# Patient Record
Sex: FEMALE | Race: BLACK OR AFRICAN AMERICAN | NOT HISPANIC OR LATINO | Employment: UNEMPLOYED | URBAN - METROPOLITAN AREA
[De-identification: names, ages, dates, MRNs, and addresses within clinical notes are randomized per-mention and may not be internally consistent; named-entity substitution may affect disease eponyms.]

---

## 2021-07-28 DIAGNOSIS — N95.1 MENOPAUSAL SYNDROME (HOT FLASHES): Primary | ICD-10-CM

## 2021-07-29 ENCOUNTER — TELEPHONE (OUTPATIENT)
Dept: OBSTETRICS AND GYNECOLOGY | Facility: CLINIC | Age: 46
End: 2021-07-29

## 2022-02-11 ENCOUNTER — OFFICE VISIT (OUTPATIENT)
Dept: PRIMARY CARE CLINIC | Facility: CLINIC | Age: 47
End: 2022-02-11
Payer: MEDICARE

## 2022-02-11 VITALS
DIASTOLIC BLOOD PRESSURE: 114 MMHG | SYSTOLIC BLOOD PRESSURE: 179 MMHG | HEIGHT: 67 IN | HEART RATE: 76 BPM | WEIGHT: 293 LBS | BODY MASS INDEX: 45.99 KG/M2 | RESPIRATION RATE: 90 BRPM

## 2022-02-11 DIAGNOSIS — Z12.4 SCREENING FOR CERVICAL CANCER: Primary | ICD-10-CM

## 2022-02-11 DIAGNOSIS — R79.89 LOW VITAMIN B12 LEVEL: ICD-10-CM

## 2022-02-11 DIAGNOSIS — Z12.31 ENCOUNTER FOR SCREENING MAMMOGRAM FOR MALIGNANT NEOPLASM OF BREAST: ICD-10-CM

## 2022-02-11 DIAGNOSIS — Z98.84 H/O BARIATRIC SURGERY: ICD-10-CM

## 2022-02-11 DIAGNOSIS — M13.0 POLYARTHRITIS: ICD-10-CM

## 2022-02-11 DIAGNOSIS — Z12.39 ENCOUNTER FOR SCREENING FOR MALIGNANT NEOPLASM OF BREAST, UNSPECIFIED SCREENING MODALITY: ICD-10-CM

## 2022-02-11 DIAGNOSIS — I10 HYPERTENSION, UNSPECIFIED TYPE: ICD-10-CM

## 2022-02-11 DIAGNOSIS — E66.01 OBESITY, MORBID: ICD-10-CM

## 2022-02-11 PROCEDURE — 3080F PR MOST RECENT DIASTOLIC BLOOD PRESSURE >= 90 MM HG: ICD-10-PCS | Mod: CPTII,S$GLB,, | Performed by: INTERNAL MEDICINE

## 2022-02-11 PROCEDURE — 99204 OFFICE O/P NEW MOD 45 MIN: CPT | Mod: S$GLB,,, | Performed by: INTERNAL MEDICINE

## 2022-02-11 PROCEDURE — 4010F ACE/ARB THERAPY RXD/TAKEN: CPT | Mod: CPTII,S$GLB,, | Performed by: INTERNAL MEDICINE

## 2022-02-11 PROCEDURE — 1159F MED LIST DOCD IN RCRD: CPT | Mod: CPTII,S$GLB,, | Performed by: INTERNAL MEDICINE

## 2022-02-11 PROCEDURE — 1159F PR MEDICATION LIST DOCUMENTED IN MEDICAL RECORD: ICD-10-PCS | Mod: CPTII,S$GLB,, | Performed by: INTERNAL MEDICINE

## 2022-02-11 PROCEDURE — 3077F SYST BP >= 140 MM HG: CPT | Mod: CPTII,S$GLB,, | Performed by: INTERNAL MEDICINE

## 2022-02-11 PROCEDURE — 3008F BODY MASS INDEX DOCD: CPT | Mod: CPTII,S$GLB,, | Performed by: INTERNAL MEDICINE

## 2022-02-11 PROCEDURE — 3080F DIAST BP >= 90 MM HG: CPT | Mod: CPTII,S$GLB,, | Performed by: INTERNAL MEDICINE

## 2022-02-11 PROCEDURE — 3077F PR MOST RECENT SYSTOLIC BLOOD PRESSURE >= 140 MM HG: ICD-10-PCS | Mod: CPTII,S$GLB,, | Performed by: INTERNAL MEDICINE

## 2022-02-11 PROCEDURE — 3008F PR BODY MASS INDEX (BMI) DOCUMENTED: ICD-10-PCS | Mod: CPTII,S$GLB,, | Performed by: INTERNAL MEDICINE

## 2022-02-11 PROCEDURE — 99204 PR OFFICE/OUTPT VISIT, NEW, LEVL IV, 45-59 MIN: ICD-10-PCS | Mod: S$GLB,,, | Performed by: INTERNAL MEDICINE

## 2022-02-11 PROCEDURE — 4010F PR ACE/ARB THEARPY RXD/TAKEN: ICD-10-PCS | Mod: CPTII,S$GLB,, | Performed by: INTERNAL MEDICINE

## 2022-02-11 RX ORDER — BUPROPION HYDROCHLORIDE 150 MG/1
TABLET, FILM COATED, EXTENDED RELEASE ORAL
COMMUNITY
Start: 2021-12-06 | End: 2022-07-21 | Stop reason: SDUPTHER

## 2022-02-11 RX ORDER — BUDESONIDE AND FORMOTEROL FUMARATE DIHYDRATE 160; 4.5 UG/1; UG/1
AEROSOL RESPIRATORY (INHALATION)
COMMUNITY
Start: 2021-03-31 | End: 2022-03-08

## 2022-02-11 RX ORDER — ONDANSETRON 4 MG/1
TABLET, ORALLY DISINTEGRATING ORAL
COMMUNITY
Start: 2022-01-06 | End: 2022-03-08

## 2022-02-11 RX ORDER — METOPROLOL SUCCINATE 50 MG/1
50 TABLET, EXTENDED RELEASE ORAL
COMMUNITY
Start: 2021-02-25 | End: 2023-07-13 | Stop reason: SDUPTHER

## 2022-02-11 RX ORDER — GABAPENTIN 300 MG/1
300 CAPSULE ORAL 2 TIMES DAILY
COMMUNITY
Start: 2022-01-10 | End: 2022-07-21

## 2022-02-11 RX ORDER — SACUBITRIL AND VALSARTAN 24; 26 MG/1; MG/1
1 TABLET, FILM COATED ORAL 2 TIMES DAILY
COMMUNITY
Start: 2022-01-10 | End: 2022-06-07

## 2022-02-11 RX ORDER — CYANOCOBALAMIN 1000 UG/ML
1000 INJECTION, SOLUTION INTRAMUSCULAR; SUBCUTANEOUS
COMMUNITY
Start: 2021-06-30 | End: 2023-07-13

## 2022-02-11 RX ORDER — BUDESONIDE AND FORMOTEROL FUMARATE DIHYDRATE 80; 4.5 UG/1; UG/1
2 AEROSOL RESPIRATORY (INHALATION)
COMMUNITY

## 2022-02-11 RX ORDER — MUPIROCIN 20 MG/G
OINTMENT TOPICAL 3 TIMES DAILY
COMMUNITY
Start: 2022-01-10

## 2022-02-11 RX ORDER — CALCIUM CARBONATE 500(1250)
1 TABLET,CHEWABLE ORAL DAILY
COMMUNITY
End: 2023-11-15

## 2022-02-11 RX ORDER — MECLIZINE HYDROCHLORIDE 25 MG/1
TABLET ORAL
COMMUNITY
Start: 2022-01-10 | End: 2022-03-08

## 2022-02-11 RX ORDER — SYRINGE AND NEEDLE,INSULIN,1ML 29 G X1/2"
SYRINGE, EMPTY DISPOSABLE MISCELLANEOUS
COMMUNITY
Start: 2021-10-16

## 2022-02-11 RX ORDER — PANTOPRAZOLE SODIUM 40 MG/1
1 TABLET, DELAYED RELEASE ORAL DAILY
COMMUNITY
Start: 2021-12-22 | End: 2022-03-08 | Stop reason: SDUPTHER

## 2022-02-11 RX ORDER — PREDNISONE 5 MG/1
TABLET ORAL
COMMUNITY
Start: 2021-12-15 | End: 2022-06-07

## 2022-02-11 RX ORDER — TOFACITINIB 11 MG/1
TABLET, FILM COATED, EXTENDED RELEASE ORAL
COMMUNITY
Start: 2021-11-29 | End: 2022-03-03 | Stop reason: SDUPTHER

## 2022-02-11 NOTE — PROGRESS NOTES
Subjective:      Patient ID: Lilly Loera is a 46 y.o. female.    Chief Complaint: Establish Care (Pt had 2 falls that broke her arm), Referral (Pt need referral for a dietician and also a referral to a mental health facility. ), and Dizziness (Pt stated the dizziness until pt had covid)    HPI       Was seeing Dr Washington and states she has had a bunch of lab work but no appointment. She has been on methotrexate in the past but it made her sick    Patient states she had a pneumonia and influenza vaccine in 2018 and she had a severe reaction. She states she was waiting on Dr Washington to let her know if she is safe to get the covid vaccine. She states she has had it twice now      Her podatrist is Dr Cryer. 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 A brace has been ordered. She also has polyneuropathy which was discovered by a neurologist      Dr Bae is her Cardiologist    Neurologist is Dr Bai          Past Medical History:   Diagnosis Date    Anemia     Anxiety     Arthritis     CHF (congestive heart failure)     Depression     Hidradenitis suppurativa     Hypertension     Polyneuropathy        Social History     Socioeconomic History    Marital status: Single   Tobacco Use    Smoking status: Current Every Day Smoker    Smokeless tobacco: Never Used   Substance and Sexual Activity    Alcohol use: Yes    Drug use: Not Currently    Sexual activity: Not Currently     Family History   Problem Relation Age of Onset    Hypertension Mother     Arthritis Mother     Hypertension Father     Arthritis Father     Alcohol abuse Father     Heart disease Father        Past Surgical History:   Procedure Laterality Date    club foot correction      gall blader removed      GASTRIC BYPASS      gastric sleeve      HERNIA REPAIR      HYSTERECTOMY      partial    meniscus repair surgery      repaired ac1      under arm graphs         Review of Systems   Constitutional: Positive for  "weight loss. Negative for chills and fever.        Post bariatric surgery   Respiratory: Negative for cough, shortness of breath and wheezing.    Cardiovascular: Negative for chest pain, palpitations and leg swelling.   Gastrointestinal: Negative for abdominal pain, constipation, diarrhea, nausea and vomiting.   Genitourinary: Negative for dysuria, frequency and urgency.   Musculoskeletal: Positive for back pain, falls and myalgias.   Neurological: Positive for dizziness.     Objective:     Physical Exam  Vitals reviewed.   Constitutional:       Appearance: Normal appearance. She is obese.   HENT:      Head: Normocephalic.   Eyes:      Extraocular Movements: Extraocular movements intact.      Conjunctiva/sclera: Conjunctivae normal.      Pupils: Pupils are equal, round, and reactive to light.   Cardiovascular:      Rate and Rhythm: Normal rate and regular rhythm.   Pulmonary:      Effort: Pulmonary effort is normal.      Breath sounds: Normal breath sounds.   Abdominal:      General: Bowel sounds are normal.   Musculoskeletal:      Right lower leg: No edema.      Left lower leg: No edema.   Skin:     General: Skin is warm.      Capillary Refill: Capillary refill takes less than 2 seconds.   Neurological:      General: No focal deficit present.      Mental Status: She is alert and oriented to person, place, and time.   Psychiatric:         Mood and Affect: Mood normal.        BP (!) 179/114 (BP Location: Right arm, Patient Position: Sitting, BP Method: Large (Automatic))   Pulse 76   Resp (!) 90   Ht 5' 7" (1.702 m)   Wt (!) 151.1 kg (333 lb 3.2 oz)   BMI 52.19 kg/m²     Assessment:       ICD-10-CM ICD-9-CM   1. Polyarthritis  M13.0 716.50   2. Obesity, morbid  E66.01 278.01   3. Encounter for screening for malignant neoplasm of breast, unspecified screening modality  Z12.39 V76.10   4. Encounter for screening mammogram for malignant neoplasm of breast   Z12.31 V76.12   5. Hypertension, unspecified type  I10 " 401.9   6. H/O bariatric surgery  Z98.84 V45.86   7. Low vitamin B12 level  E53.8 266.2       Plan:          Polyarthritis  -     Ambulatory referral/consult to Rheumatology; Future; Expected date: 02/18/2022  -     CBC Auto Differential; Future; Expected date: 02/11/2022  -     Comprehensive Metabolic Panel; Future; Expected date: 02/11/2022  -     TSH; Future; Expected date: 02/11/2022  -     Lipid Panel; Future; Expected date: 02/11/2022  -     Vitamin B12; Future; Expected date: 02/11/2022    Obesity, morbid    Encounter for screening for malignant neoplasm of breast, unspecified screening modality  -     Mammo Digital Screening Bilat; Future; Expected date: 02/11/2022    Encounter for screening mammogram for malignant neoplasm of breast   -     Mammo Digital Screening Bilat; Future; Expected date: 02/11/2022    Hypertension, unspecified type  -     CBC Auto Differential; Future; Expected date: 02/11/2022  -     Comprehensive Metabolic Panel; Future; Expected date: 02/11/2022  -     TSH; Future; Expected date: 02/11/2022  -     Lipid Panel; Future; Expected date: 02/11/2022  -     Vitamin B12; Future; Expected date: 02/11/2022    H/O bariatric surgery  -     CBC Auto Differential; Future; Expected date: 02/11/2022  -     Comprehensive Metabolic Panel; Future; Expected date: 02/11/2022  -     TSH; Future; Expected date: 02/11/2022  -     Lipid Panel; Future; Expected date: 02/11/2022  -     Vitamin B12; Future; Expected date: 02/11/2022    Low vitamin B12 level  -     Vitamin B12; Future; Expected date: 02/11/2022         40-minute visit. 5 minutes spent counseling patient on diet, exercise, and weight loss.

## 2022-02-12 LAB
ALBUMIN SERPL-MCNC: 4 G/DL (ref 3.6–5.1)
ALBUMIN/GLOB SERPL: 1.2 (CALC) (ref 1–2.5)
ALP SERPL-CCNC: 73 U/L (ref 31–125)
ALT SERPL-CCNC: 12 U/L (ref 6–29)
AST SERPL-CCNC: 19 U/L (ref 10–30)
BASOPHILS # BLD AUTO: 21 CELLS/UL (ref 0–200)
BASOPHILS NFR BLD AUTO: 0.3 %
BILIRUB SERPL-MCNC: 1 MG/DL (ref 0.2–1.2)
BUN SERPL-MCNC: 19 MG/DL (ref 7–25)
BUN/CREAT SERPL: NORMAL (CALC) (ref 6–22)
CALCIUM SERPL-MCNC: 9.5 MG/DL (ref 8.6–10.2)
CHLORIDE SERPL-SCNC: 105 MMOL/L (ref 98–110)
CHOLEST SERPL-MCNC: 155 MG/DL
CHOLEST/HDLC SERPL: 3 (CALC)
CO2 SERPL-SCNC: 29 MMOL/L (ref 20–32)
CREAT SERPL-MCNC: 0.91 MG/DL (ref 0.5–1.1)
EOSINOPHIL # BLD AUTO: 117 CELLS/UL (ref 15–500)
EOSINOPHIL NFR BLD AUTO: 1.7 %
ERYTHROCYTE [DISTWIDTH] IN BLOOD BY AUTOMATED COUNT: 12.1 % (ref 11–15)
GLOBULIN SER CALC-MCNC: 3.3 G/DL (CALC) (ref 1.9–3.7)
GLUCOSE SERPL-MCNC: 95 MG/DL (ref 65–99)
HCT VFR BLD AUTO: 39.9 % (ref 35–45)
HDLC SERPL-MCNC: 52 MG/DL
HGB BLD-MCNC: 12.9 G/DL (ref 11.7–15.5)
LDLC SERPL CALC-MCNC: 88 MG/DL (CALC)
LYMPHOCYTES # BLD AUTO: 1925 CELLS/UL (ref 850–3900)
LYMPHOCYTES NFR BLD AUTO: 27.9 %
MCH RBC QN AUTO: 31.1 PG (ref 27–33)
MCHC RBC AUTO-ENTMCNC: 32.3 G/DL (ref 32–36)
MCV RBC AUTO: 96.1 FL (ref 80–100)
MONOCYTES # BLD AUTO: 449 CELLS/UL (ref 200–950)
MONOCYTES NFR BLD AUTO: 6.5 %
NEUTROPHILS # BLD AUTO: 4388 CELLS/UL (ref 1500–7800)
NEUTROPHILS NFR BLD AUTO: 63.6 %
NONHDLC SERPL-MCNC: 103 MG/DL (CALC)
PLATELET # BLD AUTO: 298 THOUSAND/UL (ref 140–400)
PMV BLD REES-ECKER: 10.5 FL (ref 7.5–12.5)
POTASSIUM SERPL-SCNC: 4.1 MMOL/L (ref 3.5–5.3)
PROT SERPL-MCNC: 7.3 G/DL (ref 6.1–8.1)
RBC # BLD AUTO: 4.15 MILLION/UL (ref 3.8–5.1)
SODIUM SERPL-SCNC: 140 MMOL/L (ref 135–146)
TRIGL SERPL-MCNC: 68 MG/DL
TSH SERPL-ACNC: 1.27 MIU/L
VIT B12 SERPL-MCNC: >2000 PG/ML (ref 200–1100)
WBC # BLD AUTO: 6.9 THOUSAND/UL (ref 3.8–10.8)

## 2022-02-14 ENCOUNTER — TELEPHONE (OUTPATIENT)
Dept: PRIMARY CARE CLINIC | Facility: CLINIC | Age: 47
End: 2022-02-14
Payer: MEDICARE

## 2022-02-14 ENCOUNTER — PATIENT MESSAGE (OUTPATIENT)
Dept: PRIMARY CARE CLINIC | Facility: CLINIC | Age: 47
End: 2022-02-14
Payer: MEDICARE

## 2022-02-14 DIAGNOSIS — Z98.84 H/O BARIATRIC SURGERY: ICD-10-CM

## 2022-02-14 DIAGNOSIS — E66.01 OBESITY, MORBID: Primary | ICD-10-CM

## 2022-02-14 DIAGNOSIS — F32.A DEPRESSION, UNSPECIFIED DEPRESSION TYPE: ICD-10-CM

## 2022-02-14 NOTE — TELEPHONE ENCOUNTER
Called the patient to let her know I received the message and would forward to Dr Puckett.     ----- Message from Sharon Marie sent at 2/14/2022  8:22 AM CST -----  Contact: Patient  Patient called to consult with nurse or staff regarding her referrals for dermatology, psychiatry and nutritionist. She would like to see if those referrals can be put in for her and would like a call back. Patient can be reached at [phone]. Thanks/MR

## 2022-02-17 ENCOUNTER — TELEPHONE (OUTPATIENT)
Dept: PRIMARY CARE CLINIC | Facility: CLINIC | Age: 47
End: 2022-02-17
Payer: MEDICARE

## 2022-02-24 DIAGNOSIS — L73.2 HIDRADENITIS SUPPURATIVA: Primary | ICD-10-CM

## 2022-02-25 ENCOUNTER — PATIENT MESSAGE (OUTPATIENT)
Dept: PRIMARY CARE CLINIC | Facility: CLINIC | Age: 47
End: 2022-02-25
Payer: MEDICARE

## 2022-02-25 RX ORDER — TOFACITINIB 11 MG/1
TABLET, FILM COATED, EXTENDED RELEASE ORAL
Status: CANCELLED | OUTPATIENT
Start: 2022-02-25

## 2022-02-25 NOTE — TELEPHONE ENCOUNTER
Message was sent thru portal              ----- Message from Dillon De La Paz sent at 2/25/2022 11:24 AM CST -----  Contact: pt  Type:  RX Refill Request    Who Called:  pt   Refill or New Rx:refill   RX Name and Strength:xelgenz xr 11mg   How is the patient currently taking it? (ex. 1XDay): once daily   Is this a 30 day or 90 day RX: 30 days   Preferred Pharmacy with phone number: [facility]  [phone]  Local or Mail Order: local   Ordering Provider: maximo   Would the patient rather a call back or a response via [facility]? Pt portal or phone   Best Call Back Number: [phone]  Additional Information:

## 2022-02-27 ENCOUNTER — PATIENT MESSAGE (OUTPATIENT)
Dept: PRIMARY CARE CLINIC | Facility: CLINIC | Age: 47
End: 2022-02-27
Payer: MEDICARE

## 2022-02-28 RX ORDER — TOFACITINIB 11 MG/1
TABLET, FILM COATED, EXTENDED RELEASE ORAL
Status: CANCELLED | OUTPATIENT
Start: 2022-02-28

## 2022-03-02 DIAGNOSIS — M13.0 POLYARTHRITIS: Primary | ICD-10-CM

## 2022-03-02 RX ORDER — TOFACITINIB 11 MG/1
TABLET, FILM COATED, EXTENDED RELEASE ORAL
Status: CANCELLED | OUTPATIENT
Start: 2022-03-02

## 2022-03-03 ENCOUNTER — PATIENT MESSAGE (OUTPATIENT)
Dept: PRIMARY CARE CLINIC | Facility: CLINIC | Age: 47
End: 2022-03-03
Payer: MEDICARE

## 2022-03-03 RX ORDER — TOFACITINIB 11 MG/1
1 TABLET, FILM COATED, EXTENDED RELEASE ORAL DAILY
Qty: 30 TABLET | Refills: 3 | Status: SHIPPED | OUTPATIENT
Start: 2022-03-03 | End: 2022-06-28 | Stop reason: SDUPTHER

## 2022-03-03 RX ORDER — TOFACITINIB 11 MG/1
TABLET, FILM COATED, EXTENDED RELEASE ORAL
OUTPATIENT
Start: 2022-03-03

## 2022-03-03 NOTE — TELEPHONE ENCOUNTER
mai urban [birth year] is requesting xeljanz, this is something her rheumatologist prescribes, she sees Dr Washington

## 2022-03-08 ENCOUNTER — OFFICE VISIT (OUTPATIENT)
Dept: OBSTETRICS AND GYNECOLOGY | Facility: CLINIC | Age: 47
End: 2022-03-08
Payer: MEDICARE

## 2022-03-08 VITALS
BODY MASS INDEX: 45.99 KG/M2 | SYSTOLIC BLOOD PRESSURE: 167 MMHG | WEIGHT: 293 LBS | OXYGEN SATURATION: 98 % | HEART RATE: 81 BPM | DIASTOLIC BLOOD PRESSURE: 103 MMHG | HEIGHT: 67 IN

## 2022-03-08 DIAGNOSIS — G51.0 BELL'S PALSY: ICD-10-CM

## 2022-03-08 DIAGNOSIS — F41.9 ANXIETY: ICD-10-CM

## 2022-03-08 DIAGNOSIS — N95.1 MENOPAUSAL SYNDROME (HOT FLASHES): ICD-10-CM

## 2022-03-08 DIAGNOSIS — F17.200 SMOKER: ICD-10-CM

## 2022-03-08 DIAGNOSIS — Z12.4 SCREENING FOR CERVICAL CANCER: ICD-10-CM

## 2022-03-08 DIAGNOSIS — Z01.419 ENCOUNTER FOR ANNUAL ROUTINE GYNECOLOGICAL EXAMINATION: Primary | ICD-10-CM

## 2022-03-08 PROCEDURE — 3077F PR MOST RECENT SYSTOLIC BLOOD PRESSURE >= 140 MM HG: ICD-10-PCS | Mod: CPTII,S$GLB,, | Performed by: NURSE PRACTITIONER

## 2022-03-08 PROCEDURE — 1160F RVW MEDS BY RX/DR IN RCRD: CPT | Mod: CPTII,S$GLB,, | Performed by: NURSE PRACTITIONER

## 2022-03-08 PROCEDURE — 1160F PR REVIEW ALL MEDS BY PRESCRIBER/CLIN PHARMACIST DOCUMENTED: ICD-10-PCS | Mod: CPTII,S$GLB,, | Performed by: NURSE PRACTITIONER

## 2022-03-08 PROCEDURE — 3077F SYST BP >= 140 MM HG: CPT | Mod: CPTII,S$GLB,, | Performed by: NURSE PRACTITIONER

## 2022-03-08 PROCEDURE — G0101 CA SCREEN;PELVIC/BREAST EXAM: HCPCS | Mod: S$GLB,,, | Performed by: NURSE PRACTITIONER

## 2022-03-08 PROCEDURE — 3008F PR BODY MASS INDEX (BMI) DOCUMENTED: ICD-10-PCS | Mod: CPTII,S$GLB,, | Performed by: NURSE PRACTITIONER

## 2022-03-08 PROCEDURE — G0101 PR CA SCREEN;PELVIC/BREAST EXAM: ICD-10-PCS | Mod: S$GLB,,, | Performed by: NURSE PRACTITIONER

## 2022-03-08 PROCEDURE — 1159F MED LIST DOCD IN RCRD: CPT | Mod: CPTII,S$GLB,, | Performed by: NURSE PRACTITIONER

## 2022-03-08 PROCEDURE — 3080F PR MOST RECENT DIASTOLIC BLOOD PRESSURE >= 90 MM HG: ICD-10-PCS | Mod: CPTII,S$GLB,, | Performed by: NURSE PRACTITIONER

## 2022-03-08 PROCEDURE — 3008F BODY MASS INDEX DOCD: CPT | Mod: CPTII,S$GLB,, | Performed by: NURSE PRACTITIONER

## 2022-03-08 PROCEDURE — 4010F PR ACE/ARB THEARPY RXD/TAKEN: ICD-10-PCS | Mod: CPTII,S$GLB,, | Performed by: NURSE PRACTITIONER

## 2022-03-08 PROCEDURE — 1159F PR MEDICATION LIST DOCUMENTED IN MEDICAL RECORD: ICD-10-PCS | Mod: CPTII,S$GLB,, | Performed by: NURSE PRACTITIONER

## 2022-03-08 PROCEDURE — 3080F DIAST BP >= 90 MM HG: CPT | Mod: CPTII,S$GLB,, | Performed by: NURSE PRACTITIONER

## 2022-03-08 PROCEDURE — 4010F ACE/ARB THERAPY RXD/TAKEN: CPT | Mod: CPTII,S$GLB,, | Performed by: NURSE PRACTITIONER

## 2022-03-08 RX ORDER — ESTRADIOL 0.05 MG/D
1 FILM, EXTENDED RELEASE TRANSDERMAL
Qty: 12 PATCH | Refills: 3 | Status: SHIPPED | OUTPATIENT
Start: 2022-03-10 | End: 2022-03-08

## 2022-03-08 RX ORDER — PANTOPRAZOLE SODIUM 40 MG/1
40 TABLET, DELAYED RELEASE ORAL DAILY
Qty: 90 TABLET | Refills: 3 | Status: SHIPPED | OUTPATIENT
Start: 2022-03-08 | End: 2023-07-13

## 2022-03-08 RX ORDER — BUSPIRONE HYDROCHLORIDE 15 MG/1
TABLET ORAL
Qty: 30 TABLET | Refills: 11 | Status: SHIPPED | OUTPATIENT
Start: 2022-03-08 | End: 2022-07-21 | Stop reason: SDUPTHER

## 2022-03-08 NOTE — PROGRESS NOTES
"  Subjective:       Patient ID: Lilly Loera is a 46 y.o. female.    Chief Complaint:  Well Woman (Patient is here for her Annual with Pap)      History of Present Illness  Pt here for gyn annual.  History and past labs reviewed with patient.    Complaints: Hot flashes, WWE, anxiety   With the start of the Zyban anxiety has increased and pt is feeling "aura of doom"    Review of Systems  Review of Systems   Constitutional: Negative for appetite change and fatigue.   HENT: Negative for tinnitus.    Cardiovascular: Negative for chest pain and palpitations.   Gastrointestinal: Negative for abdominal pain, bloating, constipation, vomiting and reflux.   Endocrine: Negative for diabetes, hyperthyroidism and hypothyroidism.   Genitourinary: Positive for vaginal dryness. Negative for flank pain, pelvic pain and postcoital bleeding.   Musculoskeletal: Negative for back pain and myalgias.   Integumentary:  Negative for rash, mole/lesion, breast mass and breast tenderness.   Neurological: Negative for vertigo, syncope and headaches.   Psychiatric/Behavioral: Negative for depression and sleep disturbance. The patient is not nervous/anxious.    Breast: Negative for lump, mass, mastodynia and tenderness     c/o menopause      Objective:     Vitals:    03/08/22 1400   BP: (!) 167/103   BP Location: Left arm   Patient Position: Sitting   Pulse: 81   SpO2: 98%   Weight: (!) 150.5 kg (331 lb 11.2 oz)   Height: 5' 7" (1.702 m)        Physical Exam:   Constitutional: She is oriented to person, place, and time. She appears well-developed and well-nourished. No distress.    HENT:   Head: Normocephalic and atraumatic.    Eyes: Pupils are equal, round, and reactive to light. Conjunctivae and EOM are normal.    Neck: No tracheal deviation present. No thyromegaly present.    Cardiovascular: Normal rate, regular rhythm and normal heart sounds.  Exam reveals no clubbing, no cyanosis and no edema.     Pulmonary/Chest: Effort normal and " breath sounds normal. No respiratory distress.        Abdominal: Soft. She exhibits no distension and no mass. There is no abdominal tenderness. There is no rebound and no guarding. No hernia. Hernia confirmed negative in the right inguinal area and confirmed negative in the left inguinal area.     Genitourinary:    Vagina and rectum normal.      Pelvic exam was performed with patient supine.   There is no rash, tenderness, lesion or injury on the right labia. There is no rash, tenderness, lesion or injury on the left labia. Cervix is normal. Right adnexum displays no mass, no tenderness and no fullness. Left adnexum displays no mass, no tenderness and no fullness. Vaginal cuff normal.  No  no vaginal discharge in the vagina. Cervix exhibits no discharge. Uterus is absent.           Musculoskeletal: Normal range of motion and moves all extremeties.       Neurological: She is alert and oriented to person, place, and time.    Skin: Skin is warm and dry. No cyanosis. Nails show no clubbing.    Psychiatric: She has a normal mood and affect. Her behavior is normal. Judgment and thought content normal.      breast exam wnl- no nipple dc, skin changes, masses or lymph nodes palpated bilaterally    Assessment:     1. Encounter for annual routine gynecological examination    2. Screening for cervical cancer    3. Menopausal syndrome (hot flashes)    4. Bell's palsy    5. Smoker    6. Anxiety              Plan:       Encounter for annual routine gynecological examination    Screening for cervical cancer  -     Ambulatory referral/consult to Gynecology    Menopausal syndrome (hot flashes)  -     estradiol 0.05 mg/24 hr td ptsw (VIVELLE-DOT) 0.05 mg/24 hr; Place 1 patch onto the skin twice a week.  Dispense: 12 patch; Refill: 3    Bell's palsy    Smoker  -     Ambulatory referral/consult to Smoking Cessation Program; Future; Expected date: 03/15/2022    Anxiety  -     busPIRone (BUSPAR) 15 MG tablet; Pt is to take 1/3 (5 mg) or  1/2 (7.5mgs) or one po HS PRN  Dispense: 30 tablet; Refill: 11    Other orders  -     pantoprazole (PROTONIX) 40 MG tablet; Take 1 tablet (40 mg total) by mouth once daily.  Dispense: 90 tablet; Refill: 3     instructed to continue with the smoking cessation   MMG ordered  Follow up in about 1 year (around 3/8/2023).

## 2022-03-18 ENCOUNTER — PATIENT MESSAGE (OUTPATIENT)
Dept: PRIMARY CARE CLINIC | Facility: CLINIC | Age: 47
End: 2022-03-18
Payer: MEDICARE

## 2022-03-24 ENCOUNTER — OFFICE VISIT (OUTPATIENT)
Dept: FAMILY MEDICINE | Facility: CLINIC | Age: 47
End: 2022-03-24
Payer: MEDICARE

## 2022-03-24 VITALS
SYSTOLIC BLOOD PRESSURE: 155 MMHG | TEMPERATURE: 97 F | HEIGHT: 67 IN | RESPIRATION RATE: 16 BRPM | DIASTOLIC BLOOD PRESSURE: 105 MMHG | BODY MASS INDEX: 45.99 KG/M2 | HEART RATE: 77 BPM | OXYGEN SATURATION: 99 % | WEIGHT: 293 LBS

## 2022-03-24 DIAGNOSIS — R03.0 ELEVATED BLOOD PRESSURE READING: ICD-10-CM

## 2022-03-24 DIAGNOSIS — F41.9 ANXIETY AND DEPRESSION: ICD-10-CM

## 2022-03-24 DIAGNOSIS — F32.A ANXIETY AND DEPRESSION: ICD-10-CM

## 2022-03-24 DIAGNOSIS — I50.9 CONGESTIVE HEART FAILURE, UNSPECIFIED HF CHRONICITY, UNSPECIFIED HEART FAILURE TYPE: ICD-10-CM

## 2022-03-24 DIAGNOSIS — L73.2 HIDRADENITIS SUPPURATIVA OF LEFT AXILLA: ICD-10-CM

## 2022-03-24 DIAGNOSIS — Z76.89 ENCOUNTER TO ESTABLISH CARE: Primary | ICD-10-CM

## 2022-03-24 DIAGNOSIS — Z00.00 ROUTINE ADULT HEALTH MAINTENANCE: ICD-10-CM

## 2022-03-24 PROBLEM — Z98.84 S/P GASTRIC BYPASS: Status: ACTIVE | Noted: 2021-02-26

## 2022-03-24 PROBLEM — K44.9 HIATAL HERNIA WITH GERD AND ESOPHAGITIS: Status: ACTIVE | Noted: 2022-03-24

## 2022-03-24 PROBLEM — K21.00 HIATAL HERNIA WITH GERD AND ESOPHAGITIS: Status: ACTIVE | Noted: 2022-03-24

## 2022-03-24 PROBLEM — I10 ESSENTIAL HYPERTENSION: Status: ACTIVE | Noted: 2022-03-24

## 2022-03-24 PROBLEM — M06.9 RA (RHEUMATOID ARTHRITIS): Status: ACTIVE | Noted: 2022-03-24

## 2022-03-24 PROBLEM — M19.90 OSTEOARTHRITIS: Status: ACTIVE | Noted: 2022-03-24

## 2022-03-24 PROCEDURE — 3077F PR MOST RECENT SYSTOLIC BLOOD PRESSURE >= 140 MM HG: ICD-10-PCS | Mod: CPTII,S$GLB,, | Performed by: OBSTETRICS & GYNECOLOGY

## 2022-03-24 PROCEDURE — 4010F ACE/ARB THERAPY RXD/TAKEN: CPT | Mod: CPTII,S$GLB,, | Performed by: OBSTETRICS & GYNECOLOGY

## 2022-03-24 PROCEDURE — 99214 PR OFFICE/OUTPT VISIT, EST, LEVL IV, 30-39 MIN: ICD-10-PCS | Mod: S$GLB,,, | Performed by: OBSTETRICS & GYNECOLOGY

## 2022-03-24 PROCEDURE — 99214 OFFICE O/P EST MOD 30 MIN: CPT | Mod: S$GLB,,, | Performed by: OBSTETRICS & GYNECOLOGY

## 2022-03-24 PROCEDURE — 1160F PR REVIEW ALL MEDS BY PRESCRIBER/CLIN PHARMACIST DOCUMENTED: ICD-10-PCS | Mod: CPTII,S$GLB,, | Performed by: OBSTETRICS & GYNECOLOGY

## 2022-03-24 PROCEDURE — 4010F PR ACE/ARB THEARPY RXD/TAKEN: ICD-10-PCS | Mod: CPTII,S$GLB,, | Performed by: OBSTETRICS & GYNECOLOGY

## 2022-03-24 PROCEDURE — 3080F DIAST BP >= 90 MM HG: CPT | Mod: CPTII,S$GLB,, | Performed by: OBSTETRICS & GYNECOLOGY

## 2022-03-24 PROCEDURE — 1159F MED LIST DOCD IN RCRD: CPT | Mod: CPTII,S$GLB,, | Performed by: OBSTETRICS & GYNECOLOGY

## 2022-03-24 PROCEDURE — 1160F RVW MEDS BY RX/DR IN RCRD: CPT | Mod: CPTII,S$GLB,, | Performed by: OBSTETRICS & GYNECOLOGY

## 2022-03-24 PROCEDURE — 3008F PR BODY MASS INDEX (BMI) DOCUMENTED: ICD-10-PCS | Mod: CPTII,S$GLB,, | Performed by: OBSTETRICS & GYNECOLOGY

## 2022-03-24 PROCEDURE — 1159F PR MEDICATION LIST DOCUMENTED IN MEDICAL RECORD: ICD-10-PCS | Mod: CPTII,S$GLB,, | Performed by: OBSTETRICS & GYNECOLOGY

## 2022-03-24 PROCEDURE — 3008F BODY MASS INDEX DOCD: CPT | Mod: CPTII,S$GLB,, | Performed by: OBSTETRICS & GYNECOLOGY

## 2022-03-24 PROCEDURE — 3080F PR MOST RECENT DIASTOLIC BLOOD PRESSURE >= 90 MM HG: ICD-10-PCS | Mod: CPTII,S$GLB,, | Performed by: OBSTETRICS & GYNECOLOGY

## 2022-03-24 PROCEDURE — 3077F SYST BP >= 140 MM HG: CPT | Mod: CPTII,S$GLB,, | Performed by: OBSTETRICS & GYNECOLOGY

## 2022-03-24 NOTE — PROGRESS NOTES
Subjective:   Patient ID: Lilly Loera is a 46 y.o. female who presents for evaluation today.    Chief Complaint:  Establish Care      History of Present Illness  KIKI Carr is a 46 y.o. y.o.female who presents to clinic today to establish care with me.  She was previously under the care of Dr. Puckett.     GYN - Sees Chelsea Rasmussen NP. Up to date on pap smear. In process of scheduling mammogram.     Seeing smoking cessation, in the process of quitting       Anxiety/Depression  Patient reports symptoms of anxiety and depression.   Onset of symptoms was approximately a few months ago.    Symptoms have been gradually worsening since that time. Improving some since starting Buspar & decreasing Zyban. Denies suicidal attempt, suicidal thoughts, suicidal thoughts with specific plan and suicidal thoughts without plan.   Previous treatment includes medication BuSpar.   She reports the following medication side effects: none.    CHF, hypertension  CHF diagnosed in 2020 after having Covid 19  Sees Dr. Bae for management of conditions  Last office visit <6 months ago  Requesting referral to RD     Polyarthralgia - was previously seeing Dr. Washington, awaiting appointment with Dr. Asencio scheduled for 6/28/2022    Hidradenitis suppurativa - has upcoming appointment with dermatology, currently battling flare in left axilla    OA  Sees Jason Carson MD for management     FAMILY HISTORY  Family History   Problem Relation Age of Onset    Hypertension Mother     Arthritis Mother     Hypertension Father     Arthritis Father     Alcohol abuse Father     Heart disease Father      SOCIAL HISTORY  Social History     Tobacco Use   Smoking Status Current Every Day Smoker   Smokeless Tobacco Never Used   ,   Social History     Substance and Sexual Activity   Alcohol Use Yes     MEDICATIONS  Outpatient Medications Marked as Taking for the 3/24/22 encounter (Office Visit) with Barb Jaramillo NP   Medication Sig Dispense  "Refill    budesonide-formoterol 80-4.5 mcg (SYMBICORT) 80-4.5 mcg/actuation HFAA Inhale 2 puffs into the lungs. Controller      buPROPion HCL, smoking deter, (ZYBAN) 150 mg TBSR 12 hr tablet TAKE 1 TABLET BY MOUTH IN THE MORNING FOR THE FIRST 3 DAYS THEN 1 TABLET BY MOUTH EVERY 12 HOURS      busPIRone (BUSPAR) 15 MG tablet Pt is to take 1/3 (5 mg) or 1/2 (7.5mgs) or one po HS PRN 30 tablet 11    calcium carbonate (OS-MICHELLE) 500 mg calcium (1,250 mg) chewable tablet Take 1 tablet by mouth once daily.      cyanocobalamin 1,000 mcg/mL injection Inject 1,000 mcg as directed.      PAWAN 0.05 mg/24 hr APPLY 1 PATCH TOPICALLY TWICE A WEEK 8 patch 3    ENTRESTO 24-26 mg per tablet Take 1 tablet by mouth 2 (two) times daily.      gabapentin (NEURONTIN) 300 MG capsule Take 300 mg by mouth 2 (two) times daily.      metoprolol succinate (TOPROL-XL) 50 MG 24 hr tablet Take 50 mg by mouth.      mupirocin (BACTROBAN) 2 % ointment 3 (three) times daily. Apply to affected area      pantoprazole (PROTONIX) 40 MG tablet Take 1 tablet (40 mg total) by mouth once daily. 90 tablet 3    SURE COMFORT INSULIN SYRINGE 1 mL 29 gauge x 1/2" Syrg       tofacitinib (XELJANZ XR) 11 mg Tb24 Take 1 tablet by mouth once daily. 30 tablet 3     Review of Systems   Review of Systems   Constitutional: Negative for activity change, appetite change, fever and unexpected weight change.   HENT: Negative for dental problem, hearing loss, sneezing, sore throat, trouble swallowing and voice change.    Eyes: Negative for visual disturbance.   Respiratory: Negative for choking, chest tightness, shortness of breath and stridor.    Cardiovascular: Negative for chest pain and palpitations.   Gastrointestinal: Negative for abdominal pain, anal bleeding, blood in stool, change in bowel habit, nausea, vomiting, fecal incontinence and change in bowel habit.   Endocrine: Negative for polydipsia, polyphagia and polyuria.   Genitourinary: Negative for decreased " "urine volume, difficulty urinating, dysuria, frequency, hematuria and urgency.   Musculoskeletal: Positive for arthralgias. Negative for gait problem, joint swelling, neck pain, neck stiffness and joint deformity.   Integumentary:  Negative for pallor, rash, wound and mole/lesion.   Allergic/Immunologic: Negative for immunocompromised state.   Neurological: Negative for vertigo, seizures, syncope, weakness, light-headedness, disturbances in coordination and coordination difficulties.   Hematological: Negative for adenopathy. Does not bruise/bleed easily.   Psychiatric/Behavioral: Negative for agitation, behavioral problems, confusion, hallucinations, sleep disturbance and suicidal ideas.         Objective:    VITALS  BP Readings from Last 1 Encounters:   03/24/22 (!) 155/105   Weight: (!) 150.1 kg (331 lb)   Height: 5' 7" (170.2 cm)   BMI Readings from Last 1 Encounters:   03/24/22 51.84 kg/m²       Physical Exam  Vitals reviewed.   Constitutional:       General: She is not in acute distress.     Appearance: Normal appearance. She is not ill-appearing, toxic-appearing or diaphoretic.   HENT:      Head: Normocephalic and atraumatic.      Right Ear: External ear normal.      Left Ear: External ear normal.      Nose: Nose normal. No congestion or rhinorrhea.   Eyes:      General:         Right eye: No discharge.         Left eye: No discharge.   Cardiovascular:      Rate and Rhythm: Normal rate and regular rhythm.      Heart sounds: Normal heart sounds. No murmur heard.    No friction rub. No gallop.   Pulmonary:      Effort: Pulmonary effort is normal. No respiratory distress.      Breath sounds: Normal breath sounds. No stridor. No wheezing, rhonchi or rales.   Musculoskeletal:         General: Normal range of motion.      Cervical back: Normal range of motion and neck supple.      Right lower leg: No edema.      Left lower leg: No edema.   Skin:     General: Skin is warm and dry.      Coloration: Skin is not " jaundiced or pale.      Comments: Furuncle noted to left axilla with yellow colored central plug. Scarring noted to underarm.    Neurological:      General: No focal deficit present.      Mental Status: She is alert and oriented to person, place, and time.      Gait: Gait normal.   Psychiatric:         Mood and Affect: Mood normal.         Behavior: Behavior normal.         Thought Content: Thought content normal.         Judgment: Judgment normal.         Assessment:      1. Encounter to establish care    2. Routine adult health maintenance    3. Anxiety and depression    4. Elevated blood pressure reading    5. Hidradenitis suppurativa of left axilla    6. Congestive heart failure, unspecified HF chronicity, unspecified heart failure type       Plan:   Encounter to establish care    Routine adult health maintenance  Age appropriate health promotion and maintenance discussed, including preventive health screenings indicated for patient.     Anxiety and depression  -     Ambulatory referral/consult to Psychiatry; Future; Expected date: 03/31/2022  Referral faxed today. Patient instructed to contact our office if a call has not received from the facility to schedule an appointment within the next 2 weeks.     Elevated blood pressure reading  Monitor BP daily at home. Keep a log of values & report to your cardiovascular provider. Report to nearest ER with chest pain, shortness of breath, markedly increased BP, dyspnea, headache, visual disturbance, etc.      Hidradenitis suppurativa of left axilla  Apply warm compress to area 2-3 daily. Continue use of mupirocin ointment. Report s/s indicating infection.     Congestive heart failure, unspecified HF chronicity, unspecified heart failure type  -     Ambulatory referral/consult to Nutrition Services; Future; Expected date: 03/31/2022  Referral sent to dietician today. Patient instructed to contact our office if a call has not received from the facility to schedule an  appointment within the next 2 weeks.    Patient instructed to contact the clinic should any questions or concerns arise prior to next office visit. Risks, benefits, and alternatives discussed with patient. Patient verbalized understanding of discussed plan of care. Asked patient if any further questions, answered no. Follow up as discussed or sooner PRN.

## 2022-03-25 DIAGNOSIS — F41.9 ANXIETY AND DEPRESSION: Primary | ICD-10-CM

## 2022-03-25 DIAGNOSIS — I50.9 CONGESTIVE HEART FAILURE, UNSPECIFIED HF CHRONICITY, UNSPECIFIED HEART FAILURE TYPE: ICD-10-CM

## 2022-03-25 DIAGNOSIS — F32.A ANXIETY AND DEPRESSION: Primary | ICD-10-CM

## 2022-03-25 DIAGNOSIS — E66.3 OVERWEIGHT: Primary | ICD-10-CM

## 2022-03-28 ENCOUNTER — PATIENT MESSAGE (OUTPATIENT)
Dept: FAMILY MEDICINE | Facility: CLINIC | Age: 47
End: 2022-03-28
Payer: MEDICARE

## 2022-04-14 ENCOUNTER — TELEPHONE (OUTPATIENT)
Dept: PRIMARY CARE CLINIC | Facility: CLINIC | Age: 47
End: 2022-04-14
Payer: MEDICARE

## 2022-05-23 ENCOUNTER — TELEPHONE (OUTPATIENT)
Dept: FAMILY MEDICINE | Facility: CLINIC | Age: 47
End: 2022-05-23
Payer: MEDICARE

## 2022-05-23 ENCOUNTER — PATIENT MESSAGE (OUTPATIENT)
Dept: FAMILY MEDICINE | Facility: CLINIC | Age: 47
End: 2022-05-23
Payer: MEDICARE

## 2022-05-23 NOTE — TELEPHONE ENCOUNTER
----- Message from Barb Jaramillo NP sent at 5/23/2022  9:11 AM CDT -----  Regarding: please call   Please ask patient if she is checking her blood pressure at home.  If she is what have her readings been.

## 2022-05-24 ENCOUNTER — TELEPHONE (OUTPATIENT)
Dept: FAMILY MEDICINE | Facility: CLINIC | Age: 47
End: 2022-05-24
Payer: MEDICARE

## 2022-05-24 NOTE — TELEPHONE ENCOUNTER
Informed patient that she should get plenty of rest and drink plenty of fluids. She states that she has no ride to come to the office and I informed her that I can schedule her an appointment when she feels better and has a ride. Patient verbalized understanding.

## 2022-05-24 NOTE — TELEPHONE ENCOUNTER
----- Message from Renate Donahue sent at 5/24/2022  2:07 PM CDT -----  Pt would like to speak with nurse, states she has been sick and thought it was covid but tests came back negative. Please call at [phone]

## 2022-05-25 ENCOUNTER — TELEPHONE (OUTPATIENT)
Dept: OBSTETRICS AND GYNECOLOGY | Facility: CLINIC | Age: 47
End: 2022-05-25
Payer: MEDICARE

## 2022-05-25 DIAGNOSIS — N95.1 MENOPAUSAL SYNDROME (HOT FLASHES): ICD-10-CM

## 2022-05-25 RX ORDER — ESTRADIOL 0.05 MG/D
FILM, EXTENDED RELEASE TRANSDERMAL
Qty: 8 PATCH | Refills: 11 | Status: SHIPPED | OUTPATIENT
Start: 2022-05-25 | End: 2022-07-18 | Stop reason: SDUPTHER

## 2022-05-25 NOTE — TELEPHONE ENCOUNTER
----- Message from Yaz Gilman MA sent at 5/24/2022  5:08 PM CDT -----  Contact: Patient    ----- Message -----  From: Jose Corado  Sent: 5/24/2022   2:02 PM CDT  To: Valery Tran Staff    Patient need the nurse to call her regarding the below RX          PAWAN 0.05 mg/24 hr      Please call the patient back at  [phone]

## 2022-05-31 ENCOUNTER — PATIENT MESSAGE (OUTPATIENT)
Dept: FAMILY MEDICINE | Facility: CLINIC | Age: 47
End: 2022-05-31
Payer: MEDICARE

## 2022-06-07 ENCOUNTER — OFFICE VISIT (OUTPATIENT)
Dept: FAMILY MEDICINE | Facility: CLINIC | Age: 47
End: 2022-06-07
Payer: MEDICARE

## 2022-06-07 VITALS
WEIGHT: 293 LBS | HEIGHT: 67 IN | HEART RATE: 69 BPM | OXYGEN SATURATION: 98 % | DIASTOLIC BLOOD PRESSURE: 97 MMHG | SYSTOLIC BLOOD PRESSURE: 142 MMHG | RESPIRATION RATE: 16 BRPM | BODY MASS INDEX: 45.99 KG/M2 | TEMPERATURE: 97 F

## 2022-06-07 DIAGNOSIS — K92.1 BLOODY STOOL: ICD-10-CM

## 2022-06-07 DIAGNOSIS — Q66.90 CONGENITAL FOOT DEFORMITY: Primary | ICD-10-CM

## 2022-06-07 DIAGNOSIS — R03.0 ELEVATED BLOOD PRESSURE READING: ICD-10-CM

## 2022-06-07 PROCEDURE — 1159F MED LIST DOCD IN RCRD: CPT | Mod: CPTII,S$GLB,, | Performed by: OBSTETRICS & GYNECOLOGY

## 2022-06-07 PROCEDURE — 99213 PR OFFICE/OUTPT VISIT, EST, LEVL III, 20-29 MIN: ICD-10-PCS | Mod: S$GLB,,, | Performed by: OBSTETRICS & GYNECOLOGY

## 2022-06-07 PROCEDURE — 3008F BODY MASS INDEX DOCD: CPT | Mod: CPTII,S$GLB,, | Performed by: OBSTETRICS & GYNECOLOGY

## 2022-06-07 PROCEDURE — 3008F PR BODY MASS INDEX (BMI) DOCUMENTED: ICD-10-PCS | Mod: CPTII,S$GLB,, | Performed by: OBSTETRICS & GYNECOLOGY

## 2022-06-07 PROCEDURE — 3080F DIAST BP >= 90 MM HG: CPT | Mod: CPTII,S$GLB,, | Performed by: OBSTETRICS & GYNECOLOGY

## 2022-06-07 PROCEDURE — 3080F PR MOST RECENT DIASTOLIC BLOOD PRESSURE >= 90 MM HG: ICD-10-PCS | Mod: CPTII,S$GLB,, | Performed by: OBSTETRICS & GYNECOLOGY

## 2022-06-07 PROCEDURE — 3077F SYST BP >= 140 MM HG: CPT | Mod: CPTII,S$GLB,, | Performed by: OBSTETRICS & GYNECOLOGY

## 2022-06-07 PROCEDURE — 3077F PR MOST RECENT SYSTOLIC BLOOD PRESSURE >= 140 MM HG: ICD-10-PCS | Mod: CPTII,S$GLB,, | Performed by: OBSTETRICS & GYNECOLOGY

## 2022-06-07 PROCEDURE — 4010F PR ACE/ARB THEARPY RXD/TAKEN: ICD-10-PCS | Mod: CPTII,S$GLB,, | Performed by: OBSTETRICS & GYNECOLOGY

## 2022-06-07 PROCEDURE — 4010F ACE/ARB THERAPY RXD/TAKEN: CPT | Mod: CPTII,S$GLB,, | Performed by: OBSTETRICS & GYNECOLOGY

## 2022-06-07 PROCEDURE — 1160F PR REVIEW ALL MEDS BY PRESCRIBER/CLIN PHARMACIST DOCUMENTED: ICD-10-PCS | Mod: CPTII,S$GLB,, | Performed by: OBSTETRICS & GYNECOLOGY

## 2022-06-07 PROCEDURE — 1160F RVW MEDS BY RX/DR IN RCRD: CPT | Mod: CPTII,S$GLB,, | Performed by: OBSTETRICS & GYNECOLOGY

## 2022-06-07 PROCEDURE — 1159F PR MEDICATION LIST DOCUMENTED IN MEDICAL RECORD: ICD-10-PCS | Mod: CPTII,S$GLB,, | Performed by: OBSTETRICS & GYNECOLOGY

## 2022-06-07 PROCEDURE — 99213 OFFICE O/P EST LOW 20 MIN: CPT | Mod: S$GLB,,, | Performed by: OBSTETRICS & GYNECOLOGY

## 2022-06-07 RX ORDER — SACUBITRIL AND VALSARTAN 49; 51 MG/1; MG/1
1 TABLET, FILM COATED ORAL 2 TIMES DAILY
COMMUNITY
Start: 2022-05-09 | End: 2022-11-29 | Stop reason: DRUGHIGH

## 2022-06-07 NOTE — PROGRESS NOTES
Subjective:   Patient ID: Lilly Loera is a 46 y.o. female who presents for evaluation today.    Chief Complaint:  Follow-up (Blood pressure /Patient needs a referral to a podiatrist /She is also having bloody stools since Friday. )      History of Present Illness  HPI   Pt with congential foot deformity. Was told she needs to see a podiatrist for further evaluation. She would like to see Dr. Zhao.    Bloody stool  Patient reports passing of bloody stools since Friday. States she usually has a BM daily but has only had one BM since Friday. She has taken Milk of Magnesia. Associated symptoms include nausea, decreased appetite, stomach cramps. she denies vomiting. She has a hx of gastric sleeve & stomach ulcers.     HTN  Seeing Dr. Bae for HTN. Recently had antihypertensive regimen adjusted.   Cardiac symptoms: none. Patient specifically denies: blurred vision, chest pain, dyspnea, headache, neck aches, orthopnea, palpitations, paroxysmal nocturnal dyspnea, peripheral edema, pulsating in the ears and tiredness/fatigue.   Medication compliance: taking as prescribed. Use of agents associated with hypertension: estrogens.     FAMILY HISTORY  Family History   Problem Relation Age of Onset    Hypertension Mother     Arthritis Mother     Hypertension Father     Arthritis Father     Alcohol abuse Father     Heart disease Father      SOCIAL HISTORY  Social History     Tobacco Use   Smoking Status Current Every Day Smoker   Smokeless Tobacco Never Used   ,   Social History     Substance and Sexual Activity   Alcohol Use Yes     MEDICATIONS  Outpatient Medications Marked as Taking for the 6/7/22 encounter (Office Visit) with Barb Jaramillo NP   Medication Sig Dispense Refill    budesonide-formoterol 80-4.5 mcg (SYMBICORT) 80-4.5 mcg/actuation HFAA Inhale 2 puffs into the lungs. Controller      buPROPion HCL, smoking deter, (ZYBAN) 150 mg TBSR 12 hr tablet TAKE 1 TABLET BY MOUTH IN THE MORNING FOR THE  "FIRST 3 DAYS THEN 1 TABLET BY MOUTH EVERY 12 HOURS      busPIRone (BUSPAR) 15 MG tablet Pt is to take 1/3 (5 mg) or 1/2 (7.5mgs) or one po HS PRN 30 tablet 11    calcium carbonate (OS-MICHELLE) 500 mg calcium (1,250 mg) chewable tablet Take 1 tablet by mouth once daily.      cyanocobalamin 1,000 mcg/mL injection Inject 1,000 mcg as directed.      ENTRESTO 49-51 mg per tablet Take 1 tablet by mouth 2 (two) times daily.      estradiol 0.05 mg/24 hr td ptsw (PAWAN) 0.05 mg/24 hr APPLY 1 PATCH TOPICALLY TWICE A WEEK 8 patch 11    gabapentin (NEURONTIN) 300 MG capsule Take 300 mg by mouth 2 (two) times daily.      metoprolol succinate (TOPROL-XL) 50 MG 24 hr tablet Take 50 mg by mouth.      multivitamin capsule Take 1 capsule by mouth once daily.      mupirocin (BACTROBAN) 2 % ointment 3 (three) times daily. Apply to affected area      pantoprazole (PROTONIX) 40 MG tablet Take 1 tablet (40 mg total) by mouth once daily. 90 tablet 3    SURE COMFORT INSULIN SYRINGE 1 mL 29 gauge x 1/2" Syrg       tofacitinib (XELJANZ XR) 11 mg Tb24 Take 1 tablet by mouth once daily. 30 tablet 3     Review of Systems   Review of Systems   Constitutional: Negative for activity change, appetite change, fever and unexpected weight change.   HENT: Negative for dental problem, hearing loss, sneezing, sore throat, trouble swallowing and voice change.    Eyes: Negative for visual disturbance.   Respiratory: Negative for choking, chest tightness, shortness of breath and stridor.    Cardiovascular: Negative for chest pain and palpitations.   Gastrointestinal: Positive for abdominal pain, blood in stool, change in bowel habit, constipation, nausea and change in bowel habit. Negative for anal bleeding, vomiting and fecal incontinence.   Endocrine: Negative for polydipsia, polyphagia and polyuria.   Genitourinary: Negative for decreased urine volume, difficulty urinating, dysuria, frequency, hematuria and urgency.   Musculoskeletal: Negative for " "gait problem, joint swelling, neck pain, neck stiffness and joint deformity.   Integumentary:  Negative for color change, pallor, rash, wound and mole/lesion.   Allergic/Immunologic: Negative for immunocompromised state.   Neurological: Negative for vertigo, seizures, syncope, weakness, light-headedness, disturbances in coordination and coordination difficulties.   Hematological: Negative for adenopathy. Does not bruise/bleed easily.   Psychiatric/Behavioral: Negative for agitation, behavioral problems, confusion, hallucinations, sleep disturbance and suicidal ideas.         Objective:    VITALS  BP Readings from Last 1 Encounters:   06/07/22 (!) 142/97   Weight: (!) 150.6 kg (332 lb)   Height: 5' 7" (170.2 cm)   BMI Readings from Last 1 Encounters:   06/07/22 52.00 kg/m²       Physical Exam  Vitals reviewed.   Constitutional:       General: She is not in acute distress.     Appearance: Normal appearance. She is not ill-appearing, toxic-appearing or diaphoretic.   HENT:      Head: Normocephalic and atraumatic.      Right Ear: External ear normal.      Left Ear: External ear normal.      Nose: Nose normal. No congestion or rhinorrhea.   Eyes:      General:         Right eye: No discharge.         Left eye: No discharge.   Cardiovascular:      Rate and Rhythm: Normal rate and regular rhythm.      Heart sounds: Normal heart sounds. No murmur heard.    No friction rub. No gallop.   Pulmonary:      Effort: Pulmonary effort is normal. No respiratory distress.      Breath sounds: Normal breath sounds. No stridor. No wheezing, rhonchi or rales.   Abdominal:      General: Abdomen is flat. Bowel sounds are normal.      Palpations: Abdomen is soft.   Musculoskeletal:         General: Normal range of motion.      Cervical back: Normal range of motion and neck supple.      Right lower leg: No edema.      Left lower leg: No edema.   Skin:     General: Skin is warm and dry.      Coloration: Skin is not jaundiced or pale.      " Findings: No rash.   Neurological:      General: No focal deficit present.      Mental Status: She is alert and oriented to person, place, and time.      Gait: Gait normal.   Psychiatric:         Mood and Affect: Mood normal.         Behavior: Behavior normal.         Thought Content: Thought content normal.         Judgment: Judgment normal.         Assessment:      1. Congenital foot deformity    2. Elevated blood pressure reading    3. Bloody stool       Plan:   Congenital foot deformity  -     Ambulatory referral/consult to Podiatry; Future; Expected date: 06/14/2022  Referral faxed today. Patient instructed to contact our office if a call has not received from the facility to schedule an appointment within the next 2 weeks.     Elevated blood pressure reading  Keep follow up appointment with Dr. Bae as scheduled. Update us with at home BP readings. Advised exercise and low salt diet high in vegetables, whole grains and low in meat/saturated fats. Reviewed importance of taking medication(s) daily. Side effects of medications reviewed. Monitor BP daily at home. Keep a log of values & bring to next appointment. Report any abnormalities to provider. Report to nearest ER with chest pain, shortness of breath, markedly increased BP, dyspnea, headache, visual disturbance, etc.      Bloody stool  Pt instructed to report to ER for further evaluation and to r/o obstruction. Patient verbalizes understanding and denies additional questions at this time.     Patient instructed to contact the clinic should any questions or concerns arise prior to next office visit. Risks, benefits, and alternatives discussed with patient. Patient verbalized understanding of discussed plan of care. Asked patient if any further questions, answered no. Follow up as discussed or sooner PRN.

## 2022-06-14 NOTE — PROGRESS NOTES
Subjective:      Patient ID: Lilly Loera is a 46 y.o. female.    Chief Complaint: Disease Management    HPI:   Includes joint pain with subjective swelling.   Antecedent event includes: None;  Pain location includes: hands, right lower back and hip, knees, ankles;  Gradual onset; beginning >years ago;  Constant ache, sharp, throbbing and numbness, Moderate in severity,   Lasting >hours, Worse at all times;   Improved with rest, change in position and none;   Worsened with activity and overuse;     Review of Systems   Constitutional: Positive for fatigue. Negative for fever and unexpected weight change.   HENT: Negative for mouth dryness, mouth sores and trouble swallowing.    Eyes: Negative for redness.   Respiratory: Negative for cough and shortness of breath.    Cardiovascular: Positive for leg swelling. Negative for chest pain.   Gastrointestinal: Positive for constipation. Negative for diarrhea.   Genitourinary: Negative for dysuria and genital sores.   Musculoskeletal: Positive for arthralgias, joint swelling and myalgias.   Integumentary:  Positive for rash.   Neurological: Positive for memory loss. Negative for headaches.   Hematological: Does not bruise/bleed easily.   Psychiatric/Behavioral: Positive for dysphoric mood and sleep disturbance. The patient is nervous/anxious.       Past Medical History:   Diagnosis Date    Anemia     Anxiety     Arthritis     CHF (congestive heart failure)     Depression     Hidradenitis suppurativa     Hypertension     Polyneuropathy      Past Surgical History:   Procedure Laterality Date    club foot correction      gall blader removed      GASTRIC BYPASS      gastric sleeve      HERNIA REPAIR      HYSTERECTOMY      partial    meniscus repair surgery      repaired ac1      under arm graphs        See the Assessment/Plan for further characterization of the HPI, ROS, Medical, Surgical, Family, and Social Histories including Tobacco use, Meds; all of  "which has been reviewed in Epic.    Medication List with Changes/Refills   Current Medications    BUDESONIDE-FORMOTEROL 80-4.5 MCG (SYMBICORT) 80-4.5 MCG/ACTUATION HFAA    Inhale 2 puffs into the lungs. Controller    BUPROPION HCL, SMOKING DETER, (ZYBAN) 150 MG TBSR 12 HR TABLET    TAKE 1 TABLET BY MOUTH IN THE MORNING FOR THE FIRST 3 DAYS THEN 1 TABLET BY MOUTH EVERY 12 HOURS    BUSPIRONE (BUSPAR) 15 MG TABLET    Pt is to take 1/3 (5 mg) or 1/2 (7.5mgs) or one po HS PRN    CALCIUM CARBONATE (OS-MICHELLE) 500 MG CALCIUM (1,250 MG) CHEWABLE TABLET    Take 1 tablet by mouth once daily.    CYANOCOBALAMIN 1,000 MCG/ML INJECTION    Inject 1,000 mcg as directed.    ENTRESTO 49-51 MG PER TABLET    Take 1 tablet by mouth 2 (two) times daily.    ESTRADIOL 0.05 MG/24 HR TD PTSW (PAWAN) 0.05 MG/24 HR    APPLY 1 PATCH TOPICALLY TWICE A WEEK    GABAPENTIN (NEURONTIN) 300 MG CAPSULE    Take 300 mg by mouth 2 (two) times daily.    IBUPROFEN (ADVIL,MOTRIN) 800 MG TABLET    Take 1 tablet (800 mg total) by mouth every 6 (six) hours as needed for Pain.    METOPROLOL SUCCINATE (TOPROL-XL) 50 MG 24 HR TABLET    Take 50 mg by mouth.    MULTIVITAMIN CAPSULE    Take 1 capsule by mouth once daily.    MUPIROCIN (BACTROBAN) 2 % OINTMENT    3 (three) times daily. Apply to affected area    PANTOPRAZOLE (PROTONIX) 40 MG TABLET    Take 1 tablet (40 mg total) by mouth once daily.    SURE COMFORT INSULIN SYRINGE 1 ML 29 GAUGE X 1/2" SYRG       Changed and/or Refilled Medications    Modified Medication Previous Medication    TOFACITINIB (XELJANZ XR) 11 MG TB24 tofacitinib (XELJANZ XR) 11 mg Tb24       Take 1 tablet by mouth once daily.    Take 1 tablet by mouth once daily.         Objective:   BP (!) 142/92   Pulse 71   Resp 16   Ht 5' 7" (1.702 m)   Wt (!) 148.8 kg (328 lb)   SpO2 98%   BMI 51.37 kg/m²   Physical Exam  Non-toxic appearance. No distress.   Normocephalic and atraumatic.   External ears normal.    Conjunctivae and EOM are normal. No " scleral icterus.   RRR, No friction rub; palpable distal pulses.    No tachypnea or signs of respiratory distress.   Abdominal: No guarding or rebound tenderness.   Neurological: Oriented. Normal thought content. No cranial nerve deficit. Strength is grossly normal without focal deficit.  Skin is warm. No pallor.   Musculoskeletal: see below for further input. No overt synovitis.     No image results found.    Office Visit on 02/11/2022   Component Date Value Ref Range Status    WBC 02/11/2022 6.9  3.8 - 10.8 Thousand/uL Final    RBC 02/11/2022 4.15  3.80 - 5.10 Million/uL Final    Hemoglobin 02/11/2022 12.9  11.7 - 15.5 g/dL Final    Hematocrit 02/11/2022 39.9  35.0 - 45.0 % Final    MCV 02/11/2022 96.1  80.0 - 100.0 fL Final    MCH 02/11/2022 31.1  27.0 - 33.0 pg Final    MCHC 02/11/2022 32.3  32.0 - 36.0 g/dL Final    RDW 02/11/2022 12.1  11.0 - 15.0 % Final    Platelets 02/11/2022 298  140 - 400 Thousand/uL Final    MPV 02/11/2022 10.5  7.5 - 12.5 fL Final    Neutrophils, Abs 02/11/2022 4,388  1,500 - 7,800 cells/uL Final    Lymph # 02/11/2022 1,925  850 - 3,900 cells/uL Final    Mono # 02/11/2022 449  200 - 950 cells/uL Final    Eos # 02/11/2022 117  15 - 500 cells/uL Final    Baso # 02/11/2022 21  0 - 200 cells/uL Final    Neutrophils Relative 02/11/2022 63.6  % Final    Lymph % 02/11/2022 27.9  % Final    Mono % 02/11/2022 6.5  % Final    Eosinophil % 02/11/2022 1.7  % Final    Basophil % 02/11/2022 0.3  % Final    Glucose 02/11/2022 95  65 - 99 mg/dL Final    Comment:               Fasting reference interval         BUN 02/11/2022 19  7 - 25 mg/dL Final    Creatinine 02/11/2022 0.91  0.50 - 1.10 mg/dL Final    eGFR if non African American 02/11/2022 77  > OR = 60 mL/min/1.73m2 Final    eGFR if  02/11/2022 89  > OR = 60 mL/min/1.73m2 Final    BUN/Creatinine Ratio 02/11/2022 NOT APPLICABLE  6 - 22 (calc) Final    Sodium 02/11/2022 140  135 - 146 mmol/L Final     Potassium 02/11/2022 4.1  3.5 - 5.3 mmol/L Final    Chloride 02/11/2022 105  98 - 110 mmol/L Final    CO2 02/11/2022 29  20 - 32 mmol/L Final    Calcium 02/11/2022 9.5  8.6 - 10.2 mg/dL Final    Total Protein 02/11/2022 7.3  6.1 - 8.1 g/dL Final    Albumin 02/11/2022 4.0  3.6 - 5.1 g/dL Final    Globulin, Total 02/11/2022 3.3  1.9 - 3.7 g/dL (calc) Final    Albumin/Globulin Ratio 02/11/2022 1.2  1.0 - 2.5 (calc) Final    Total Bilirubin 02/11/2022 1.0  0.2 - 1.2 mg/dL Final    Alkaline Phosphatase 02/11/2022 73  31 - 125 U/L Final    AST 02/11/2022 19  10 - 30 U/L Final    ALT 02/11/2022 12  6 - 29 U/L Final    TSH 02/11/2022 1.27  mIU/L Final    Comment:           Reference Range                         > or = 20 Years  0.40-4.50                              Pregnancy Ranges            First trimester    0.26-2.66            Second trimester   0.55-2.73            Third trimester    0.43-2.91      Cholesterol 02/11/2022 155  <200 mg/dL Final    HDL 02/11/2022 52  > OR = 50 mg/dL Final    Triglycerides 02/11/2022 68  <150 mg/dL Final    LDL Cholesterol 02/11/2022 88  mg/dL (calc) Final    Comment: Reference range: <100     Desirable range <100 mg/dL for primary prevention;    <70 mg/dL for patients with CHD or diabetic patients   with > or = 2 CHD risk factors.     LDL-C is now calculated using the Clint-Haro   calculation, which is a validated novel method providing   better accuracy than the Friedewald equation in the   estimation of LDL-C.   Clint ZACARIAS et al. JEFFERY. 2013;310(19): 3118-9694   (http://education.D[employer]/faq/AHJ974)      HDL/Cholesterol Ratio 02/11/2022 3.0  <5.0 (calc) Final    Non HDL Chol. (LDL+VLDL) 02/11/2022 103  <130 mg/dL (calc) Final    Comment: For patients with diabetes plus 1 major ASCVD risk   factor, treating to a non-HDL-C goal of <100 mg/dL   (LDL-C of <70 mg/dL) is considered a therapeutic   option.      Vitamin B-12 02/11/2022 >2000 (A) 200 - 1100  pg/mL Final        All of the data above and below has been reviewed by myself and any further interpretations will be reflected in the assessment and plan.   The data includes review of external notes, and independent interpretation of lab results, x-rays, and imaging reports.  Active inflammatory arthritis is an illness that poses a threat to bodily function and even a threat to life in some cases.  Drug therapy to treat inflammatory arthritis usually requires intensive monitoring for toxicity.    Review of patient's allergies indicates:   Allergen Reactions    Adhesive Blisters, Hives, Itching and Swelling    Adhesive tape-silicones      whelp and redness     Assessment/Plan:       B12>2000  Rheumatoid arthritis per limited records    Previously working with Dr. Washington    We will seek records from Dr. Washington    There is also some mechanical and neurogenic pain.    Neuropathy followed by Neurology per records    Prior knee meniscus surgery    Had been working with Podiatry    Congenital foot deformity    Prior club foot reconstruction surgery from birth per records    Gastric bypass surgery  History B12 deficiency    Has been managing with:    xeljanz 11mg daily    Prior humira when she was seeing Dr. Samuel for Hidradenitis then had skin reaction hives thought from [facility] noted    Prior problem with Methotrexate nausea    Prior Prednisone 5mg    ibuprofen 800mg prn    Neurontin 300mg bid    CaD    She tells me last week she could not walk and often needs a walker noted    She does not recall ever getting xrays for her joints    She has not seen Dr. Washington in years and PCP has been giving xeljanz and intermittent steroids prednisone    No overt synovitis  Deformities including ankles feet  DJD  Edema in feet lower legs  Hyperpigmentation skin changes noted      Verbal education      Blood work and xrays to further evaluate    We will seek records from Dr. Washington    We will seek records from Dermatology Dr. Samuel  and she tells me she is working on getting back with Dermatology     She will consider also working with Orthopedics and Physical Medicine and Rehab MD if needed    Cont xeljanz 11mg daily    ibuprofen 800mg prn    Revisit weeks     Contact us prn      Has been working with:    DM insulin    CHF followed by Cardiology    Hidradenitis per records     Also with history of rashes and hives    We will seek records from Dermatology and she tells me she is working on getting back with Dermatology     Smoking cessation    We will seek records.    Records PCP Dr. Puckett some of recent note 2/11/22:    (( Establish Care (Pt had 2 falls that broke her arm), Referral (Pt need referral for a dietician and also a referral to a mental health facility. ), and Dizziness (Pt stated the dizziness until pt had covid)  HPI   Was seeing Dr Washington and states she has had a bunch of lab work but no appointment. She has been on methotrexate in the past but it made her sick    Patient states she had a pneumonia and influenza vaccine in 2018 and she had a severe reaction. She states she was waiting on Dr Washington to let her know if she is safe to get the covid vaccine. She states she has had it twice now    Her podatrist is Dr Cryer. 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 A brace has been ordered. She also has polyneuropathy which was discovered by a neurologist      Dr Bae is her Cardiologist    Neurologist is Dr Bai          Past Medical History:   Diagnosis Date    Anemia      Anxiety      Arthritis      CHF (congestive heart failure)      Depression      Hidradenitis suppurativa      Hypertension      Polyneuropathy              Past Surgical History:   Procedure Laterality Date    club foot correction        gall blader removed        GASTRIC BYPASS        gastric sleeve        HERNIA REPAIR        HYSTERECTOMY         partial    meniscus repair surgery        repaired ac1        under arm graphs           Assessment:          ICD-10-CM ICD-9-CM   1. Polyarthritis  M13.0 716.50   2. Obesity, morbid  E66.01 278.01   3. Encounter for screening for malignant neoplasm of breast, unspecified screening modality  Z12.39 V76.10   4. Encounter for screening mammogram for malignant neoplasm of breast   Z12.31 V76.12   5. Hypertension, unspecified type  I10 401.9   6. H/O bariatric surgery  Z98.84 V45.86   7. Low vitamin B12 level  E53.8 266.2       Plan:     Polyarthritis  -     Ambulatory referral/consult to Rheumatology; Future; Expected date: 02/18/2022  -     CBC Auto Differential; Future; Expected date: 02/11/2022  -     Comprehensive Metabolic Panel; Future; Expected date: 02/11/2022  -     TSH; Future; Expected date: 02/11/2022  -     Lipid Panel; Future; Expected date: 02/11/2022  -     Vitamin B12; Future; Expected date: 02/11/2022     Obesity, morbid     Encounter for screening for malignant neoplasm of breast, unspecified screening modality  -     Mammo Digital Screening Bilat; Future; Expected date: 02/11/2022     Encounter for screening mammogram for malignant neoplasm of breast   -     Mammo Digital Screening Bilat; Future; Expected date: 02/11/2022     Hypertension, unspecified type  -     CBC Auto Differential; Future; Expected date: 02/11/2022  -     Comprehensive Metabolic Panel; Future; Expected date: 02/11/2022  -     TSH; Future; Expected date: 02/11/2022  -     Lipid Panel; Future; Expected date: 02/11/2022  -     Vitamin B12; Future; Expected date: 02/11/2022     H/O bariatric surgery  -     CBC Auto Differential; Future; Expected date: 02/11/2022  -     Comprehensive Metabolic Panel; Future; Expected date: 02/11/2022  -     TSH; Future; Expected date: 02/11/2022  -     Lipid Panel; Future; Expected date: 02/11/2022  -     Vitamin B12; Future; Expected date: 02/11/2022     Low vitamin B12 level  -     Vitamin B12; Future; Expected date: 02/11/2022))    Review of medical records as stated above.  Lab  +/- imaging and other ordered diagnostic studies to further evaluate.  See the orders associated with this note visit.  Medications as prescribed as tolerated.    Education including verbal and those noted in the patient instructions.  Revisit as scheduled and call prn.    The following diagnoses influence medical decision making and/or need further workup including the orders listed below:    Rheumatoid arthritis involving multiple sites, unspecified whether rheumatoid factor present  -     AMBROCIO by IFA, w/Rflx; Future; Expected date: 06/28/2022  -     Protein Electrophoresis, Serum; Future; Expected date: 06/28/2022  -     Quantiferon Gold TB; Future; Expected date: 06/28/2022  -     Rheumatoid Factor; Future; Expected date: 06/28/2022  -     Sedimentation rate; Future; Expected date: 06/28/2022  -     Uric Acid; Future; Expected date: 06/28/2022  -     Urinalysis; Future; Expected date: 06/28/2022  -     Cyclic Citrullinated Peptide Antibody, IgG; Future; Expected date: 06/28/2022  -     C-Reactive Protein; Future; Expected date: 06/28/2022  -     Comprehensive Metabolic Panel; Future; Expected date: 06/28/2022  -     CK; Future; Expected date: 06/28/2022  -     CBC Auto Differential; Future; Expected date: 06/28/2022  -     C4 Complement; Future; Expected date: 06/28/2022  -     C3 Complement; Future; Expected date: 06/28/2022  -     Hepatitis C Antibody; Future; Expected date: 06/28/2022  -     Hepatitis B Surface Antigen; Future; Expected date: 06/28/2022  -     Hepatitis B Core Antibody, Total; Future; Expected date: 06/28/2022  -     Hepatitis B Surface Ab, Qualitative; Future; Expected date: 06/28/2022  -     X-Ray Hand 3 View Bilateral; Future; Expected date: 06/28/2022  -     X-Ray Hips Bilateral 2 View Inc AP Pelvis; Future; Expected date: 06/28/2022  -     X-Ray Chest PA And Lateral; Future; Expected date: 06/28/2022  -     X-Ray Cervical Spine AP And Lateral; Future; Expected date: 06/28/2022  -      X-ray AP Standing Knees with Both Latera; Future; Expected date: 06/28/2022  -     X-Ray Foot 2 View Bilateral; Future; Expected date: 06/28/2022  -     X-Ray Lumbar Spine AP And Lateral; Future; Expected date: 06/28/2022  -     tofacitinib (XELJANZ XR) 11 mg Tb24; Take 1 tablet by mouth once daily.  Dispense: 30 tablet; Refill: 3    Pain in other joint  -     AMBROCIO by IFA, w/Rflx; Future; Expected date: 06/28/2022  -     Protein Electrophoresis, Serum; Future; Expected date: 06/28/2022  -     Quantiferon Gold TB; Future; Expected date: 06/28/2022  -     Rheumatoid Factor; Future; Expected date: 06/28/2022  -     Sedimentation rate; Future; Expected date: 06/28/2022  -     Uric Acid; Future; Expected date: 06/28/2022  -     Urinalysis; Future; Expected date: 06/28/2022  -     Cyclic Citrullinated Peptide Antibody, IgG; Future; Expected date: 06/28/2022  -     C-Reactive Protein; Future; Expected date: 06/28/2022  -     Comprehensive Metabolic Panel; Future; Expected date: 06/28/2022  -     CK; Future; Expected date: 06/28/2022  -     CBC Auto Differential; Future; Expected date: 06/28/2022  -     C4 Complement; Future; Expected date: 06/28/2022  -     C3 Complement; Future; Expected date: 06/28/2022  -     Hepatitis C Antibody; Future; Expected date: 06/28/2022  -     Hepatitis B Surface Antigen; Future; Expected date: 06/28/2022  -     Hepatitis B Core Antibody, Total; Future; Expected date: 06/28/2022  -     Hepatitis B Surface Ab, Qualitative; Future; Expected date: 06/28/2022    Polyarthralgia  -     AMBROCIO by IFA, w/Rflx; Future; Expected date: 06/28/2022  -     Protein Electrophoresis, Serum; Future; Expected date: 06/28/2022  -     Quantiferon Gold TB; Future; Expected date: 06/28/2022  -     Rheumatoid Factor; Future; Expected date: 06/28/2022  -     Sedimentation rate; Future; Expected date: 06/28/2022  -     Uric Acid; Future; Expected date: 06/28/2022  -     Urinalysis; Future; Expected date: 06/28/2022  -      Cyclic Citrullinated Peptide Antibody, IgG; Future; Expected date: 06/28/2022  -     C-Reactive Protein; Future; Expected date: 06/28/2022  -     Comprehensive Metabolic Panel; Future; Expected date: 06/28/2022  -     CK; Future; Expected date: 06/28/2022  -     CBC Auto Differential; Future; Expected date: 06/28/2022  -     C4 Complement; Future; Expected date: 06/28/2022  -     C3 Complement; Future; Expected date: 06/28/2022  -     Hepatitis C Antibody; Future; Expected date: 06/28/2022  -     Hepatitis B Surface Antigen; Future; Expected date: 06/28/2022  -     Hepatitis B Core Antibody, Total; Future; Expected date: 06/28/2022  -     Hepatitis B Surface Ab, Qualitative; Future; Expected date: 06/28/2022  -     X-Ray Hand 3 View Bilateral; Future; Expected date: 06/28/2022  -     X-Ray Hips Bilateral 2 View Inc AP Pelvis; Future; Expected date: 06/28/2022  -     X-Ray Chest PA And Lateral; Future; Expected date: 06/28/2022  -     X-Ray Cervical Spine AP And Lateral; Future; Expected date: 06/28/2022  -     X-ray AP Standing Knees with Both Latera; Future; Expected date: 06/28/2022  -     X-Ray Foot 2 View Bilateral; Future; Expected date: 06/28/2022  -     X-Ray Lumbar Spine AP And Lateral; Future; Expected date: 06/28/2022    Other osteoarthritis involving multiple joints  -     AMBROCIO by IFA, w/Rflx; Future; Expected date: 06/28/2022  -     Protein Electrophoresis, Serum; Future; Expected date: 06/28/2022  -     Quantiferon Gold TB; Future; Expected date: 06/28/2022  -     Rheumatoid Factor; Future; Expected date: 06/28/2022  -     Sedimentation rate; Future; Expected date: 06/28/2022  -     Uric Acid; Future; Expected date: 06/28/2022  -     Urinalysis; Future; Expected date: 06/28/2022  -     Cyclic Citrullinated Peptide Antibody, IgG; Future; Expected date: 06/28/2022  -     C-Reactive Protein; Future; Expected date: 06/28/2022  -     Comprehensive Metabolic Panel; Future; Expected date: 06/28/2022  -     CK;  Future; Expected date: 06/28/2022  -     CBC Auto Differential; Future; Expected date: 06/28/2022  -     C4 Complement; Future; Expected date: 06/28/2022  -     C3 Complement; Future; Expected date: 06/28/2022  -     Hepatitis C Antibody; Future; Expected date: 06/28/2022  -     Hepatitis B Surface Antigen; Future; Expected date: 06/28/2022  -     Hepatitis B Core Antibody, Total; Future; Expected date: 06/28/2022  -     Hepatitis B Surface Ab, Qualitative; Future; Expected date: 06/28/2022  -     X-Ray Hand 3 View Bilateral; Future; Expected date: 06/28/2022  -     X-Ray Hips Bilateral 2 View Inc AP Pelvis; Future; Expected date: 06/28/2022  -     X-Ray Chest PA And Lateral; Future; Expected date: 06/28/2022  -     X-Ray Cervical Spine AP And Lateral; Future; Expected date: 06/28/2022  -     X-ray AP Standing Knees with Both Latera; Future; Expected date: 06/28/2022  -     X-Ray Foot 2 View Bilateral; Future; Expected date: 06/28/2022  -     X-Ray Lumbar Spine AP And Lateral; Future; Expected date: 06/28/2022    Neuropathy  -     AMBROCIO by IFA, w/Rflx; Future; Expected date: 06/28/2022  -     Protein Electrophoresis, Serum; Future; Expected date: 06/28/2022  -     Quantiferon Gold TB; Future; Expected date: 06/28/2022  -     Rheumatoid Factor; Future; Expected date: 06/28/2022  -     Sedimentation rate; Future; Expected date: 06/28/2022  -     Uric Acid; Future; Expected date: 06/28/2022  -     Urinalysis; Future; Expected date: 06/28/2022  -     Cyclic Citrullinated Peptide Antibody, IgG; Future; Expected date: 06/28/2022  -     C-Reactive Protein; Future; Expected date: 06/28/2022  -     Comprehensive Metabolic Panel; Future; Expected date: 06/28/2022  -     CK; Future; Expected date: 06/28/2022  -     CBC Auto Differential; Future; Expected date: 06/28/2022  -     C4 Complement; Future; Expected date: 06/28/2022  -     C3 Complement; Future; Expected date: 06/28/2022  -     Hepatitis C Antibody; Future; Expected  date: 06/28/2022  -     Hepatitis B Surface Antigen; Future; Expected date: 06/28/2022  -     Hepatitis B Core Antibody, Total; Future; Expected date: 06/28/2022  -     Hepatitis B Surface Ab, Qualitative; Future; Expected date: 06/28/2022    Myalgia  -     AMBROCIO by IFA, w/Rflx; Future; Expected date: 06/28/2022  -     Protein Electrophoresis, Serum; Future; Expected date: 06/28/2022  -     Quantiferon Gold TB; Future; Expected date: 06/28/2022  -     Rheumatoid Factor; Future; Expected date: 06/28/2022  -     Sedimentation rate; Future; Expected date: 06/28/2022  -     Uric Acid; Future; Expected date: 06/28/2022  -     Urinalysis; Future; Expected date: 06/28/2022  -     Cyclic Citrullinated Peptide Antibody, IgG; Future; Expected date: 06/28/2022  -     C-Reactive Protein; Future; Expected date: 06/28/2022  -     Comprehensive Metabolic Panel; Future; Expected date: 06/28/2022  -     CK; Future; Expected date: 06/28/2022  -     CBC Auto Differential; Future; Expected date: 06/28/2022  -     C4 Complement; Future; Expected date: 06/28/2022  -     C3 Complement; Future; Expected date: 06/28/2022  -     Hepatitis C Antibody; Future; Expected date: 06/28/2022  -     Hepatitis B Surface Antigen; Future; Expected date: 06/28/2022  -     Hepatitis B Core Antibody, Total; Future; Expected date: 06/28/2022  -     Hepatitis B Surface Ab, Qualitative; Future; Expected date: 06/28/2022    History of non anemic vitamin B12 deficiency  -     AMBROCIO by IFA, w/Rflx; Future; Expected date: 06/28/2022  -     Protein Electrophoresis, Serum; Future; Expected date: 06/28/2022  -     Quantiferon Gold TB; Future; Expected date: 06/28/2022  -     Rheumatoid Factor; Future; Expected date: 06/28/2022  -     Sedimentation rate; Future; Expected date: 06/28/2022  -     Uric Acid; Future; Expected date: 06/28/2022  -     Urinalysis; Future; Expected date: 06/28/2022  -     Cyclic Citrullinated Peptide Antibody, IgG; Future; Expected date:  06/28/2022  -     C-Reactive Protein; Future; Expected date: 06/28/2022  -     Comprehensive Metabolic Panel; Future; Expected date: 06/28/2022  -     CK; Future; Expected date: 06/28/2022  -     CBC Auto Differential; Future; Expected date: 06/28/2022  -     C4 Complement; Future; Expected date: 06/28/2022  -     C3 Complement; Future; Expected date: 06/28/2022  -     Hepatitis C Antibody; Future; Expected date: 06/28/2022  -     Hepatitis B Surface Antigen; Future; Expected date: 06/28/2022  -     Hepatitis B Core Antibody, Total; Future; Expected date: 06/28/2022  -     Hepatitis B Surface Ab, Qualitative; Future; Expected date: 06/28/2022    Screening for rheumatic disorder  -     AMBROCIO by IFA, w/Rflx; Future; Expected date: 06/28/2022  -     Protein Electrophoresis, Serum; Future; Expected date: 06/28/2022  -     Quantiferon Gold TB; Future; Expected date: 06/28/2022  -     Rheumatoid Factor; Future; Expected date: 06/28/2022  -     Sedimentation rate; Future; Expected date: 06/28/2022  -     Uric Acid; Future; Expected date: 06/28/2022  -     Urinalysis; Future; Expected date: 06/28/2022  -     Cyclic Citrullinated Peptide Antibody, IgG; Future; Expected date: 06/28/2022  -     C-Reactive Protein; Future; Expected date: 06/28/2022  -     Comprehensive Metabolic Panel; Future; Expected date: 06/28/2022  -     CK; Future; Expected date: 06/28/2022  -     CBC Auto Differential; Future; Expected date: 06/28/2022  -     C4 Complement; Future; Expected date: 06/28/2022  -     C3 Complement; Future; Expected date: 06/28/2022  -     Hepatitis C Antibody; Future; Expected date: 06/28/2022  -     Hepatitis B Surface Antigen; Future; Expected date: 06/28/2022  -     Hepatitis B Core Antibody, Total; Future; Expected date: 06/28/2022  -     Hepatitis B Surface Ab, Qualitative; Future; Expected date: 06/28/2022  -     X-Ray Hand 3 View Bilateral; Future; Expected date: 06/28/2022  -     X-Ray Hips Bilateral 2 View Inc AP  Pelvis; Future; Expected date: 06/28/2022  -     X-Ray Chest PA And Lateral; Future; Expected date: 06/28/2022  -     X-Ray Cervical Spine AP And Lateral; Future; Expected date: 06/28/2022  -     X-ray AP Standing Knees with Both Latera; Future; Expected date: 06/28/2022  -     X-Ray Foot 2 View Bilateral; Future; Expected date: 06/28/2022  -     X-Ray Lumbar Spine AP And Lateral; Future; Expected date: 06/28/2022    Screening-pulmonary TB  -     AMBROCIO by IFA, w/Rflx; Future; Expected date: 06/28/2022  -     Protein Electrophoresis, Serum; Future; Expected date: 06/28/2022  -     Quantiferon Gold TB; Future; Expected date: 06/28/2022  -     Rheumatoid Factor; Future; Expected date: 06/28/2022  -     Sedimentation rate; Future; Expected date: 06/28/2022  -     Uric Acid; Future; Expected date: 06/28/2022  -     Urinalysis; Future; Expected date: 06/28/2022  -     Cyclic Citrullinated Peptide Antibody, IgG; Future; Expected date: 06/28/2022  -     C-Reactive Protein; Future; Expected date: 06/28/2022  -     Comprehensive Metabolic Panel; Future; Expected date: 06/28/2022  -     CK; Future; Expected date: 06/28/2022  -     CBC Auto Differential; Future; Expected date: 06/28/2022  -     C4 Complement; Future; Expected date: 06/28/2022  -     C3 Complement; Future; Expected date: 06/28/2022  -     Hepatitis C Antibody; Future; Expected date: 06/28/2022  -     Hepatitis B Surface Antigen; Future; Expected date: 06/28/2022  -     Hepatitis B Core Antibody, Total; Future; Expected date: 06/28/2022  -     Hepatitis B Surface Ab, Qualitative; Future; Expected date: 06/28/2022  -     X-Ray Chest PA And Lateral; Future; Expected date: 06/28/2022    Encounter for hepatitis C screening test for low risk patient  -     AMBROCIO by IFA, w/Rflx; Future; Expected date: 06/28/2022  -     Protein Electrophoresis, Serum; Future; Expected date: 06/28/2022  -     Quantiferon Gold TB; Future; Expected date: 06/28/2022  -     Rheumatoid Factor;  Future; Expected date: 06/28/2022  -     Sedimentation rate; Future; Expected date: 06/28/2022  -     Uric Acid; Future; Expected date: 06/28/2022  -     Urinalysis; Future; Expected date: 06/28/2022  -     Cyclic Citrullinated Peptide Antibody, IgG; Future; Expected date: 06/28/2022  -     C-Reactive Protein; Future; Expected date: 06/28/2022  -     Comprehensive Metabolic Panel; Future; Expected date: 06/28/2022  -     CK; Future; Expected date: 06/28/2022  -     CBC Auto Differential; Future; Expected date: 06/28/2022  -     C4 Complement; Future; Expected date: 06/28/2022  -     C3 Complement; Future; Expected date: 06/28/2022  -     Hepatitis C Antibody; Future; Expected date: 06/28/2022  -     Hepatitis B Surface Antigen; Future; Expected date: 06/28/2022  -     Hepatitis B Core Antibody, Total; Future; Expected date: 06/28/2022  -     Hepatitis B Surface Ab, Qualitative; Future; Expected date: 06/28/2022    Medication monitoring encounter  -     AMBROCIO by IFA, w/Rflx; Future; Expected date: 06/28/2022  -     Protein Electrophoresis, Serum; Future; Expected date: 06/28/2022  -     Quantiferon Gold TB; Future; Expected date: 06/28/2022  -     Rheumatoid Factor; Future; Expected date: 06/28/2022  -     Sedimentation rate; Future; Expected date: 06/28/2022  -     Uric Acid; Future; Expected date: 06/28/2022  -     Urinalysis; Future; Expected date: 06/28/2022  -     Cyclic Citrullinated Peptide Antibody, IgG; Future; Expected date: 06/28/2022  -     C-Reactive Protein; Future; Expected date: 06/28/2022  -     Comprehensive Metabolic Panel; Future; Expected date: 06/28/2022  -     CK; Future; Expected date: 06/28/2022  -     CBC Auto Differential; Future; Expected date: 06/28/2022  -     C4 Complement; Future; Expected date: 06/28/2022  -     C3 Complement; Future; Expected date: 06/28/2022  -     Hepatitis C Antibody; Future; Expected date: 06/28/2022  -     Hepatitis B Surface Antigen; Future; Expected date:  06/28/2022  -     Hepatitis B Core Antibody, Total; Future; Expected date: 06/28/2022  -     Hepatitis B Surface Ab, Qualitative; Future; Expected date: 06/28/2022      Follow up in about 4 weeks (around 7/26/2022).     Fabrice Asencio MD

## 2022-06-20 ENCOUNTER — PATIENT MESSAGE (OUTPATIENT)
Dept: FAMILY MEDICINE | Facility: CLINIC | Age: 47
End: 2022-06-20
Payer: MEDICARE

## 2022-06-20 DIAGNOSIS — M19.90 OSTEOARTHRITIS, UNSPECIFIED OSTEOARTHRITIS TYPE, UNSPECIFIED SITE: Primary | ICD-10-CM

## 2022-06-20 RX ORDER — IBUPROFEN 800 MG/1
800 TABLET ORAL EVERY 6 HOURS PRN
Qty: 60 TABLET | Refills: 1 | Status: SHIPPED | OUTPATIENT
Start: 2022-06-20 | End: 2023-02-03

## 2022-06-28 ENCOUNTER — OFFICE VISIT (OUTPATIENT)
Dept: RHEUMATOLOGY | Facility: CLINIC | Age: 47
End: 2022-06-28
Payer: MEDICARE

## 2022-06-28 VITALS
RESPIRATION RATE: 16 BRPM | DIASTOLIC BLOOD PRESSURE: 92 MMHG | HEIGHT: 67 IN | BODY MASS INDEX: 45.99 KG/M2 | SYSTOLIC BLOOD PRESSURE: 142 MMHG | HEART RATE: 71 BPM | OXYGEN SATURATION: 98 % | WEIGHT: 293 LBS

## 2022-06-28 DIAGNOSIS — M79.10 MYALGIA: ICD-10-CM

## 2022-06-28 DIAGNOSIS — M25.50 POLYARTHRALGIA: ICD-10-CM

## 2022-06-28 DIAGNOSIS — Z11.1 SCREENING-PULMONARY TB: ICD-10-CM

## 2022-06-28 DIAGNOSIS — Z86.39 HISTORY OF NON ANEMIC VITAMIN B12 DEFICIENCY: ICD-10-CM

## 2022-06-28 DIAGNOSIS — Z13.89 SCREENING FOR RHEUMATIC DISORDER: ICD-10-CM

## 2022-06-28 DIAGNOSIS — Z11.59 ENCOUNTER FOR HEPATITIS C SCREENING TEST FOR LOW RISK PATIENT: ICD-10-CM

## 2022-06-28 DIAGNOSIS — G62.9 NEUROPATHY: ICD-10-CM

## 2022-06-28 DIAGNOSIS — M25.59 PAIN IN OTHER JOINT: ICD-10-CM

## 2022-06-28 DIAGNOSIS — M06.9 RHEUMATOID ARTHRITIS INVOLVING MULTIPLE SITES, UNSPECIFIED WHETHER RHEUMATOID FACTOR PRESENT: Primary | ICD-10-CM

## 2022-06-28 DIAGNOSIS — Z51.81 MEDICATION MONITORING ENCOUNTER: ICD-10-CM

## 2022-06-28 DIAGNOSIS — M15.8 OTHER OSTEOARTHRITIS INVOLVING MULTIPLE JOINTS: ICD-10-CM

## 2022-06-28 PROCEDURE — 4010F ACE/ARB THERAPY RXD/TAKEN: CPT | Mod: CPTII,S$GLB,, | Performed by: INTERNAL MEDICINE

## 2022-06-28 PROCEDURE — 1160F PR REVIEW ALL MEDS BY PRESCRIBER/CLIN PHARMACIST DOCUMENTED: ICD-10-PCS | Mod: CPTII,S$GLB,, | Performed by: INTERNAL MEDICINE

## 2022-06-28 PROCEDURE — 1160F RVW MEDS BY RX/DR IN RCRD: CPT | Mod: CPTII,S$GLB,, | Performed by: INTERNAL MEDICINE

## 2022-06-28 PROCEDURE — 3077F SYST BP >= 140 MM HG: CPT | Mod: CPTII,S$GLB,, | Performed by: INTERNAL MEDICINE

## 2022-06-28 PROCEDURE — 4010F PR ACE/ARB THEARPY RXD/TAKEN: ICD-10-PCS | Mod: CPTII,S$GLB,, | Performed by: INTERNAL MEDICINE

## 2022-06-28 PROCEDURE — 3077F PR MOST RECENT SYSTOLIC BLOOD PRESSURE >= 140 MM HG: ICD-10-PCS | Mod: CPTII,S$GLB,, | Performed by: INTERNAL MEDICINE

## 2022-06-28 PROCEDURE — 99204 OFFICE O/P NEW MOD 45 MIN: CPT | Mod: S$GLB,,, | Performed by: INTERNAL MEDICINE

## 2022-06-28 PROCEDURE — 3080F PR MOST RECENT DIASTOLIC BLOOD PRESSURE >= 90 MM HG: ICD-10-PCS | Mod: CPTII,S$GLB,, | Performed by: INTERNAL MEDICINE

## 2022-06-28 PROCEDURE — 3008F PR BODY MASS INDEX (BMI) DOCUMENTED: ICD-10-PCS | Mod: CPTII,S$GLB,, | Performed by: INTERNAL MEDICINE

## 2022-06-28 PROCEDURE — 99204 PR OFFICE/OUTPT VISIT, NEW, LEVL IV, 45-59 MIN: ICD-10-PCS | Mod: S$GLB,,, | Performed by: INTERNAL MEDICINE

## 2022-06-28 PROCEDURE — 1159F MED LIST DOCD IN RCRD: CPT | Mod: CPTII,S$GLB,, | Performed by: INTERNAL MEDICINE

## 2022-06-28 PROCEDURE — 3008F BODY MASS INDEX DOCD: CPT | Mod: CPTII,S$GLB,, | Performed by: INTERNAL MEDICINE

## 2022-06-28 PROCEDURE — 3080F DIAST BP >= 90 MM HG: CPT | Mod: CPTII,S$GLB,, | Performed by: INTERNAL MEDICINE

## 2022-06-28 PROCEDURE — 1159F PR MEDICATION LIST DOCUMENTED IN MEDICAL RECORD: ICD-10-PCS | Mod: CPTII,S$GLB,, | Performed by: INTERNAL MEDICINE

## 2022-06-28 RX ORDER — TOFACITINIB 11 MG/1
1 TABLET, FILM COATED, EXTENDED RELEASE ORAL DAILY
Qty: 30 TABLET | Refills: 3 | Status: SHIPPED | OUTPATIENT
Start: 2022-06-28 | End: 2022-08-03 | Stop reason: SDUPTHER

## 2022-07-14 ENCOUNTER — PATIENT MESSAGE (OUTPATIENT)
Dept: FAMILY MEDICINE | Facility: CLINIC | Age: 47
End: 2022-07-14
Payer: MEDICARE

## 2022-07-18 DIAGNOSIS — N95.1 MENOPAUSAL SYNDROME (HOT FLASHES): ICD-10-CM

## 2022-07-18 RX ORDER — ESTRADIOL 0.05 MG/D
FILM, EXTENDED RELEASE TRANSDERMAL
Qty: 8 PATCH | Refills: 11 | Status: SHIPPED | OUTPATIENT
Start: 2022-07-18 | End: 2022-07-21 | Stop reason: SDUPTHER

## 2022-07-20 ENCOUNTER — PATIENT MESSAGE (OUTPATIENT)
Dept: OBSTETRICS AND GYNECOLOGY | Facility: CLINIC | Age: 47
End: 2022-07-20
Payer: MEDICARE

## 2022-07-20 ENCOUNTER — PATIENT MESSAGE (OUTPATIENT)
Dept: FAMILY MEDICINE | Facility: CLINIC | Age: 47
End: 2022-07-20
Payer: MEDICARE

## 2022-07-20 DIAGNOSIS — F17.210 CIGARETTE NICOTINE DEPENDENCE, UNCOMPLICATED: Primary | ICD-10-CM

## 2022-07-20 DIAGNOSIS — G62.9 NEUROPATHY: ICD-10-CM

## 2022-07-20 DIAGNOSIS — F41.9 ANXIETY: ICD-10-CM

## 2022-07-20 DIAGNOSIS — N95.1 MENOPAUSAL SYNDROME (HOT FLASHES): ICD-10-CM

## 2022-07-20 DIAGNOSIS — Z76.89 ENCOUNTER TO ESTABLISH CARE: ICD-10-CM

## 2022-07-20 NOTE — PROGRESS NOTES
Subjective:      Patient ID: Lilly Loera is a 47 y.o. female.    Chief Complaint: Disease Management    HPI:   Includes joint pain with subjective swelling.   Antecedent event includes: None;  Pain location includes: joints, hips, knees and ankles;  Gradual onset; beginning >years ago;  Constant ache, Mild in severity,   Lasting >minutes, Worse during the daytime;   Improved with rest, medication, change in position and none;   Worsened with activity and overuse;     Review of Systems   Constitutional: Positive for fatigue. Negative for fever and unexpected weight change.   HENT: Negative for mouth sores and trouble swallowing.    Eyes: Negative for redness.   Respiratory: Negative for cough and shortness of breath.    Cardiovascular: Negative for chest pain.   Gastrointestinal: Negative for constipation and diarrhea.   Genitourinary: Negative for dysuria and genital sores.   Musculoskeletal: Positive for arthralgias, joint swelling and myalgias.   Integumentary:  Positive for rash.   Neurological: Positive for headaches.   Hematological: Does not bruise/bleed easily.   Psychiatric/Behavioral: Negative for sleep disturbance.      Past Medical History:   Diagnosis Date    Anemia     Anxiety     Arthritis     CHF (congestive heart failure)     Depression     Hidradenitis suppurativa     Hypertension     Polyneuropathy      Past Surgical History:   Procedure Laterality Date    club foot correction      gall blader removed      GASTRIC BYPASS      gastric sleeve      HERNIA REPAIR      HYSTERECTOMY      partial    meniscus repair surgery      repaired ac1      under arm graphs        See the Assessment/Plan for further characterization of the HPI, ROS, Medical, Surgical, Family, and Social Histories including Tobacco use, Meds; all of which has been reviewed in Epic.    Medication List with Changes/Refills   New Medications    SULFASALAZINE (AZULFIDINE) 500 MG EC TABLET    Take 1 tablet (500  "mg total) by mouth once daily.   Current Medications    BUDESONIDE-FORMOTEROL 80-4.5 MCG (SYMBICORT) 80-4.5 MCG/ACTUATION HFAA    Inhale 2 puffs into the lungs. Controller    BUPROPION HCL, SMOKING DETER, (ZYBAN) 150 MG TBSR 12 HR TABLET    Take 1 tablet (150 mg total) by mouth every 12 (twelve) hours.    BUSPIRONE (BUSPAR) 15 MG TABLET    Pt is to take 1/3 (5 mg) or 1/2 (7.5mgs) or one po HS PRN    CALCIUM CARBONATE (OS-MICHELLE) 500 MG CALCIUM (1,250 MG) CHEWABLE TABLET    Take 1 tablet by mouth once daily.    CYANOCOBALAMIN 1,000 MCG/ML INJECTION    Inject 1,000 mcg as directed.    ENTRESTO 49-51 MG PER TABLET    Take 1 tablet by mouth 2 (two) times daily.    ESTRADIOL 0.05 MG/24 HR TD PTSW (PAWAN) 0.05 MG/24 HR    APPLY 1 PATCH TOPICALLY TWICE A WEEK    GABAPENTIN (NEURONTIN) 300 MG CAPSULE    Take 1 capsule (300 mg total) by mouth 3 (three) times daily.    IBUPROFEN (ADVIL,MOTRIN) 800 MG TABLET    Take 1 tablet (800 mg total) by mouth every 6 (six) hours as needed for Pain.    METOPROLOL SUCCINATE (TOPROL-XL) 50 MG 24 HR TABLET    Take 50 mg by mouth.    MULTIVITAMIN CAPSULE    Take 1 capsule by mouth once daily.    MUPIROCIN (BACTROBAN) 2 % OINTMENT    3 (three) times daily. Apply to affected area    PANTOPRAZOLE (PROTONIX) 40 MG TABLET    Take 1 tablet (40 mg total) by mouth once daily.    SURE COMFORT INSULIN SYRINGE 1 ML 29 GAUGE X 1/2" SYRG       Changed and/or Refilled Medications    Modified Medication Previous Medication    TOFACITINIB (XELJANZ XR) 11 MG TB24 tofacitinib (XELJANZ XR) 11 mg Tb24       Take 1 tablet by mouth once daily.    Take 1 tablet by mouth once daily.         Objective:   /81   Pulse 82   Resp 16   Ht 5' 7" (1.702 m)   Wt (!) 150.6 kg (332 lb)   SpO2 98%   BMI 52.00 kg/m²   Physical Exam  Non-toxic appearance. No distress.   Normocephalic and atraumatic.   External ears normal.    Conjunctivae and EOM are normal. No scleral icterus.   RRR, No friction rub; palpable distal " pulses.    No tachypnea or signs of respiratory distress.   Abdominal: No guarding or rebound tenderness.   Neurological: Oriented. Normal thought content. No cranial nerve deficit. Strength is grossly normal without focal deficit.  Skin is warm. No pallor.   Musculoskeletal: DJD see below for further input. No overt synovitis.     [facility] UNKNOWN RAD EAP                                RADIOLOGY REPORT        PT NAME: CAMI SANDERS [facility]     : 1975 F 47             [address]    ACCT: [id number]                                              [facility] Rec #: [id number]                                        [id number]    Patient Location: [place].RAD/             Procedure: HAND LIMITED 2V    REQUISITION #: [id number]      REPORT #: [id number]           DATE OF EXAM: 22    TIME OF EXAM: 09       PROCEDURE: HAND LIMITED 2V Right        CLINICAL DATA:    Unspecified joint pain. Polyarthralgia        TECHNIQUE:    Views: 3 views of the right hand    Limitations: The images appear technically satisfactory.        COMPARISON:    No prior relevant studies are available at this time.        FINDINGS:    Osseous structures: Normal.        Joints: Normal.        Soft tissues:    1. There is a small radiopaque foreign body in the distal aspect of the   second digit. This has the appearance of a broken off sewing needle.        Additional findings: None seen.        IMPRESSION:        1.  Small radiopaque foreign body in the second digit.            This document was created using a voice recognition transcribing system.   Incorrect words or phrases may have been missed during proof reading. Please   interpret accordingly or contact the radiologist for clarification if   necessary.        DICTATING PHYSICIAN:  JOVI CARRANZA MD                   Date Dictated: 22 1045        Signed By:  JOVI CARRANZA MD <Electronically signed by JOVI CARRANZA MD in OV>    Date  Signed:  22 1046     CC: STACIE BERRY MD ; STACIE BERRY MD      ADMITTING PHYSICIAN:                                                                                                    ORDERING PHY: STACIE BERRY MD                                                                                                                                                      ATTENDING PHY: STACIE BERRY MD    Patient Status:  REG CLI    Admit Service Date: 22       X-Ray Hips Bilateral 2 View Inc AP Pelvis                                RADIOLOGY REPORT        PT NAME: CAMI SANDERS [facility]     : 1975 F 47             [address]    ACCT: [id number]                                              [facility] Rec #: [id number]                                        [id number]    Patient Location: [place].RAD/             Procedure: HIPS INDIRA MIN 2 V W PELVIS    REQUISITION #: [id number]      REPORT #: [id number]           DATE OF EXAM: 22    TIME OF EXAM: 904       PROCEDURE: HIPS INDIRA MIN 2 V W PELVIS        CLINICAL DATA:    Joint pain        TECHNIQUE:    Views: A single view the pelvis and frog-leg views of both hips    Limitations: The images appear technically satisfactory.        COMPARISON:    No prior relevant studies are available at this time.        FINDINGS:    Osseous structures: Normal.        Joints: Normal.        Soft tissues: Normal.        Additional findings: None seen.        IMPRESSION:        1.  Negative examination.            This document was created using a voice recognition transcribing system.   Incorrect words or phrases may have been missed during proof reading. Please   interpret accordingly or contact the radiologist for clarification if   necessary.        DICTATING PHYSICIAN:  JOVI CARRANZA MD                   Date Dictated: 22 1019        Signed By:  JOVI CARRANZA MD <Electronically signed by JOVI CARRANZA MD in  OV>    Date Signed:  22 1020     CC: STACIE BERRY MD ; STACIE BERRY MD      ADMITTING PHYSICIAN:                                                                                                    ORDERING PHY: STACIE BERRY MD                                                                                                                                                      ATTENDING PHY: STACIE BERRY MD    Patient Status:  REG CLI    Admit Service Date: 22       [facility] UNKNOWN RAD EAP                                RADIOLOGY REPORT        PT NAME: CAMI SANDERS [facility]     : 1975 F 47             [address]    ACCT: [id number]                                              [facility] Rec #: [id number]                                        [id number]    Patient Location: [place].RAD/             Procedure: HAND LIMITED 2V    REQUISITION #: [id number]      REPORT #: [id number]           DATE OF EXAM: 22    TIME OF EXAM: 904       PROCEDURE: HAND LIMITED 2V Left        CLINICAL DATA:    Joint pain        TECHNIQUE:    Views: 3 views of the left hand    Limitations: The images appear technically satisfactory.        COMPARISON:    No prior relevant studies are available at this time.        FINDINGS:    Osseous structures: Normal.        Joints: Normal.        Soft tissues: Normal.        Additional findings: None seen.        IMPRESSION:        1.  Negative examination.            This document was created using a voice recognition transcribing system.   Incorrect words or phrases may have been missed during proof reading. Please   interpret accordingly or contact the radiologist for clarification if   necessary.        DICTATING PHYSICIAN:  JOVI CARRANZA MD                   Date Dictated: 22 1018        Signed By:  JOVI CARRANZA MD <Electronically signed by JOVI CARRANZA MD in OV>    Date Signed:  22 1019     CC: STACIE BERRY  TAMIR ROMO ; STACIE BERRY MD      ADMITTING PHYSICIAN:                                                                                                    ORDERING PHY: STACIE BERRY MD                                                                                                                                                      ATTENDING PHY: STACIE BERRY MD    Patient Status:  REG CLI    Admit Service Date: 22       [facility] UNKNOWN RAD EAP                                RADIOLOGY REPORT        PT NAME: CAMI SANDERS [facility]     : 1975 F 47             [address]    ACCT: [id number]                                              [facility] Rec #: [id number]                                        [id number]    Patient Location: [place]RAD/             Procedure: FOOT LIMITED 2 VIEWS    REQUISITION #: [id number]      REPORT #: [id number]           DATE OF EXAM: 22    TIME OF EXAM: 09       PROCEDURE: FOOT LIMITED 2 VIEWS Right        CLINICAL DATA:    Joint pain        TECHNIQUE:    Views: 2 views of the right foot    Limitations: The images appear technically satisfactory.        COMPARISON:    No prior relevant studies are available at this time.        FINDINGS:    Osseous structures: Normal.        Joints:    1. There is flattening of the normal arch of the foot. Early osteoarthritic    changes appear present in the hindfoot between the talus and navicular bone    predominantly.        Soft tissues: Normal.        Additional findings: None seen.        IMPRESSION:        1.  Flattening of the foot with osteoarthritic changes in the hindfoot.            This document was created using a voice recognition transcribing system.   Incorrect words or phrases may have been missed during proof reading. Please   interpret accordingly or contact the radiologist for clarification if   necessary.        DICTATING PHYSICIAN:  JOVI CARRANZA MD                   Date  Dictated: 22 1015        Signed By:  JOVI CARRANZA MD <Electronically signed by JOVI CARRANZA MD in OV>    Date Signed:  22 1016     CC: STACIE BERRY MD ; STACIE BERRY MD      ADMITTING PHYSICIAN:                                                                                                    ORDERING PHY: STACIE BERRY MD                                                                                                                                                      ATTENDING PHY: STACIE BERRY MD    Patient Status:  REG CLI    Admit Service Date: 22       [facility] UNKNOWN RAD EAP                                RADIOLOGY REPORT        PT NAME: CAMI SANDERS      [facility]     : 1975 F 47             [address]    ACCT: [id number]                                              [facility] Rec #: [id number]                                        [id number]    Patient Location: [place].RAD/             Procedure: FOOT LIMITED 2 VIEWS    REQUISITION #: [id number]      REPORT #: [id number]           DATE OF EXAM: 22    TIME OF EXAM: 0904       PROCEDURE: FOOT LIMITED 2 VIEWS Left        CLINICAL DATA:    Joint pain        TECHNIQUE:    Views: 3 views of left foot    Limitations: The images appear technically satisfactory.        COMPARISON:    No prior relevant studies are available at this time.        FINDINGS:    Osseous structures: Normal.        Joints:    1. Osteoarthritic changes are noted in the hindfoot involving the [place]   joints with loss of joint space and marginal osteophyte formation.        Soft tissues: Normal.        Additional findings: None seen.        IMPRESSION:        1.  Hindfoot osteoarthritis.            This document was created using a voice recognition transcribing system.   Incorrect words or phrases may have been missed during proof reading. Please   interpret accordingly or contact the radiologist for  clarification if   necessary.        DICTATING PHYSICIAN:  JOVI CARRANZA MD                   Date Dictated: 22 1012        Signed By:  JOVI CARRANZA MD <Electronically signed by JOVI CARRANZA MD in OV>    Date Signed:  22 1015     CC: STACIE BERRY MD ; STACIE BERRY MD      ADMITTING PHYSICIAN:                                                                                                    ORDERING PHY: STACIE BERRY MD                                                                                                                                                      ATTENDING PHY: STACIE BERRY MD    Patient Status:  REG CLI    Admit Service Date: 22       [facility] UNKNOWN RAD EAP                                RADIOLOGY REPORT        PT NAME: CAMI SANDERS      [facility]     : 1975 F 47             [address]    ACCT: [id number]                                              [facility] Rec #: [id number]                                        [id number]    Patient Location: [place].RAD/             Procedure: KNEE LIMITED 1 or 2 VIEWS    REQUISITION #: [id number]      REPORT #: [id number]           DATE OF EXAM: 22    TIME OF EXAM: 0904       PROCEDURE: KNEE LIMITED 1 or 2 VIEWS Right        CLINICAL DATA:    Joint pain        TECHNIQUE:    Views: 2 views of the right knee    Limitations: The images appear technically satisfactory.        COMPARISON:    No prior relevant studies are available at this time.        FINDINGS:    Osseous structures: Normal.        Joints:    1. There is loss of joint space in all 3 compartments with irregularity the    articular surface and marginal osteophyte formation off of all 3 bony   elements.        Soft tissues: Normal.        Additional findings: None seen.        IMPRESSION:        1.  Osteoarthritis.            This document was created using a voice recognition transcribing system.   Incorrect words  or phrases may have been missed during proof reading. Please   interpret accordingly or contact the radiologist for clarification if   necessary.        DICTATING PHYSICIAN:  JOVI CARRANZA MD                   Date Dictated: 22 1007        Signed By:  JOVI CARRANZA MD <Electronically signed by JOVI CARRANZA MD in OV>    Date Signed:  22 1008     CC: STACIE BERRY MD ; STACIE BERRY MD      ADMITTING PHYSICIAN:                                                                                                    ORDERING PHY: STACIE BERRY MD                                                                                                                                                      ATTENDING PHY: STACIE BERRY MD    Patient Status:  REG CLI    Admit Service Date: 22       [facility] UNKNOWN RAD EAP                                RADIOLOGY REPORT        PT NAME: CAMI SANDERS      [facility]     : 1975 F 47             [address]    ACCT: [id number]                                              [facility] Rec #: [id number]                                        [id number]    Patient Location: [place].RAD/             Procedure: KNEE LIMITED 1 or 2 VIEWS    REQUISITION #: [id number]      REPORT #: [id number]           DATE OF EXAM: 22    TIME OF EXAM: 09       PROCEDURE: KNEE LIMITED 1 or 2 VIEWS Left        CLINICAL DATA:    Joint pain        TECHNIQUE:    Views: 2 views a left knee    Limitations: The images appear technically satisfactory.        COMPARISON:    No prior relevant studies are available at this time.        FINDINGS:    Osseous structures: Normal.        Joints:    1. There is loss of joint space in all 3 compartments most notably in the   lateral compartment. There is irregularity the articular surface and   subchondral sclerosis. Marginal osteophyte formation is seen off of all 3   bony elements.        Soft tissues: Normal.         Additional findings: None seen.        IMPRESSION:        1.  Osteoarthritis.            This document was created using a voice recognition transcribing system.   Incorrect words or phrases may have been missed during proof reading. Please   interpret accordingly or contact the radiologist for clarification if   necessary.        DICTATING PHYSICIAN:  JOVI CARRANZA MD                   Date Dictated: 22 1007        Signed By:  JOVI CARRANZA MD <Electronically signed by JOVI CARRANZA MD in OV>    Date Signed:  22     CC: STACIE BERRY MD ; STACIE BERRY MD      ADMITTING PHYSICIAN:                                                                                                    ORDERING PHY: STACIE BERRY MD                                                                                                                                                      ATTENDING PHY: STACIE BERRY MD    Patient Status:  REG CLI    Admit Service Date: 22       X-Ray Lumbar Spine 2 Or 3 Views                                RADIOLOGY REPORT        PT NAME: CAMI SANDERS      [facility]     : 1975 F 47             [address]    ACCT: [id number]                                              [facility] Rec #: [id number]                                        [id number]    Patient Location: [place].RAD/             Procedure: SPINE LUMBSAC LM 2V OR 3V    REQUISITION #: [id number]      REPORT #: [id number]           DATE OF EXAM: 22    TIME OF EXAM: 0904       CLINICAL HISTORY:    Joint pain        TECHNIQUE:    Views: 3 views of the lumbar spine    Limitations: No technical limitations.        COMPARISON:    No prior relevant studies.        FINDINGS:    Alignment:Normal.        Vertebral bodies and posterior elements:    1. Anterior osteophytic spicules seen off the vertebrae from L3 to S1.        Disc spaces: Normal.        Soft tissues: Normal.         Additional findings: None seen.        IMPRESSION:        1.  Mild degenerative changes.            This document was created using a voice recognition transcribing system.   Incorrect words or phrases may have been missed during proof reading. Please   interpret accordingly or contact the radiologist for clarification if   necessary.        DICTATING PHYSICIAN:  JOVI CARRANZA MD                   Date Dictated: 22        Signed By:  JOVI CARRANZA MD <Electronically signed by JOVI CARRANZA MD in OV>    Date Signed:  22     CC: STACIE BERRY MD ; STACIE BERRY MD      ADMITTING PHYSICIAN:                                                                                                    ORDERING PHY: STACIE BERRY MD                                                                                                                                                      ATTENDING PHY: STACIE BERRY MD    Patient Status:  REG CLI    Admit Service Date: 22       X-Ray Cervical Spine 2 or 3 Views                                RADIOLOGY REPORT        PT NAME: CAMI SANDERS      [facility]     : 1975 F 47             [address]    ACCT: [id number]                                              [facility] Rec #: [id number]                                        [id number]    Patient Location: [place].RAD/             Procedure: SPINE CERV 2V or 3V    REQUISITION #: [id number]      REPORT #: [id number]           DATE OF EXAM: 22    TIME OF EXAM: 0904       CLINICAL DATA:    Neck pain.        TECHNIQUE:    Views: 3 views of the cervical spine were obtained.    Limitations: The images are technically satisfactory.        COMPARISON:    2013        FINDINGS:    Alignment: Normal.        Vertebrae and posterior elements:    1. Anterior osteophytic spicules seen off the vertebrae from C5 to C7.        Disc spaces: Normal.         Craniocervical junction: Normal.        C1 and C2: Normal.        Soft tissues: Normal.        Additional findings: None seen.        IMPRESSION:        1.  Mild degenerative changes.            This document was created using a voice recognition transcribing system.   Incorrect words or phrases may have been missed during proof reading. Please   interpret accordingly or contact the radiologist for clarification if   necessary.        DICTATING PHYSICIAN:  JOVI CARRANZA MD                   Date Dictated: 22        Signed By:  JOVI CARRANZA MD <Electronically signed by JOVI CARRANZA MD in OV>    Date Signed:  22     CC: STACIE BERRY MD ; STACIE BERRY MD      ADMITTING PHYSICIAN:                                                                                                    ORDERING PHY: STACIE BERRY MD                                                                                                                                                      ATTENDING PHY: STACIE BERRY MD    Patient Status:  REG CLI    Admit Service Date: 22       X-Ray Chest PA And Lateral                                RADIOLOGY REPORT        PT NAME: CAMI SANDERS      [facility]     : 1975 F 47             [address]    ACCT: [id number]                                              [facility] Rec #: [id number]                                        [id number]    Patient Location: [place].RAD/             Procedure: CHEST 2 VIEWS    REQUISITION #: [id number]      REPORT #: [id number]           DATE OF EXAM: 22    TIME OF EXAM: 0904           PROCEDURE: CHEST 2 VIEWS    CLINICAL DATA:    Joint pain        TECHNIQUE:    Views:  PA and lateral views of the chest.    Limitations: The images are technically satisfactory.        COMPARISON:    2021        FINDINGS:    Cardiovascular: Normal.        Lungs and pleura: Normal.        Mediastinal  and hilar structures: Normal.        Osseous structures: Normal.        Additional findings: None seen.        IMPRESSION:    1.  Negative examination.            This document was created using a voice recognition transcribing system.   Incorrect words or phrases may have been missed during proof reading. Please   interpret accordingly or contact the radiologist for clarification if   necessary.        DICTATING PHYSICIAN:  JOVI CARRANZA MD                   Date Dictated: 07/29/22 0954        Signed By:  JOVI CARRANZA MD <Electronically signed by JOVI CARRANZA MD in OV>    Date Signed:  07/29/22 0954     CC: STACIE BERRY MD ; STACIE BERRY MD      ADMITTING PHYSICIAN:                                                                                                    ORDERING PHY: STACIE BERRY MD                                                                                                                                                      ATTENDING PHY: STACIE BERRY MD    Patient Status:  REG CLI    Admit Service Date: 07/29/22         Orders Only on 07/29/2022   Component Date Value Ref Range Status    QuantiFERON-TB Gold Plus 07/29/2022 NEGATIVE  Negative Final    Comment: Interpretive Data: Quantiferon TB Gold PlusInterferon gamma release is measured for specimens from each ofthe four collection tubes. A qualitative result (Negative,Positive, or Indeterminate) is based on interpretation of thefour values, NIL, MITOGEN   minus NIL (MITOGEN-NIL), TB1 minus NIL(TB1-NIL), and TB2 minus NIL (TB2-NIL). The NIL value representsnonspecific reactivity produced by the patient specimen. TheMITOGEN-NIL value serves as the positive control for the patientspecimen, demonstrating   successful lymphocyte activity. TheTB1-NIL tube specifically detects CD4+ lymphocyte reactivity,specifically stimulated by the TB1 antigens. The TB2-NIL tubedetects both CD4+ and CD8+ lymphocyte reactivity, stimulated byTB2  antigens. An overall Negative   result does not completelyrule out TB infection.A false-positive result in the absence of other clinicalevidence of TB infection is not uncommon. Refer to: UpdatedGuidelines for Using Interferon Gamma Release Assays to DetectMycobacterium                            tuberculosis   Infection --- [place], 2010(http://www.cdc.gov/mmwr/preview/mmwrhtml/ad8853v8.htm), formore information concerning test performance in low-prevalencepopulations and use in occupational screening.      QuantiFERON TB1 NIL 07/29/2022 0.00  0.00 - 0.34 IU/mL Final    QuantiFERON TB2 NIL 07/29/2022 0.00  0.00 - 0.34 IU/mL Final    QuantiFERON Mitogen Minus NIL 07/29/2022 >10.00  IU/mL Final    QuantiFERON TB NIL 07/29/2022 0.02  IU/mL Final    Performed By: [place] [id number] Director: Mario Saab MD, PhD   Orders Only on 07/29/2022   Component Date Value Ref Range Status    Hepatitis B Core Ab Total 07/29/2022 NEGATIVE  Negative Final    Comment: INTERPRETIVE INFORMATION: Hepatitis B Core Ab (Total)This assay should not be used for blood donor screening,associated re-entry protocols, or for screening Human Cells,Tissues and Cellular and Tissue-Based Products (HCT/P).Performed By: [facility]   [id number] [place] [address] [id number] Director: Mario Saab MD, PHD      C3 Complement 07/29/2022 113  90 - 180 mg/dL Final    Comment: REFERENCE INTERVAL: Complement Component 3Access complete set of age- and/or gender-specific referenceintervals for this test in the [facility] Laboratory Test Directory([email or web address]).Performed By: [facility] [address] [place] [address]   [id number] Director: Mario Saab MD, PhD      C4 Complement 07/29/2022 21  10 - 40 mg/dL Final    Comment: REFERENCE INTERVAL: Complement Component 4Access complete set of age- and/or gender-specific referenceintervals for this test in the [facility]  Laboratory Test Directory([email or web address]).Performed By: [facility] [place] [address]   [id number] Director: Mario Saab MD, PhD      AMBROCIO 07/29/2022 NONE DETECTED  None Detected Final    Comment: If suspicion of connective tissue disease is strong and AMBROCIO EIAis negative, consider testing for AMBROCIO by IFA ([id number]).No antibodies to Anti-Nuclear Antibodies (AMBROCIO) detected.  TheExtractable Nuclear Antigen Antibodies (RNP, Perales, SSA 52, SSA60,   Scleroderma, Sharona-1 and SSB) and Double Stranded DNA (dsDNA)Antibody, IgG will not be performed.INTERPRETIVE INFORMATION: Anti-Nuclear Antibodies (AMBROCIO), IgG byELISAAntinuclear Antibodies (AMBROCIO), IgG by ROEL: AMBROCIO specimens arescreened using enzyme-linked   immunosorbent assay (ROEL)methodology. All ROEL results reported as Detected are furthertested by indirect fluorescent assay (IFA) using HEp-2 substratewith an IgG-specific conjugate. The AMBROCIO ROEL screen is designedto detect antibodies against dsDNA,   histones, SS-A (Ro), SS-B(La), Perales, Smith/RNP, Scl-70, Sharona-1, centromeric proteins,other antigens extracted from the HEp-2 cell nucleus. AMBROCIO ELISAassays have been reported to have lower sensitivities than ANAIFA for systemic autoimmune rheum                           atic   diseases (SARD).Negative results do not necessarily rule out SARD.Performed By: [facility] [place] [address] [id number] Director: Mario Saab MD, PhD      Rheumatoid Factor 07/29/2022 <10  0 - 14 IU/mL Final    Performed By: [facility] [place] [address] [id number] Director: Mario Saab MD, PhD    Cyclic Citrullinated Peptide (CCP)* 07/29/2022 2  0 - 19 Units Final    Comment: INTERPRETIVE INFORMATION: Cyclic Citrullinated Peptide Antibody,IgG  19 Units or less ................... Negative  20-39 Units ........................ Weak Positive  40-59 Units ........................ Moderate Positive  60 Units or  greater   ................ Strong PositiveAnti-cyclic citrullinated peptide (anti-CCP), IgG antibodies arepresent in about 69-83 percent of patients with rheumatoidarthritis (RA) and have specificities of 93-95 percent. Theseautoantibodies may be present in the   preclinical phase ofdisease, are associated with future RA development, and maypredict radiographic joint destruction. Patients with weakpositive results should be monitored and testing repeated.Performed By: [facility] [place] [address] [id number] Director: Mario Saab MD, PhD      Total Protein, Electrophoresis 07/29/2022 6.8  6.3 - 8.2 g/dL Final    Albumin, Electrophoresis 07/29/2022 3.61 (A) 3.75 - 5.01 g/dL Final    Alpha-1-Globulin 07/29/2022 0.29  0.19 - 0.46 g/dL Final    Alpha-2-Globulin 07/29/2022 0.65  0.48 - 1.05 g/dL Final    Beta Globulin 07/29/2022 0.83  0.48 - 1.10 g/dL Final    Gamma Globulin 07/29/2022 1.42  0.62 - 1.51 g/dL Final    Immunofix Interp. 07/29/2022 SEE NOTE   Final    Normal SPEP pattern.  Immunofixation electrophoresis (LUCHO)is a more sensitive technique for the identification ofsmall M-proteins.    EER Protein Electrophoresis 07/29/2022 SEE NOTE   Final    Authorized individuals can access the [facility] Report using the following link:[email or web address] By: [facility] [place] [address] [id number] Director: Mario Saab MD, PhD   Orders Only on 07/29/2022   Component Date Value Ref Range Status    Hep B S Ab 07/29/2022 Nonreactive  Nonreactive Final    Hepatitis B Surface Ag 07/29/2022 Nonreactive  Nonreactive Final    Hepatitis C Ab 07/29/2022 Nonreactive  Nonreactive Final   Orders Only on 07/29/2022   Component Date Value Ref Range Status    WBC 07/29/2022 6.2  4.6 - 10.2 10*3/uL Final    RBC 07/29/2022 3.96 (A) 4.2 - 5.4 10*6/uL Final    Hemoglobin 07/29/2022 12.3  12.0 - 16.0 g/dL Final     Hematocrit 07/29/2022 37.9  37.0 - 47.0 % Final    MCV 07/29/2022 95.7  80 - 97 fL Final    MCH 07/29/2022 31.1  27.0 - 31.2 pg Final    MCHC 07/29/2022 32.5  31.8 - 35.4 g/dL Final    RDW RBC Auto-Rto 07/29/2022 13.6  12.5 - 18.0 % Final    Platelets 07/29/2022 274  142 - 424 10*3/uL Final    Neutrophils 07/29/2022 54.5  37 - 80 % Final    Lymphocytes 07/29/2022 36.4  25 - 40 % Final    Monocytes 07/29/2022 6.2  1 - 15 % Final    Eosinophils 07/29/2022 2.3  1 - 3 % Final    Basophils 07/29/2022 0.3  0 - 3 % Final    nRBC# 07/29/2022 0.0  % Final    Neutrophils Absolute 07/29/2022 3.35  1.8 - 7.7 10*3/uL Final    Sed Rate 07/29/2022 21 (A) 0 - 20 mm/hr Final   Orders Only on 07/29/2022   Component Date Value Ref Range Status    Sodium 07/29/2022 140  135 - 145 mmol/L Final    Potassium 07/29/2022 4.2  3.6 - 5.2 mmol/L Final    Chloride 07/29/2022 107  100 - 108 mmol/L Final    CO2 07/29/2022 29  21 - 32 mmol/L Final    Anion Gap 07/29/2022 4.0  3.0 - 11.0 mmol/L Final    BUN 07/29/2022 19 (A) 7 - 18 mg/dL Final    Creatinine 07/29/2022 0.89  0.55 - 1.02 mg/dL Final    GFR ESTIMATION 07/29/2022 > 60  >60 Final               DESCRIPTION       GLOMERULAR FILTRATION RATE             NORMAL                     >60             KIDNEY  DISEASE           15-60             KIDNEY FAILURE             <15    BUN/Creatinine Ratio 07/29/2022 21.34 (A) 7 - 18 Ratio Final    Glucose 07/29/2022 93  70 - 110 mg/dL Final    Calcium 07/29/2022 8.9  8.8 - 10.5 mg/dL Final    Total Bilirubin 07/29/2022 0.6  0.0 - 1.0 mg/dL Final    AST 07/29/2022 20  15 - 37 U/L Final    ALT 07/29/2022 16  12 - 78 U/L Final    Total Protein 07/29/2022 7.3  6.4 - 8.2 g/dL Final    Albumin 07/29/2022 3.5  3.4 - 5.0 g/dL Final    Globulin 07/29/2022 3.8 (A) 2.3 - 3.5 g/dL Final    Albumin/Globulin Ratio 07/29/2022 0.921 (A) 1.1 - 1.8 Ratio Final    Alkaline Phosphatase 07/29/2022 71  46 - 116 U/L Final    Uric Acid  07/29/2022 5.6  2.6 - 6.0 mg/dL Final    CK, Serum 07/29/2022 64  26 - 308 U/L Final    CRP QUANTITATIVE 07/29/2022 0.3  0.0 - 0.9 mg/dL Final   Orders Only on 07/29/2022   Component Date Value Ref Range Status    Specimen Collection Method 07/29/2022 Void   Final    Color, UA 07/29/2022 Yellow  Yellow Final    Appearance, UA 07/29/2022 Very Cloudy (A) Clear Final    pH, UA 07/29/2022 6.0  5.0 - 9.0 pH Final    Specific Gravity, UA 07/29/2022 1.020  1.000 - 1.030 Final    Protein, UA 07/29/2022 Trace (A) Negative mg/dL Final    Glucose, UA 07/29/2022 Normal  Normal mg/dL Final    Ketones, UA 07/29/2022 Negative  Negative mg/dL Final    Occult Blood UA 07/29/2022 25 (A) Negative /uL Final    Nitrite, UA 07/29/2022 Negative  Negative Final    Bilirubin (UA) 07/29/2022 Negative  Negative mg/dL Final    Urobilinogen, UA 07/29/2022 Normal  Normal mg/dL Final    Leukocytes, UA 07/29/2022 Negative  Negative /uL Final    RBC, UA 07/29/2022 0-2  0 - 2 /HPF Final    WBC, UA 07/29/2022 None  0 - 5 /HPF Final    Squam Epithel, UA 07/29/2022 2+ Moderate  /LPF Final    Bacteria 07/29/2022 1+ Few  Few/HPF /HPF Final    Service Comment 03 07/29/2022 No, Criteria Not Met   Final   Office Visit on 02/11/2022   Component Date Value Ref Range Status    WBC 02/11/2022 6.9  3.8 - 10.8 Thousand/uL Final    RBC 02/11/2022 4.15  3.80 - 5.10 Million/uL Final    Hemoglobin 02/11/2022 12.9  11.7 - 15.5 g/dL Final    Hematocrit 02/11/2022 39.9  35.0 - 45.0 % Final    MCV 02/11/2022 96.1  80.0 - 100.0 fL Final    MCH 02/11/2022 31.1  27.0 - 33.0 pg Final    MCHC 02/11/2022 32.3  32.0 - 36.0 g/dL Final    RDW 02/11/2022 12.1  11.0 - 15.0 % Final    Platelets 02/11/2022 298  140 - 400 Thousand/uL Final    MPV 02/11/2022 10.5  7.5 - 12.5 fL Final    Neutrophils, Abs 02/11/2022 4,388  1,500 - 7,800 cells/uL Final    Lymph # 02/11/2022 1,925  850 - 3,900 cells/uL Final    Mono # 02/11/2022 449  200 - 950 cells/uL  Final    Eos # 02/11/2022 117  15 - 500 cells/uL Final    Baso # 02/11/2022 21  0 - 200 cells/uL Final    Neutrophils Relative 02/11/2022 63.6  % Final    Lymph % 02/11/2022 27.9  % Final    Mono % 02/11/2022 6.5  % Final    Eosinophil % 02/11/2022 1.7  % Final    Basophil % 02/11/2022 0.3  % Final    Glucose 02/11/2022 95  65 - 99 mg/dL Final    Comment:               Fasting reference interval         BUN 02/11/2022 19  7 - 25 mg/dL Final    Creatinine 02/11/2022 0.91  0.50 - 1.10 mg/dL Final    eGFR if non African American 02/11/2022 77  > OR = 60 mL/min/1.73m2 Final    eGFR if  02/11/2022 89  > OR = 60 mL/min/1.73m2 Final    BUN/Creatinine Ratio 02/11/2022 NOT APPLICABLE  6 - 22 (calc) Final    Sodium 02/11/2022 140  135 - 146 mmol/L Final    Potassium 02/11/2022 4.1  3.5 - 5.3 mmol/L Final    Chloride 02/11/2022 105  98 - 110 mmol/L Final    CO2 02/11/2022 29  20 - 32 mmol/L Final    Calcium 02/11/2022 9.5  8.6 - 10.2 mg/dL Final    Total Protein 02/11/2022 7.3  6.1 - 8.1 g/dL Final    Albumin 02/11/2022 4.0  3.6 - 5.1 g/dL Final    Globulin, Total 02/11/2022 3.3  1.9 - 3.7 g/dL (calc) Final    Albumin/Globulin Ratio 02/11/2022 1.2  1.0 - 2.5 (calc) Final    Total Bilirubin 02/11/2022 1.0  0.2 - 1.2 mg/dL Final    Alkaline Phosphatase 02/11/2022 73  31 - 125 U/L Final    AST 02/11/2022 19  10 - 30 U/L Final    ALT 02/11/2022 12  6 - 29 U/L Final    TSH 02/11/2022 1.27  mIU/L Final    Comment:           Reference Range                         > or = 20 Years  0.40-4.50                              Pregnancy Ranges            First trimester    0.26-2.66            Second trimester   0.55-2.73            Third trimester    0.43-2.91      Cholesterol 02/11/2022 155  <200 mg/dL Final    HDL 02/11/2022 52  > OR = 50 mg/dL Final    Triglycerides 02/11/2022 68  <150 mg/dL Final    LDL Cholesterol 02/11/2022 88  mg/dL (calc) Final    Comment: Reference range: <100      Desirable range <100 mg/dL for primary prevention;    <70 mg/dL for patients with CHD or diabetic patients   with > or = 2 CHD risk factors.     LDL-C is now calculated using the Tavia   calculation, which is a validated novel method providing   better accuracy than the Friedewald equation in the   estimation of LDL-C.   Clint ZACARIAS et al. JEFFERY. 2013;310(19): 8889-1917   (http://education.[employer].MenoGeniX/faq/TIO492)      HDL/Cholesterol Ratio 02/11/2022 3.0  <5.0 (calc) Final    Non HDL Chol. (LDL+VLDL) 02/11/2022 103  <130 mg/dL (calc) Final    Comment: For patients with diabetes plus 1 major ASCVD risk   factor, treating to a non-HDL-C goal of <100 mg/dL   (LDL-C of <70 mg/dL) is considered a therapeutic   option.      Vitamin B-12 02/11/2022 >2000 (A) 200 - 1100 pg/mL Final        All of the data above and below has been reviewed by myself and any further interpretations will be reflected in the assessment and plan.   The data includes review of external notes, and independent interpretation of lab results, x-rays, and imaging reports.  Active inflammatory arthritis is an illness that poses a threat to bodily function and even a threat to life in some cases.  Drug therapy to treat inflammatory arthritis usually requires intensive monitoring for toxicity.    Review of patient's allergies indicates:   Allergen Reactions    Adhesive Blisters, Hives, Itching and Swelling    Adhesive tape-silicones      whelp and redness     Assessment/Plan:       Prev noted/prior plan:   (No overt synovitis  Deformities including ankles feet  DJD  Edema in feet lower legs  Hyperpigmentation skin changes noted        Verbal education        Blood work and xrays to further evaluate     We will seek records from Dr. Washington     We will seek records from Dermatology Dr. Samuel and she tells me she is working on getting back with Dermatology      She will consider also working with Orthopedics and Physical Medicine and  Rehab MD if needed     Cont xeljanz 11mg daily     ibuprofen 800mg prn     Revisit weeks      Contact us prn)    This visit:    Interim lab:  Lab Results   Component Value Date     07/29/2022    K 4.2 07/29/2022     07/29/2022    CO2 29 07/29/2022    ANIONGAP 4.0 07/29/2022    GLU 93 07/29/2022    CALCIUM 8.9 07/29/2022    BUN 19 (H) 07/29/2022    CREATININE 0.89 07/29/2022    TSH 1.27 02/11/2022    PROT 7.3 07/29/2022    AST 20 07/29/2022    ALT 16 07/29/2022    BILITOT 0.6 07/29/2022    ALKPHOS 71 07/29/2022    WBC 6.2 07/29/2022    NEUTROPHILS 54.5 07/29/2022    HGB 12.3 07/29/2022    MCV 95.7 07/29/2022     02/11/2022    LABPLAT 274 07/29/2022    SEDRATE 21 (H) 07/29/2022    WBCUA None 07/29/2022    RBCUA 0-2 07/29/2022    BACTERIA 1+ Few 07/29/2022    PROTEINUA Trace (A) 07/29/2022       CPK 64    SPEP normal      AMBROCIO neg RF neg CCP neg HepBsAgAbcoreAbneg HCV neg uric acid 5.6      ESR 21 CRP 0.3      Interim 7/29/22 xray reports:  CXR COMPARISON:  April 6, 2021    FINDINGS:  Cardiovascular: Normal.    Lungs and pleura: Normal.    Mediastinal and hilar structures: Normal.    Osseous structures: Normal.        lumbar spine  Anterior osteophytic spicules seen off the vertebrae from L3 to S1.    Disc spaces: Normal.    Soft tissues: Normal.    Additional findings: None seen.    IMPRESSION:    1.  Mild degenerative changes.          cervical spine COMPARISON:  June 11, 2013    Anterior osteophytic spicules seen off the vertebrae from C5 to C7.   C1 and C2: Normal.    Soft tissues: Normal.    Additional findings: None seen.    IMPRESSION:    1.  Mild degenerative changes.          right hand  small radiopaque foreign body in the distal aspect of the second digit. This has the appearance of a broken off sewing needle.    Additional findings: None seen.    Small radiopaque foreign body in the second digit.          Hips/pelvis normal    right knee  loss of joint space in all 3 compartments with  irregularity the  articular surface and marginal osteophyte formation off of all 3 bony elements.    Soft tissues: Normal.    Additional findings: None seen.    IMPRESSION:    1.  Osteoarthritis.          left knee  loss of joint space in all 3 compartments most notably in the lateral compartment. There is irregularity the articular surface and subchondral sclerosis. Marginal osteophyte formation is seen off of all 3 bony elements.    Soft tissues: Normal.    Additional findings: None seen.    IMPRESSION:    1.  Osteoarthritis.           right foot  flattening of the normal arch of the foot. Early osteoarthritic  changes appear present in the hindfoot between the talus and navicular bone  predominantly.    Soft tissues: Normal.    Additional findings: None seen.    IMPRESSION:    1.  Flattening of the foot with osteoarthritic changes in the hindfoot.          left foot  Osteoarthritic changes are noted in the hindfoot involving the Chopart joints with loss of joint space and marginal osteophyte formation.    Soft tissues: Normal.    Additional findings: None seen.    IMPRESSION:    1.  Hindfoot osteoarthritis.               Interim records from Dr. Washington some of most recent note 12/15/21 Televisit: whole body hurts; xeljanz and prednisone 5mg working well; A/P: Rheumatoid arthritis; hidradinitis suppurativa; was on humira for HS; lab showed AMBROCIO+ dsDNA+ after humira; past meds problem with Methotrexate injection; CHF avoid TNF; does not tolerate plaquenil GI upset; on xeljanz 11mg daily prior left arm fracture; will resume xeljanz 11mg as has been off because of hernia surgery; start prednisone 5mg daily; hidradenitis active discuss with Dermatology; biotene and artificial tears for sicca; lab revisit 3 months       Interim records from Dermatology Dr. Samuel and she tells me she is working on getting back with Dermatology:        Gabapentin 300mg tid    xeljanz 11mg daily     ibuprofen 800mg prn    She has some  interim pain and symptoms of inflammatory arthritis    No overt synovitis  Ankle deformities    Went over lab and xray reports      She tells me she is to see a new Dermatologist Dr. Iraida Perales as no longer seeing Dr. Samuel?    She had seen Podiatry in past per records but she does not know who or where noted      We can add sulfasalazine 500mg daily although I am not seeing overt active inflammatory arthritis    We will refer to Orthopedics for her DJD and the foreign body in right hand    We will also refer to Physical Medicine and Rehab MD for her chronic back pain DJD DDD    We will reseek records from Dr. Samuel    Revisit 3 months with lab 2 weeks prior    Contact us prn        AMBROCIO neg RF neg CCP neg HepBsAgAbcoreAbneg HCV neg uric acid 5.6  B12>2000  Rheumatoid arthritis per limited records     2022 TB negative    Previously working with Dr. Washington     We will seek records from Dr. Washington  records from Dr. Washington some of most recent note 12/15/21 Televisit: whole body hurts; xeljanz and prednisone 5mg working well; A/P: Rheumatoid arthritis; hidradinitis suppurativa; was on humira for HS; lab showed AMBROCIO+ dsDNA+ after humira; past meds problem with Methotrexate injection; CHF avoid TNF; does not tolerate plaquenil GI upset; on xeljanz 11mg daily prior left arm fracture; will resume xeljanz 11mg as has been off because of hernia surgery; start prednisone 5mg daily; hidradenitis active discuss with Dermatology; biotene and artificial tears for sicca; lab revisit 3 months      There is also some mechanical and neurogenic pain.     7/29/22 xray reports:  CXR COMPARISON:  April 6, 2021    FINDINGS:  Cardiovascular: Normal.    Lungs and pleura: Normal.    Mediastinal and hilar structures: Normal.    Osseous structures: Normal.        lumbar spine  Anterior osteophytic spicules seen off the vertebrae from L3 to S1.    Disc spaces: Normal.    Soft tissues: Normal.    Additional findings: None seen.    IMPRESSION:     1.  Mild degenerative changes.          cervical spine COMPARISON:  June 11, 2013    Anterior osteophytic spicules seen off the vertebrae from C5 to C7.   C1 and C2: Normal.    Soft tissues: Normal.    Additional findings: None seen.    IMPRESSION:    1.  Mild degenerative changes.          right hand  small radiopaque foreign body in the distal aspect of the second digit. This has the appearance of a broken off sewing needle.    Additional findings: None seen.    Small radiopaque foreign body in the second digit.          Hips/pelvis normal    right knee  loss of joint space in all 3 compartments with irregularity the  articular surface and marginal osteophyte formation off of all 3 bony elements.    Soft tissues: Normal.    Additional findings: None seen.    IMPRESSION:    1.  Osteoarthritis.          left knee  loss of joint space in all 3 compartments most notably in the lateral compartment. There is irregularity the articular surface and subchondral sclerosis. Marginal osteophyte formation is seen off of all 3 bony elements.    Soft tissues: Normal.    Additional findings: None seen.    IMPRESSION:    1.  Osteoarthritis.           right foot  flattening of the normal arch of the foot. Early osteoarthritic  changes appear present in the hindfoot between the talus and navicular bone  predominantly.    Soft tissues: Normal.    Additional findings: None seen.    IMPRESSION:    1.  Flattening of the foot with osteoarthritic changes in the hindfoot.          left foot  Osteoarthritic changes are noted in the hindfoot involving the Chopart joints with loss of joint space and marginal osteophyte formation.    Soft tissues: Normal.    Additional findings: None seen.    IMPRESSION:    1.  Hindfoot osteoarthritis.           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 Samuel for Hidradenitis then had skin reaction hives thought from humira noted     Prior problem with Methotrexate nausea     Prior Prednisone 5mg     ibuprofen 800mg prn     Neurontin 300mg bid     CaD     She tells me last week she could not walk and often needs a walker noted     She does not recall ever getting xrays for her joints     She has not seen Dr. Washington in years and PCP has been giving xeljanz and intermittent steroids prednisone    Prev noted/prior plan:   (No overt synovitis  Deformities including ankles feet  DJD  Edema in feet lower legs  Hyperpigmentation skin changes noted        Verbal education        Blood work and xrays to further evaluate     We will seek records from Dr. Washington     We will seek records from Dermatology Dr. Samuel and she tells me she is working on getting back with Dermatology      She will consider also working with Orthopedics and Physical Medicine and Rehab MD if needed     Cont xeljanz 11mg daily     ibuprofen 800mg prn     Revisit weeks      Contact us prn)        Has been working with:     DM insulin     CHF followed by Cardiology     Hidradenitis per records      Also with history of rashes and hives     We will seek records from Dermatology and she tells me she is working on getting back with Dermatology      Smoking cessation     We will seek records.     Records PCP Dr. Puckett some of recent note 2/11/22:     (( Establish Care (Pt had 2 falls that broke her arm), Referral (Pt need referral for a dietician and also a referral to a mental health facility. ), and Dizziness (Pt stated the dizziness until pt had covid)  HPI   Was seeing Dr Washington and states she has had a bunch of lab work but no appointment. She has been on methotrexate in the past but it made her sick     Patient states she had a pneumonia and influenza  vaccine in 2018 and she had a severe reaction. She states she was waiting on Dr Washington to let her know if she is safe to get the covid vaccine. She states she has had it twice now     Her podatrist is Dr Cryer. 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 A brace has been ordered. She also has polyneuropathy which was discovered by a neurologist       Dr Bae is her Cardiologist     Neurologist is Dr Bai             Past Medical History:   Diagnosis Date    Anemia      Anxiety      Arthritis      CHF (congestive heart failure)      Depression      Hidradenitis suppurativa      Hypertension      Polyneuropathy                  Past Surgical History:   Procedure Laterality Date    club foot correction        gall blader removed        GASTRIC BYPASS        gastric sleeve        HERNIA REPAIR        HYSTERECTOMY         partial    meniscus repair surgery        repaired ac1        under arm graphs          Assessment:          ICD-10-CM ICD-9-CM   1. Polyarthritis  M13.0 716.50   2. Obesity, morbid  E66.01 278.01   3. Encounter for screening for malignant neoplasm of breast, unspecified screening modality  Z12.39 V76.10   4. Encounter for screening mammogram for malignant neoplasm of breast   Z12.31 V76.12   5. Hypertension, unspecified type  I10 401.9   6. H/O bariatric surgery  Z98.84 V45.86   7. Low vitamin B12 level  E53.8 266.2       Plan:      Polyarthritis  -     Ambulatory referral/consult to Rheumatology; Future; Expected date: 02/18/2022  -     CBC Auto Differential; Future; Expected date: 02/11/2022  -     Comprehensive Metabolic Panel; Future; Expected date: 02/11/2022  -     TSH; Future; Expected date: 02/11/2022  -     Lipid Panel; Future; Expected date: 02/11/2022  -     Vitamin B12; Future; Expected date: 02/11/2022     Obesity, morbid     Encounter for screening for malignant neoplasm of breast, unspecified screening modality  -     Mammo Digital Screening Bilat;  Future; Expected date: 02/11/2022     Encounter for screening mammogram for malignant neoplasm of breast   -     Mammo Digital Screening Bilat; Future; Expected date: 02/11/2022     Hypertension, unspecified type  -     CBC Auto Differential; Future; Expected date: 02/11/2022  -     Comprehensive Metabolic Panel; Future; Expected date: 02/11/2022  -     TSH; Future; Expected date: 02/11/2022  -     Lipid Panel; Future; Expected date: 02/11/2022  -     Vitamin B12; Future; Expected date: 02/11/2022     H/O bariatric surgery  -     CBC Auto Differential; Future; Expected date: 02/11/2022  -     Comprehensive Metabolic Panel; Future; Expected date: 02/11/2022  -     TSH; Future; Expected date: 02/11/2022  -     Lipid Panel; Future; Expected date: 02/11/2022  -     Vitamin B12; Future; Expected date: 02/11/2022     Low vitamin B12 level  -     Vitamin B12; Future; Expected date: 02/11/2022))     Review of medical records as stated above.  Lab +/- imaging and other ordered diagnostic studies to further evaluate.  See the orders associated with this note visit.  Medications as prescribed as tolerated.    Education including verbal and those noted in the patient instructions.  Revisit as scheduled and call prn.    The following diagnoses influence medical decision making and/or need further workup including the orders listed below:    Rheumatoid arthritis involving multiple sites, unspecified whether rheumatoid factor present  -     AMBROCIO by IFA, w/Rflx; Future; Expected date: 10/19/2022  -     Comprehensive Metabolic Panel; Future; Expected date: 10/19/2022  -     C-Reactive Protein; Future; Expected date: 10/19/2022  -     C3 Complement; Future; Expected date: 10/19/2022  -     C4 Complement; Future; Expected date: 10/19/2022  -     CBC Auto Differential; Future; Expected date: 10/19/2022  -     Urinalysis Microscopic; Future; Expected date: 10/19/2022  -     Sedimentation rate; Future; Expected date: 10/19/2022  -     CK;  Future; Expected date: 10/19/2022  -     sulfaSALAzine (AZULFIDINE) 500 MG EC tablet; Take 1 tablet (500 mg total) by mouth once daily.  Dispense: 30 tablet; Refill: 4  -     tofacitinib (XELJANZ XR) 11 mg Tb24; Take 1 tablet by mouth once daily.  Dispense: 30 tablet; Refill: 4    Pain in other joint  -     AMBROCIO by raghavendra BRADY/Rflx; Future; Expected date: 10/19/2022  -     Comprehensive Metabolic Panel; Future; Expected date: 10/19/2022  -     C-Reactive Protein; Future; Expected date: 10/19/2022  -     C3 Complement; Future; Expected date: 10/19/2022  -     C4 Complement; Future; Expected date: 10/19/2022  -     CBC Auto Differential; Future; Expected date: 10/19/2022  -     Urinalysis Microscopic; Future; Expected date: 10/19/2022  -     Sedimentation rate; Future; Expected date: 10/19/2022  -     CK; Future; Expected date: 10/19/2022    Superficial foreign body of right hand, sequela  -     Ambulatory referral/consult to Orthopedics; Future; Expected date: 08/10/2022  -     AMBROCIO by raghavendra BRADY/Rflx; Future; Expected date: 10/19/2022  -     Comprehensive Metabolic Panel; Future; Expected date: 10/19/2022  -     C-Reactive Protein; Future; Expected date: 10/19/2022  -     C3 Complement; Future; Expected date: 10/19/2022  -     C4 Complement; Future; Expected date: 10/19/2022  -     CBC Auto Differential; Future; Expected date: 10/19/2022  -     Urinalysis Microscopic; Future; Expected date: 10/19/2022  -     Sedimentation rate; Future; Expected date: 10/19/2022  -     CK; Future; Expected date: 10/19/2022    Other osteoarthritis involving multiple joints  -     Ambulatory referral/consult to Orthopedics; Future; Expected date: 08/10/2022  -     AMBROCIO by raghavendra BRADY/Rflx; Future; Expected date: 10/19/2022  -     Comprehensive Metabolic Panel; Future; Expected date: 10/19/2022  -     C-Reactive Protein; Future; Expected date: 10/19/2022  -     C3 Complement; Future; Expected date: 10/19/2022  -     C4 Complement; Future; Expected date:  10/19/2022  -     CBC Auto Differential; Future; Expected date: 10/19/2022  -     Urinalysis Microscopic; Future; Expected date: 10/19/2022  -     Sedimentation rate; Future; Expected date: 10/19/2022  -     CK; Future; Expected date: 10/19/2022    Neuropathy  -     AMBROCIO by raghavendra BRADY/Rflx; Future; Expected date: 10/19/2022  -     Comprehensive Metabolic Panel; Future; Expected date: 10/19/2022  -     C-Reactive Protein; Future; Expected date: 10/19/2022  -     C3 Complement; Future; Expected date: 10/19/2022  -     C4 Complement; Future; Expected date: 10/19/2022  -     CBC Auto Differential; Future; Expected date: 10/19/2022  -     Urinalysis Microscopic; Future; Expected date: 10/19/2022  -     Sedimentation rate; Future; Expected date: 10/19/2022  -     CK; Future; Expected date: 10/19/2022    Cervical spondylosis  -     Ambulatory referral/consult to Physical Medicine Rehab; Future; Expected date: 08/10/2022  -     AMBROCIO by raghavendra BRADY/Rflx; Future; Expected date: 10/19/2022  -     Comprehensive Metabolic Panel; Future; Expected date: 10/19/2022  -     C-Reactive Protein; Future; Expected date: 10/19/2022  -     C3 Complement; Future; Expected date: 10/19/2022  -     C4 Complement; Future; Expected date: 10/19/2022  -     CBC Auto Differential; Future; Expected date: 10/19/2022  -     Urinalysis Microscopic; Future; Expected date: 10/19/2022  -     Sedimentation rate; Future; Expected date: 10/19/2022  -     CK; Future; Expected date: 10/19/2022    Lumbar degenerative disc disease  -     Ambulatory referral/consult to Physical Medicine Rehab; Future; Expected date: 08/10/2022  -     AMBROCIO by JOSE ANTONIO w/Rflx; Future; Expected date: 10/19/2022  -     Comprehensive Metabolic Panel; Future; Expected date: 10/19/2022  -     C-Reactive Protein; Future; Expected date: 10/19/2022  -     C3 Complement; Future; Expected date: 10/19/2022  -     C4 Complement; Future; Expected date: 10/19/2022  -     CBC Auto Differential; Future; Expected  date: 10/19/2022  -     Urinalysis Microscopic; Future; Expected date: 10/19/2022  -     Sedimentation rate; Future; Expected date: 10/19/2022  -     CK; Future; Expected date: 10/19/2022    Myalgia  -     AMBROCIO by IFA, w/Rflx; Future; Expected date: 10/19/2022  -     Comprehensive Metabolic Panel; Future; Expected date: 10/19/2022  -     C-Reactive Protein; Future; Expected date: 10/19/2022  -     C3 Complement; Future; Expected date: 10/19/2022  -     C4 Complement; Future; Expected date: 10/19/2022  -     CBC Auto Differential; Future; Expected date: 10/19/2022  -     Urinalysis Microscopic; Future; Expected date: 10/19/2022  -     Sedimentation rate; Future; Expected date: 10/19/2022  -     CK; Future; Expected date: 10/19/2022    History of non anemic vitamin B12 deficiency  -     AMBROCIO by IFA, w/Rflx; Future; Expected date: 10/19/2022  -     Comprehensive Metabolic Panel; Future; Expected date: 10/19/2022  -     C-Reactive Protein; Future; Expected date: 10/19/2022  -     C3 Complement; Future; Expected date: 10/19/2022  -     C4 Complement; Future; Expected date: 10/19/2022  -     CBC Auto Differential; Future; Expected date: 10/19/2022  -     Urinalysis Microscopic; Future; Expected date: 10/19/2022  -     Sedimentation rate; Future; Expected date: 10/19/2022  -     CK; Future; Expected date: 10/19/2022    Screening for rheumatic disorder  -     AMBROCIO by IFA, w/Rflx; Future; Expected date: 10/19/2022  -     Comprehensive Metabolic Panel; Future; Expected date: 10/19/2022  -     C-Reactive Protein; Future; Expected date: 10/19/2022  -     C3 Complement; Future; Expected date: 10/19/2022  -     C4 Complement; Future; Expected date: 10/19/2022  -     CBC Auto Differential; Future; Expected date: 10/19/2022  -     Urinalysis Microscopic; Future; Expected date: 10/19/2022  -     Sedimentation rate; Future; Expected date: 10/19/2022  -     CK; Future; Expected date: 10/19/2022    Medication monitoring encounter  -      AMBROCIO by IFA, w/Rflx; Future; Expected date: 10/19/2022  -     Comprehensive Metabolic Panel; Future; Expected date: 10/19/2022  -     C-Reactive Protein; Future; Expected date: 10/19/2022  -     C3 Complement; Future; Expected date: 10/19/2022  -     C4 Complement; Future; Expected date: 10/19/2022  -     CBC Auto Differential; Future; Expected date: 10/19/2022  -     Urinalysis Microscopic; Future; Expected date: 10/19/2022  -     Sedimentation rate; Future; Expected date: 10/19/2022  -     CK; Future; Expected date: 10/19/2022      Follow up in about 3 months (around 11/3/2022).     Fabrice Asencio MD

## 2022-07-21 RX ORDER — GABAPENTIN 300 MG/1
300 CAPSULE ORAL 3 TIMES DAILY
Qty: 90 CAPSULE | Refills: 3 | Status: SHIPPED | OUTPATIENT
Start: 2022-07-21 | End: 2022-11-03 | Stop reason: SDUPTHER

## 2022-07-21 RX ORDER — BUPROPION HYDROCHLORIDE 150 MG/1
150 TABLET, FILM COATED, EXTENDED RELEASE ORAL EVERY 12 HOURS
Qty: 60 TABLET | Refills: 3 | Status: SHIPPED | OUTPATIENT
Start: 2022-07-21 | End: 2023-07-13

## 2022-07-21 RX ORDER — ESTRADIOL 0.05 MG/D
FILM, EXTENDED RELEASE TRANSDERMAL
Qty: 8 PATCH | Refills: 11 | Status: SHIPPED | OUTPATIENT
Start: 2022-07-21 | End: 2022-11-03

## 2022-07-21 RX ORDER — BUSPIRONE HYDROCHLORIDE 15 MG/1
TABLET ORAL
Qty: 30 TABLET | Refills: 11 | Status: SHIPPED | OUTPATIENT
Start: 2022-07-21 | End: 2023-08-22 | Stop reason: SDUPTHER

## 2022-07-29 LAB
ALBUMIN SERPL BCP-MCNC: 3.5 G/DL (ref 3.4–5)
ALBUMIN/GLOBULIN RATIO: 0.92 RATIO (ref 1.1–1.8)
ALP SERPL-CCNC: 71 U/L (ref 46–116)
ALT SERPL W P-5'-P-CCNC: 16 U/L (ref 12–78)
ANION GAP SERPL CALC-SCNC: 4 MMOL/L (ref 3–11)
APPEARANCE, UA: ABNORMAL
AST SERPL-CCNC: 20 U/L (ref 15–37)
BACTERIA SPEC CULT: ABNORMAL /HPF
BASOPHILS NFR BLD: 0.3 % (ref 0–3)
BILIRUB SERPL-MCNC: 0.6 MG/DL (ref 0–1)
BILIRUB UR QL STRIP: NEGATIVE MG/DL
BUN SERPL-MCNC: 19 MG/DL (ref 7–18)
BUN/CREAT SERPL: 21.34 RATIO (ref 7–18)
CALCIUM SERPL-MCNC: 8.9 MG/DL (ref 8.8–10.5)
CHLORIDE SERPL-SCNC: 107 MMOL/L (ref 100–108)
CO2 SERPL-SCNC: 29 MMOL/L (ref 21–32)
COLOR UR: YELLOW
CREAT SERPL-MCNC: 0.89 MG/DL (ref 0.55–1.02)
CREATINE KINASE, SERUM: 64 U/L (ref 26–308)
CRP QUANTITATIVE: 0.3 MG/DL (ref 0–0.9)
EOSINOPHIL NFR BLD: 2.3 % (ref 1–3)
ERYTHROCYTE [DISTWIDTH] IN BLOOD BY AUTOMATED COUNT: 13.6 % (ref 12.5–18)
ERYTHROCYTE [SEDIMENTATION RATE] IN BLOOD: 21 MM/HR (ref 0–20)
GFR ESTIMATION: > 60
GLOBULIN: 3.8 G/DL (ref 2.3–3.5)
GLUCOSE (UA): NORMAL MG/DL
GLUCOSE SERPL-MCNC: 93 MG/DL (ref 70–110)
HBV SURFACE AB SER-ACNC: NONREACTIVE M[IU]/ML
HBV SURFACE AG SERPL QL IA: NONREACTIVE
HCT VFR BLD AUTO: 37.9 % (ref 37–47)
HCV AB SERPL QL IA: NONREACTIVE
HGB BLD-MCNC: 12.3 G/DL (ref 12–16)
HGB UR QL STRIP: 25 /UL
KETONES UR QL STRIP: NEGATIVE MG/DL
LEUKOCYTE ESTERASE UR QL STRIP: NEGATIVE /UL
LYMPHOCYTES NFR BLD: 36.4 % (ref 25–40)
MCH RBC QN AUTO: 31.1 PG (ref 27–31.2)
MCHC RBC AUTO-ENTMCNC: 32.5 G/DL (ref 31.8–35.4)
MCV RBC AUTO: 95.7 FL (ref 80–97)
MONOCYTES NFR BLD: 6.2 % (ref 1–15)
NEUTROPHILS # BLD AUTO: 3.35 10*3/UL (ref 1.8–7.7)
NEUTROPHILS NFR BLD: 54.5 % (ref 37–80)
NITRITE UR QL STRIP: NEGATIVE
NUCLEATED RED BLOOD CELLS: 0 %
PH UR STRIP: 6 PH (ref 5–9)
PLATELETS: 274 10*3/UL (ref 142–424)
POTASSIUM SERPL-SCNC: 4.2 MMOL/L (ref 3.6–5.2)
PROT SERPL-MCNC: 7.3 G/DL (ref 6.4–8.2)
PROT UR QL STRIP: ABNORMAL MG/DL
RBC # BLD AUTO: 3.96 10*6/UL (ref 4.2–5.4)
RBC #/AREA URNS HPF: ABNORMAL /HPF (ref 0–2)
SERVICE COMMENT 03: ABNORMAL
SODIUM BLD-SCNC: 140 MMOL/L (ref 135–145)
SP GR UR STRIP: 1.02 (ref 1–1.03)
SPECIMEN COLLECTION METHOD, URINE: ABNORMAL
SQUAMOUS EPITHELIAL, UA: ABNORMAL /LPF
URATE SERPL-MCNC: 5.6 MG/DL (ref 2.6–6)
UROBILINOGEN UR STRIP-ACNC: NORMAL MG/DL
WBC # BLD: 6.2 10*3/UL (ref 4.6–10.2)
WBC #/AREA URNS HPF: ABNORMAL /HPF (ref 0–5)

## 2022-08-01 LAB
ALBUMIN, ELECTROPHORESIS: 3.61 G/DL (ref 3.75–5.01)
ALPHA1 GLOB FLD ELPH-MCNC: 0.29 G/DL (ref 0.19–0.46)
ALPHA2 GLOB FLD ELPH-MCNC: 0.65 G/DL (ref 0.48–1.05)
ANA SER-ACNC: NORMAL
B-GLOBULIN FLD ELPH-MCNC: 0.83 G/DL (ref 0.48–1.1)
C3 SERPL-MCNC: 113 MG/DL (ref 90–180)
C4 NEF SERPL-MCNC: 21 MG/DL (ref 10–40)
CYCLIC CITRULLINATED PEPTIDE (CCP)AB(IGG): 2 UNITS (ref 0–19)
EER PROTEIN ELECTROPHORESIS, SERUM: ABNORMAL
GAMMA GLOB FLD ELPH-MCNC: 1.42 G/DL (ref 0.62–1.51)
HEPATITIS B CORE AB TOTAL: NEGATIVE
IMMUNOFIXATION INTERPRETATION: ABNORMAL
RHEUMATOID FACT SERPL-ACNC: <10 IU/ML (ref 0–14)
TOTAL PROTEIN, ELECTROPHORESIS: 6.8 G/DL (ref 6.3–8.2)

## 2022-08-02 LAB
QUANTIFERON MITOGEN MINUS NIL: >10 IU/ML
QUANTIFERON PLUS TB1 MINUS NIL: 0 IU/ML (ref 0–0.34)
QUANTIFERON PLUS TB2 MINUS NIL: 0 IU/ML (ref 0–0.34)
QUANTIFERON-TB GOLD PLUS: NEGATIVE
QUANTIFERON-TB NIL: 0.02 IU/ML

## 2022-08-03 ENCOUNTER — OFFICE VISIT (OUTPATIENT)
Dept: RHEUMATOLOGY | Facility: CLINIC | Age: 47
End: 2022-08-03
Payer: MEDICARE

## 2022-08-03 ENCOUNTER — PATIENT MESSAGE (OUTPATIENT)
Dept: FAMILY MEDICINE | Facility: CLINIC | Age: 47
End: 2022-08-03
Payer: MEDICARE

## 2022-08-03 ENCOUNTER — TELEPHONE (OUTPATIENT)
Dept: FAMILY MEDICINE | Facility: CLINIC | Age: 47
End: 2022-08-03
Payer: MEDICARE

## 2022-08-03 VITALS
DIASTOLIC BLOOD PRESSURE: 81 MMHG | HEIGHT: 67 IN | WEIGHT: 293 LBS | RESPIRATION RATE: 16 BRPM | SYSTOLIC BLOOD PRESSURE: 130 MMHG | BODY MASS INDEX: 45.99 KG/M2 | HEART RATE: 82 BPM | OXYGEN SATURATION: 98 %

## 2022-08-03 DIAGNOSIS — M79.10 MYALGIA: ICD-10-CM

## 2022-08-03 DIAGNOSIS — Z86.39 HISTORY OF NON ANEMIC VITAMIN B12 DEFICIENCY: ICD-10-CM

## 2022-08-03 DIAGNOSIS — M47.812 CERVICAL SPONDYLOSIS: ICD-10-CM

## 2022-08-03 DIAGNOSIS — I10 ESSENTIAL HYPERTENSION: Primary | ICD-10-CM

## 2022-08-03 DIAGNOSIS — Z51.81 MEDICATION MONITORING ENCOUNTER: ICD-10-CM

## 2022-08-03 DIAGNOSIS — Z13.89 SCREENING FOR RHEUMATIC DISORDER: ICD-10-CM

## 2022-08-03 DIAGNOSIS — M15.8 OTHER OSTEOARTHRITIS INVOLVING MULTIPLE JOINTS: ICD-10-CM

## 2022-08-03 DIAGNOSIS — M06.9 RHEUMATOID ARTHRITIS INVOLVING MULTIPLE SITES, UNSPECIFIED WHETHER RHEUMATOID FACTOR PRESENT: Primary | ICD-10-CM

## 2022-08-03 DIAGNOSIS — G62.9 NEUROPATHY: ICD-10-CM

## 2022-08-03 DIAGNOSIS — S60.551S: ICD-10-CM

## 2022-08-03 DIAGNOSIS — M25.59 PAIN IN OTHER JOINT: ICD-10-CM

## 2022-08-03 DIAGNOSIS — M51.36 LUMBAR DEGENERATIVE DISC DISEASE: ICD-10-CM

## 2022-08-03 PROCEDURE — 99214 OFFICE O/P EST MOD 30 MIN: CPT | Mod: S$GLB,,, | Performed by: INTERNAL MEDICINE

## 2022-08-03 PROCEDURE — 99214 PR OFFICE/OUTPT VISIT, EST, LEVL IV, 30-39 MIN: ICD-10-PCS | Mod: S$GLB,,, | Performed by: INTERNAL MEDICINE

## 2022-08-03 RX ORDER — SULFASALAZINE 500 MG/1
500 TABLET, DELAYED RELEASE ORAL DAILY
Qty: 30 TABLET | Refills: 4 | Status: SHIPPED | OUTPATIENT
Start: 2022-08-03 | End: 2022-11-03 | Stop reason: SDUPTHER

## 2022-08-03 RX ORDER — TOFACITINIB 11 MG/1
1 TABLET, FILM COATED, EXTENDED RELEASE ORAL DAILY
Qty: 30 TABLET | Refills: 4 | Status: SHIPPED | OUTPATIENT
Start: 2022-08-03 | End: 2022-11-03 | Stop reason: SDUPTHER

## 2022-08-03 NOTE — PATIENT INSTRUCTIONS
Sulfasalazine (Azulfidine) is considered a disease-modifying anti-rheumatic drug (DMARD). It can decrease the pain and swelling of arthritis, prevent joint damage, and reduce the risk of long-term disability. Sulfasalazine is used in the treatment of rheumatoid arthritis (RA), inflammatory bowel disease, and some other autoimmune conditions. It works to lower inflammation in the body.    Sulfasalazine comes in a 500mg tablet and should be taken with food and a full glass of water to avoid an upset stomach. The medication is often started at low doses when treating RA to prevent side effects, typically one - two tablets a day. After the first week, the dose may be slowly increased to the usual dosage of two tablets (1g) twice a day. This dose can be increased to up to six pills (3g) a day in some situations. An Enteric-coated (or stomach-coated) preparation is available that may lessen some of the side effects associated with sulfasalazine, particularly stomach upset. This form of sulfasalazine should not be crushed or chewed. Adequate fluid intake is required to prevent prevent kidney stones. It usually takes between two - three months to notice any improvement in RA symptoms after starting sulfasalazine.    In general, most patients can take sulfasalazine with few side effects. The most common side effects are nausea and abdominal discomfort, which occurs early in the course of treatment. Serious side effects, such as stomach ulcers, are actually less common with sulfasalazine than with non-steroidal anti-inflammatory drugs (NSAIDs) such as ibuprofen.    Abdominal side effects that do occur with sulfasalazine usually improve with time, and are often avoided by slowly increasing from a low starting dose. Sulfasalazine also is available in an enteric-coated (stomach-coated) pill that helps prevent nausea and abdominal discomfort.    Sun sensitivity of the skin can also be a a side effect. Those on sulfasalazine  should use sunscreen (SPF 15 or higher) when outdoors and avoid prolonged exposure to sunlight. Some people will develop orange colored urine and even orange skin. This should not cause alarm. It is usually harmless and goes away after medication is stopped. In some cases, sulfasalazine may reduce the number of disease-fighting white blood cells in the body. This often does not cause symptoms, but can be detected by regular blood tests performed by your doctor. Sulfasalazine also increases the risk of reduced blood counts in people born with deficiency of an enzyme called Glucose-6-phosphate dehydrogenase. Most rashes are not serious, but occasionally patients taking sulfasalazine develop a more severe rash and should be evaluated by their doctor to determine if the medication should be discontinued.

## 2022-08-04 ENCOUNTER — PATIENT MESSAGE (OUTPATIENT)
Dept: ADMINISTRATIVE | Facility: OTHER | Age: 47
End: 2022-08-04
Payer: MEDICARE

## 2022-08-11 ENCOUNTER — PATIENT MESSAGE (OUTPATIENT)
Dept: FAMILY MEDICINE | Facility: CLINIC | Age: 47
End: 2022-08-11
Payer: MEDICARE

## 2022-08-11 ENCOUNTER — PATIENT MESSAGE (OUTPATIENT)
Dept: ADMINISTRATIVE | Facility: OTHER | Age: 47
End: 2022-08-11
Payer: MEDICARE

## 2022-08-16 ENCOUNTER — TELEPHONE (OUTPATIENT)
Dept: FAMILY MEDICINE | Facility: CLINIC | Age: 47
End: 2022-08-16
Payer: MEDICARE

## 2022-08-16 NOTE — TELEPHONE ENCOUNTER
----- Message from Gisselle Delgadillo sent at 8/16/2022  3:34 PM CDT -----  Regarding: Orders  Contact: patient  Per phone call with patient,she is requesting orders for a mammogram to be done.  Please return call at [phone] (home).    Thanks,  SJ

## 2022-08-18 ENCOUNTER — PATIENT OUTREACH (OUTPATIENT)
Dept: ADMINISTRATIVE | Facility: HOSPITAL | Age: 47
End: 2022-08-18
Payer: MEDICARE

## 2022-08-18 NOTE — PROGRESS NOTES
Working the  Colon report: No colon records found in [facility] or [facility]

## 2022-09-14 ENCOUNTER — PATIENT MESSAGE (OUTPATIENT)
Dept: FAMILY MEDICINE | Facility: CLINIC | Age: 47
End: 2022-09-14
Payer: MEDICARE

## 2022-09-27 LAB — BCS RECOMMENDATION EXT: NORMAL

## 2022-10-02 ENCOUNTER — PATIENT MESSAGE (OUTPATIENT)
Dept: FAMILY MEDICINE | Facility: CLINIC | Age: 47
End: 2022-10-02
Payer: MEDICARE

## 2022-10-10 ENCOUNTER — PATIENT MESSAGE (OUTPATIENT)
Dept: FAMILY MEDICINE | Facility: CLINIC | Age: 47
End: 2022-10-10
Payer: MEDICARE

## 2022-10-20 NOTE — PROGRESS NOTES
Subjective:      Patient ID: Lilly Loera is a 47 y.o. female.    Chief Complaint: Disease Management    HPI:   Includes joint pain with subjective swelling.   Antecedent event includes: None;  Pain location includes: hips, knees, neck, and back;  Gradual onset; beginning >years ago;  Constant ache, Moderate in severity,   Lasting >hours, Worse at all times;   Improved with rest, medication, change in position, and none;   Worsened with activity, overuse, stress, and rest;         Review of Systems   Constitutional:  Negative for fever and unexpected weight change.   HENT:  Negative for mouth sores and trouble swallowing.    Eyes:  Negative for redness.   Respiratory:  Negative for cough and shortness of breath.    Cardiovascular:  Negative for chest pain.   Gastrointestinal:  Negative for constipation and diarrhea.   Genitourinary:  Negative for dysuria and genital sores.   Musculoskeletal:  Positive for arthralgias and joint swelling.   Integumentary:  Negative for rash.   Neurological:  Positive for weakness. Negative for headaches.   Hematological:  Does not bruise/bleed easily.   Psychiatric/Behavioral:  Positive for sleep disturbance. The patient is nervous/anxious.     Past Medical History:   Diagnosis Date    Anemia     Anxiety     Arthritis     CHF (congestive heart failure)     Depression     Hidradenitis suppurativa     Hypertension     Polyneuropathy      Past Surgical History:   Procedure Laterality Date    club foot correction      gall blader removed      GASTRIC BYPASS      gastric sleeve      HERNIA REPAIR      HYSTERECTOMY      partial    meniscus repair surgery      repaired ac1      under arm graphs        See the Assessment/Plan for further characterization of the HPI, ROS, Medical, Surgical, Family, and Social Histories including Tobacco use, Meds; all of which has been reviewed in Epic.    Medication List with Changes/Refills   New Medications    TRAMADOL (ULTRAM) 50 MG TABLET    Take  "2 tablets (100 mg total) by mouth 3 (three) times daily as needed (moderate to severe pain).   Current Medications    BUDESONIDE-FORMOTEROL 80-4.5 MCG (SYMBICORT) 80-4.5 MCG/ACTUATION HFAA    Inhale 2 puffs into the lungs. Controller    BUPROPION HCL, SMOKING DETER, (ZYBAN) 150 MG TBSR 12 HR TABLET    Take 1 tablet (150 mg total) by mouth every 12 (twelve) hours.    BUSPIRONE (BUSPAR) 15 MG TABLET    Pt is to take 1/3 (5 mg) or 1/2 (7.5mgs) or one po HS PRN    CALCIUM CARBONATE (OS-MICHELLE) 500 MG CALCIUM (1,250 MG) CHEWABLE TABLET    Take 1 tablet by mouth once daily.    CYANOCOBALAMIN 1,000 MCG/ML INJECTION    Inject 1,000 mcg as directed.    ENTRESTO 49-51 MG PER TABLET    Take 1 tablet by mouth 2 (two) times daily.    IBUPROFEN (ADVIL,MOTRIN) 800 MG TABLET    Take 1 tablet (800 mg total) by mouth every 6 (six) hours as needed for Pain.    METOPROLOL SUCCINATE (TOPROL-XL) 50 MG 24 HR TABLET    Take 50 mg by mouth.    MULTIVITAMIN CAPSULE    Take 1 capsule by mouth once daily.    MUPIROCIN (BACTROBAN) 2 % OINTMENT    3 (three) times daily. Apply to affected area    PANTOPRAZOLE (PROTONIX) 40 MG TABLET    Take 1 tablet (40 mg total) by mouth once daily.    SURE COMFORT INSULIN SYRINGE 1 ML 29 GAUGE X 1/2" SYRG       Changed and/or Refilled Medications    Modified Medication Previous Medication    GABAPENTIN (NEURONTIN) 300 MG CAPSULE gabapentin (NEURONTIN) 300 MG capsule       Take 1 capsule (300 mg total) by mouth 3 (three) times daily.    Take 1 capsule (300 mg total) by mouth 3 (three) times daily.    SULFASALAZINE (AZULFIDINE) 500 MG EC TABLET sulfaSALAzine (AZULFIDINE) 500 MG EC tablet       Take 1 tablet (500 mg total) by mouth once daily.    Take 1 tablet (500 mg total) by mouth once daily.    TOFACITINIB (XELJANZ XR) 11 MG TB24 tofacitinib (XELJANZ XR) 11 mg Tb24       Take 1 tablet by mouth once daily.    Take 1 tablet by mouth once daily.   Discontinued Medications    ESTRADIOL 0.05 MG/24 HR TD PTSW (PAWAN) " "0.05 MG/24 HR    APPLY 1 PATCH TOPICALLY TWICE A WEEK         Objective:   BP (!) 150/106   Pulse 77   Resp 18   Ht 5' 7" (1.702 m)   Wt (!) 151.8 kg (334 lb 9.6 oz)   SpO2 99%   BMI 52.41 kg/m²   Physical Exam  Non-toxic appearance. No distress.   Normocephalic and atraumatic.   External ears normal.    Conjunctivae and EOM are normal. No scleral icterus.   RRR, No friction rub; palpable distal pulses.    No tachypnea or signs of respiratory distress.   Abdominal: No guarding or rebound tenderness.   Neurological: Oriented. Normal thought content. No cranial nerve deficit. Strength is grossly normal without focal deficit.  Skin is warm. No pallor.   Musculoskeletal: see below for further input.     [facility] UNKNOWN RAD EAP                                RADIOLOGY REPORT        PT NAME: CAMI SANDERS      [facility]     : 1975 F 47             [address]    ACCT: [id number]                                              [facility] Rec #: [id number]                                        [id number]    Patient Location: [facility].RADM[facility]/             Procedure: GLENIS SCREEN INDIRA W CAD RHINA    REQUISITION #: [id number]      REPORT #: [id number]           DATE OF EXAM: 22    TIME OF EXAM: 0918       CMS MANDATED QUALITY DATA - MAMMOGRAPHY - 225        [facility] utilizes a reminder system to ensure that all   patients receive reminder letters. This includes reminders for routine   screening mammograms, diagnostic mammograms or other breast imaging studies    or interventions where appropriate. This patient's information will be   entered into the appropriate reminder system.                BILATERAL DIGITAL SCREENING MAMMOGRAPHY            HISTORY: Screening, Z 12.31        TECHNIQUE: Bilateral digital CC and MLO views obtained. 3-D tomosynthesis   imaging. CAD utilized during film review.        COMPARISON: 2021    Breast density: Scattered fibroglandular " densities.            FINDINGS:  There is no dominant mass. There are no suspicious calcifications   and there are no areas of architectural distortion. Overall no change from   prior studies.        Assessment: BI-RADS Category 1, negative.        Recommendation: Routine mammography in one year.        P1 17.44%        Based on ACR recommendations, the patient's lifetime risk for developing   breast cancer has been assessed today. There are number of models available,   but at [facility] we employ the Tyrer-Macon model. Tyrer-Macon is a   comprehensive model that takes a number of factors into consideration to   help determine risk including: parity, height, weight, hormone replacement   therapy, age of menopause, age of childbirth, breast biopsy, and family   history. All of these factors are weighted into a calculator to produce this   value. Those patients whose lifetime risk is 20% or greater should consider    adding a breast MRI to annual mammogram screening.        DICTATING PHYSICIAN:  GARRETT LOUIS MD                   Date Dictated: 09/27/22 1535        Signed By:  GARRETT LOUIS MD <Electronically signed by GARRETT LOUIS MD in OV>    Date Signed:  09/27/22 1536     CC: CHANTEL POST MD ; CHANTEL POST MD      ADMITTING PHYSICIAN:                                                                                                    ORDERING PHY: CHANTEL POST MD                                                                                                                                                      ATTENDING PHY: CHANTEL POST MD    Patient Status:  REG CLI    Admit Service Date: 09/27/22         Orders Only on 07/29/2022   Component Date Value Ref Range Status    QuantiFERON-TB Gold Plus 07/29/2022 NEGATIVE  Negative Final    Comment: Interpretive Data: Quantiferon TB Gold PlusInterferon gamma release is measured for specimens from each ofthe four collection tubes. A qualitative result  (Negative,Positive, or Indeterminate) is based on interpretation of thefour values, NIL, MITOGEN   minus NIL (MITOGEN-NIL), TB1 minus NIL(TB1-NIL), and TB2 minus NIL (TB2-NIL). The NIL value representsnonspecific reactivity produced by the patient specimen. TheMITOGEN-NIL value serves as the positive control for the patientspecimen, demonstrating   successful lymphocyte activity. TheTB1-NIL tube specifically detects CD4+ lymphocyte reactivity,specifically stimulated by the TB1 antigens. The TB2-NIL tubedetects both CD4+ and CD8+ lymphocyte reactivity, stimulated byTB2 antigens. An overall Negative   result does not completelyrule out TB infection.A false-positive result in the absence of other clinicalevidence of TB infection is not uncommon. Refer to: UpdatedGuidelines for Using Interferon Gamma Release Assays to DetectMycobacterium                            tuberculosis   Infection --- [place], 2010(http://www.cdc.gov/mmwr/preview/mmwrhtml/wn3859a8.htm), formore information concerning test performance in low-prevalencepopulations and use in occupational screening.      QuantiFERON TB1 NIL 07/29/2022 0.00  0.00 - 0.34 IU/mL Final    QuantiFERON TB2 NIL 07/29/2022 0.00  0.00 - 0.34 IU/mL Final    QuantiFERON Mitogen Minus NIL 07/29/2022 >10.00  IU/mL Final    QuantiFERON TB NIL 07/29/2022 0.02  IU/mL Final    Performed By: [facility] [place] [id number] Director: Mario Saab MD, PhD   Orders Only on 07/29/2022   Component Date Value Ref Range Status    Hepatitis B Core Ab Total 07/29/2022 NEGATIVE  Negative Final    Comment: INTERPRETIVE INFORMATION: Hepatitis B Core Ab (Total)This assay should not be used for blood donor screening,associated re-entry protocols, or for screening Human Cells,Tissues and Cellular and Tissue-Based Products (HCT/P).Performed By: [facility]   [address] [place] [id number] Director: Mario Saab MD, PHD       C3 Complement 07/29/2022 113  90 - 180 mg/dL Final    Comment: REFERENCE INTERVAL: Complement Component 3Access complete set of age- and/or gender-specific referenceintervals for this test in the [facility] Test Directory([email or web address]).Performed By: [facility] [address] [place] [address]   [id number] Director: Mario Saab MD, PhD      C4 Complement 07/29/2022 21  10 - 40 mg/dL Final    Comment: REFERENCE INTERVAL: Complement Component 4Access complete set of age- and/or gender-specific referenceintervals for this test in the [facility] Test Directory([email or web address]).Performed By: [facility] [address] [place] [address]   [id number] Director: Mairo Saab MD, PhD      AMBROCIO 07/29/2022 NONE DETECTED  None Detected Final    Comment: If suspicion of connective tissue disease is strong and AMBROCIO EIAis negative, consider testing for AMBROCIO by IFA ([id number]).No antibodies to Anti-Nuclear Antibodies (AMBROCIO) detected.  TheExtractable Nuclear Antigen Antibodies (RNP, Perales, SSA 52, SSA60,   Scleroderma, Sharona-1 and SSB) and Double Stranded DNA (dsDNA)Antibody, IgG will not be performed.INTERPRETIVE INFORMATION: Anti-Nuclear Antibodies (AMBROCIO), IgG byELISAAntinuclear Antibodies (AMBROCIO), IgG by ROEL: AMBROCIO specimens arescreened using enzyme-linked   immunosorbent assay (ROEL)methodology. All ROEL results reported as Detected are furthertested by indirect fluorescent assay (IFA) using HEp-2 substratewith an IgG-specific conjugate. The AMBROCIO ROEL screen is designedto detect antibodies against dsDNA,   histones, SS-A (Ro), SS-B(La), Perales, Smith/RNP, Scl-70, Sharona-1, centromeric proteins,other antigens extracted from the HEp-2 cell nucleus. AMBROCIO ELISAassays have been reported to have lower sensitivities than ANAIFA for systemic autoimmune rheum                           atic   diseases (SARD).Negative results do not necessarily rule out SARD.Performed By: [facility] [address] [place] [address]  [place], [place] [address]Lckrwxnqds Director: Mario Saab MD, PhD      Rheumatoid Factor 07/29/2022 <10  0 - 14 IU/mL Final    Performed By: [facility] [place] [id number] Director: Mario Saab MD, PhD    Cyclic Citrullinated Peptide (CCP)* 07/29/2022 2  0 - 19 Units Final    Comment: INTERPRETIVE INFORMATION: Cyclic Citrullinated Peptide Antibody,IgG  19 Units or less ................... Negative  20-39 Units ........................ Weak Positive  40-59 Units ........................ Moderate Positive  60 Units or greater   ................ Strong PositiveAnti-cyclic citrullinated peptide (anti-CCP), IgG antibodies arepresent in about 69-83 percent of patients with rheumatoidarthritis (RA) and have specificities of 93-95 percent. Theseautoantibodies may be present in the   preclinical phase ofdisease, are associated with future RA development, and maypredict radiographic joint destruction. Patients with weakpositive results should be monitored and testing repeated.Performed By: [facility] [place] [id number] Director: Mario Saab MD, PhD      Total Protein, Electrophoresis 07/29/2022 6.8  6.3 - 8.2 g/dL Final    Albumin, Electrophoresis 07/29/2022 3.61 (A)  3.75 - 5.01 g/dL Final    Alpha-1-Globulin 07/29/2022 0.29  0.19 - 0.46 g/dL Final    Alpha-2-Globulin 07/29/2022 0.65  0.48 - 1.05 g/dL Final    Beta Globulin 07/29/2022 0.83  0.48 - 1.10 g/dL Final    Gamma Globulin 07/29/2022 1.42  0.62 - 1.51 g/dL Final    Immunofix Interp. 07/29/2022 SEE NOTE   Final    Normal SPEP pattern.  Immunofixation electrophoresis (LUCHO)is a more sensitive technique for the identification ofsmall M-proteins.    EER Protein Electrophoresis 07/29/2022 SEE NOTE   Final    Authorized individuals can access the [facility] Report using the following link:[email or web address] By: [facility] [facility] [place] [address]Fhrqoakrlx Director: Mario Saab MD, PhD   Orders Only on 07/29/2022   Component Date Value Ref Range Status    Hep B S Ab 07/29/2022 Nonreactive  Nonreactive Final    Hepatitis B Surface Ag 07/29/2022 Nonreactive  Nonreactive Final    Hepatitis C Ab 07/29/2022 Nonreactive  Nonreactive Final   Orders Only on 07/29/2022   Component Date Value Ref Range Status    WBC 07/29/2022 6.2  4.6 - 10.2 10*3/uL Final    RBC 07/29/2022 3.96 (L)  4.2 - 5.4 10*6/uL Final    Hemoglobin 07/29/2022 12.3  12.0 - 16.0 g/dL Final    Hematocrit 07/29/2022 37.9  37.0 - 47.0 % Final    MCV 07/29/2022 95.7  80 - 97 fL Final    MCH 07/29/2022 31.1  27.0 - 31.2 pg Final    MCHC 07/29/2022 32.5  31.8 - 35.4 g/dL Final    RDW RBC Auto-Rto 07/29/2022 13.6  12.5 - 18.0 % Final    Platelets 07/29/2022 274  142 - 424 10*3/uL Final    Neutrophils 07/29/2022 54.5  37 - 80 % Final    Lymphocytes 07/29/2022 36.4  25 - 40 % Final    Monocytes 07/29/2022 6.2  1 - 15 % Final    Eosinophils 07/29/2022 2.3  1 - 3 % Final    Basophils 07/29/2022 0.3  0 - 3 % Final    nRBC# 07/29/2022 0.0  % Final    Neutrophils Absolute 07/29/2022 3.35  1.8 - 7.7 10*3/uL Final    Sed Rate 07/29/2022 21 (H)  0 - 20 mm/hr Final   Orders Only on 07/29/2022   Component Date Value Ref Range Status    Sodium 07/29/2022 140  135 - 145 mmol/L Final    Potassium 07/29/2022 4.2  3.6 - 5.2 mmol/L Final    Chloride 07/29/2022 107  100 - 108 mmol/L Final    CO2 07/29/2022 29  21 - 32 mmol/L Final    Anion Gap 07/29/2022 4.0  3.0 - 11.0 mmol/L Final    BUN 07/29/2022 19 (H)  7 - 18 mg/dL Final    Creatinine 07/29/2022 0.89  0.55 - 1.02 mg/dL Final    GFR ESTIMATION 07/29/2022 > 60  >60 Final               DESCRIPTION       GLOMERULAR FILTRATION RATE             NORMAL                     >60             KIDNEY  DISEASE           15-60             KIDNEY FAILURE             <15    BUN/Creatinine Ratio 07/29/2022 21.34 (H)  7 - 18 Ratio Final    Glucose  07/29/2022 93  70 - 110 mg/dL Final    Calcium 07/29/2022 8.9  8.8 - 10.5 mg/dL Final    Total Bilirubin 07/29/2022 0.6  0.0 - 1.0 mg/dL Final    AST 07/29/2022 20  15 - 37 U/L Final    ALT 07/29/2022 16  12 - 78 U/L Final    Total Protein 07/29/2022 7.3  6.4 - 8.2 g/dL Final    Albumin 07/29/2022 3.5  3.4 - 5.0 g/dL Final    Globulin 07/29/2022 3.8 (H)  2.3 - 3.5 g/dL Final    Albumin/Globulin Ratio 07/29/2022 0.921 (L)  1.1 - 1.8 Ratio Final    Alkaline Phosphatase 07/29/2022 71  46 - 116 U/L Final    Uric Acid 07/29/2022 5.6  2.6 - 6.0 mg/dL Final    CK, Serum 07/29/2022 64  26 - 308 U/L Final    CRP QUANTITATIVE 07/29/2022 0.3  0.0 - 0.9 mg/dL Final   Orders Only on 07/29/2022   Component Date Value Ref Range Status    Specimen Collection Method 07/29/2022 Void   Final    Color, UA 07/29/2022 Yellow  Yellow Final    Appearance, UA 07/29/2022 Very Cloudy (A)  Clear Final    pH, UA 07/29/2022 6.0  5.0 - 9.0 pH Final    Specific Gravity, UA 07/29/2022 1.020  1.000 - 1.030 Final    Protein, UA 07/29/2022 Trace (A)  Negative mg/dL Final    Glucose, UA 07/29/2022 Normal  Normal mg/dL Final    Ketones, UA 07/29/2022 Negative  Negative mg/dL Final    Occult Blood UA 07/29/2022 25 (A)  Negative /uL Final    Nitrite, UA 07/29/2022 Negative  Negative Final    Bilirubin (UA) 07/29/2022 Negative  Negative mg/dL Final    Urobilinogen, UA 07/29/2022 Normal  Normal mg/dL Final    Leukocytes, UA 07/29/2022 Negative  Negative /uL Final    RBC, UA 07/29/2022 0-2  0 - 2 /HPF Final    WBC, UA 07/29/2022 None  0 - 5 /HPF Final    Squam Epithel, UA 07/29/2022 2+ Moderate  /LPF Final    Bacteria 07/29/2022 1+ Few  Few/HPF /HPF Final    Service Comment 03 07/29/2022 No, Criteria Not Met   Final   Office Visit on 02/11/2022   Component Date Value Ref Range Status    WBC 02/11/2022 6.9  3.8 - 10.8 Thousand/uL Final    RBC 02/11/2022 4.15  3.80 - 5.10 Million/uL Final    Hemoglobin 02/11/2022 12.9  11.7 - 15.5 g/dL Final    Hematocrit  02/11/2022 39.9  35.0 - 45.0 % Final    MCV 02/11/2022 96.1  80.0 - 100.0 fL Final    MCH 02/11/2022 31.1  27.0 - 33.0 pg Final    MCHC 02/11/2022 32.3  32.0 - 36.0 g/dL Final    RDW 02/11/2022 12.1  11.0 - 15.0 % Final    Platelets 02/11/2022 298  140 - 400 Thousand/uL Final    MPV 02/11/2022 10.5  7.5 - 12.5 fL Final    Neutrophils, Abs 02/11/2022 4,388  1,500 - 7,800 cells/uL Final    Lymph # 02/11/2022 1,925  850 - 3,900 cells/uL Final    Mono # 02/11/2022 449  200 - 950 cells/uL Final    Eos # 02/11/2022 117  15 - 500 cells/uL Final    Baso # 02/11/2022 21  0 - 200 cells/uL Final    Neutrophils Relative 02/11/2022 63.6  % Final    Lymph % 02/11/2022 27.9  % Final    Mono % 02/11/2022 6.5  % Final    Eosinophil % 02/11/2022 1.7  % Final    Basophil % 02/11/2022 0.3  % Final    Glucose 02/11/2022 95  65 - 99 mg/dL Final    Comment:               Fasting reference interval         BUN 02/11/2022 19  7 - 25 mg/dL Final    Creatinine 02/11/2022 0.91  0.50 - 1.10 mg/dL Final    eGFR if non African American 02/11/2022 77  > OR = 60 mL/min/1.73m2 Final    eGFR if African American 02/11/2022 89  > OR = 60 mL/min/1.73m2 Final    BUN/Creatinine Ratio 02/11/2022 NOT APPLICABLE  6 - 22 (calc) Final    Sodium 02/11/2022 140  135 - 146 mmol/L Final    Potassium 02/11/2022 4.1  3.5 - 5.3 mmol/L Final    Chloride 02/11/2022 105  98 - 110 mmol/L Final    CO2 02/11/2022 29  20 - 32 mmol/L Final    Calcium 02/11/2022 9.5  8.6 - 10.2 mg/dL Final    Total Protein 02/11/2022 7.3  6.1 - 8.1 g/dL Final    Albumin 02/11/2022 4.0  3.6 - 5.1 g/dL Final    Globulin, Total 02/11/2022 3.3  1.9 - 3.7 g/dL (calc) Final    Albumin/Globulin Ratio 02/11/2022 1.2  1.0 - 2.5 (calc) Final    Total Bilirubin 02/11/2022 1.0  0.2 - 1.2 mg/dL Final    Alkaline Phosphatase 02/11/2022 73  31 - 125 U/L Final    AST 02/11/2022 19  10 - 30 U/L Final    ALT 02/11/2022 12  6 - 29 U/L Final    TSH 02/11/2022 1.27  mIU/L Final    Comment:           Reference  Range                         > or = 20 Years  0.40-4.50                              Pregnancy Ranges            First trimester    0.26-2.66            Second trimester   0.55-2.73            Third trimester    0.43-2.91      Cholesterol 02/11/2022 155  <200 mg/dL Final    HDL 02/11/2022 52  > OR = 50 mg/dL Final    Triglycerides 02/11/2022 68  <150 mg/dL Final    LDL Cholesterol 02/11/2022 88  mg/dL (calc) Final    Comment: Reference range: <100     Desirable range <100 mg/dL for primary prevention;    <70 mg/dL for patients with CHD or diabetic patients   with > or = 2 CHD risk factors.     LDL-C is now calculated using the Tavia   calculation, which is a validated novel method providing   better accuracy than the Friedewald equation in the   estimation of LDL-C.   Clint ZACARIAS et al. JEFFERY. 2013;310(19): 8082-4224   (http://education.[employer]/faq/RYX729)      HDL/Cholesterol Ratio 02/11/2022 3.0  <5.0 (calc) Final    Non HDL Chol. (LDL+VLDL) 02/11/2022 103  <130 mg/dL (calc) Final    Comment: For patients with diabetes plus 1 major ASCVD risk   factor, treating to a non-HDL-C goal of <100 mg/dL   (LDL-C of <70 mg/dL) is considered a therapeutic   option.      Vitamin B-12 02/11/2022 >2000 (H)  200 - 1100 pg/mL Final        All of the data above and below has been reviewed by myself and any further interpretations will be reflected in the assessment and plan.   The data includes review of external notes, and independent interpretation of lab results, x-rays, and imaging reports.  Active inflammatory arthritis is an illness that poses a threat to bodily function and even a threat to life in some cases.  Drug therapy to treat inflammatory arthritis usually requires intensive monitoring for toxicity.    Review of patient's allergies indicates:   Allergen Reactions    Adhesive Blisters, Hives, Itching and Swelling    Adhesive tape-silicones      whelp and redness     Assessment/Plan:       Prev  noted/prior plan:   (No overt synovitis  Deformities including ankles feet  DJD  Edema in feet lower legs  Hyperpigmentation skin changes noted        Verbal education        Blood work and xrays to further evaluate     We will seek records from Dr. Washington     We will seek records from Dermatology Dr. Samuel and she tells me she is working on getting back with Dermatology      She will consider also working with Orthopedics and Physical Medicine and Rehab MD if needed     Cont xeljanz 11mg daily     ibuprofen 800mg prn     Revisit weeks      Contact us prn)     Last visit:     Interim lab:        Lab Results   Component Value Date      07/29/2022     K 4.2 07/29/2022      07/29/2022     CO2 29 07/29/2022     ANIONGAP 4.0 07/29/2022     GLU 93 07/29/2022     CALCIUM 8.9 07/29/2022     BUN 19 (H) 07/29/2022     CREATININE 0.89 07/29/2022     TSH 1.27 02/11/2022     PROT 7.3 07/29/2022     AST 20 07/29/2022     ALT 16 07/29/2022     BILITOT 0.6 07/29/2022     ALKPHOS 71 07/29/2022     WBC 6.2 07/29/2022     NEUTROPHILS 54.5 07/29/2022     HGB 12.3 07/29/2022     MCV 95.7 07/29/2022      02/11/2022     LABPLAT 274 07/29/2022     SEDRATE 21 (H) 07/29/2022     WBCUA None 07/29/2022     RBCUA 0-2 07/29/2022     BACTERIA 1+ Few 07/29/2022     PROTEINUA Trace (A) 07/29/2022         CPK 64     SPEP normal        AMBROCIO neg RF neg CCP neg HepBsAgAbcoreAbneg HCV neg uric acid 5.6        ESR 21 CRP 0.3        Interim 7/29/22 xray reports:  CXR COMPARISON:  April 6, 2021    FINDINGS:  Cardiovascular: Normal.    Lungs and pleura: Normal.    Mediastinal and hilar structures: Normal.    Osseous structures: Normal.         lumbar spine  Anterior osteophytic spicules seen off the vertebrae from L3 to S1.    Disc spaces: Normal.    Soft tissues: Normal.    Additional findings: None seen.    IMPRESSION:    1.  Mild degenerative changes.           cervical spine COMPARISON:  June 11, 2013    Anterior osteophytic spicules  seen off the vertebrae from C5 to C7.   C1 and C2: Normal.    Soft tissues: Normal.    Additional findings: None seen.    IMPRESSION:    1.  Mild degenerative changes.           right hand  small radiopaque foreign body in the distal aspect of the second digit. This has the appearance of a broken off sewing needle.    Additional findings: None seen.    Small radiopaque foreign body in the second digit.           Hips/pelvis normal     right knee  loss of joint space in all 3 compartments with irregularity the  articular surface and marginal osteophyte formation off of all 3 bony elements.    Soft tissues: Normal.    Additional findings: None seen.    IMPRESSION:    1.  Osteoarthritis.           left knee  loss of joint space in all 3 compartments most notably in the lateral compartment. There is irregularity the articular surface and subchondral sclerosis. Marginal osteophyte formation is seen off of all 3 bony elements.    Soft tissues: Normal.    Additional findings: None seen.    IMPRESSION:    1.  Osteoarthritis.            right foot  flattening of the normal arch of the foot. Early osteoarthritic  changes appear present in the hindfoot between the talus and navicular bone  predominantly.    Soft tissues: Normal.    Additional findings: None seen.    IMPRESSION:    1.  Flattening of the foot with osteoarthritic changes in the hindfoot.           left foot  Osteoarthritic changes are noted in the hindfoot involving the Chopart joints with loss of joint space and marginal osteophyte formation.    Soft tissues: Normal.    Additional findings: None seen.    IMPRESSION:    1.  Hindfoot osteoarthritis.                 Interim records from Dr. Washington some of most recent note 12/15/21 Televisit: whole body hurts; xeljanz and prednisone 5mg working well; A/P: Rheumatoid arthritis; hidradinitis suppurativa; was on humira for HS; lab showed AMBROCIO+ dsDNA+ after humira; past meds problem with Methotrexate injection; CHF avoid  TNF; does not tolerate plaquenil GI upset; on xeljanz 11mg daily prior left arm fracture; will resume xeljanz 11mg as has been off because of hernia surgery; start prednisone 5mg daily; hidradenitis active discuss with Dermatology; biotene and artificial tears for sicca; lab revisit 3 months        Interim records from Dermatology Dr. Samuel and she tells me she is working on getting back with Dermatology:         Gabapentin 300mg tid     xeljanz 11mg daily     ibuprofen 800mg prn     She has some interim pain and symptoms of inflammatory arthritis     No overt synovitis  Ankle deformities     Went over lab and xray reports     She tells me she is to see a new Dermatologist Dr. Iraida Perales as no longer seeing Dr. Samuel?     She had seen Podiatry in past per records but she does not know who or where noted     We can add sulfasalazine 500mg daily although I am not seeing overt active inflammatory arthritis     We will refer to Orthopedics for her DJD and the foreign body in right hand     We will also refer to Physical Medicine and Rehab MD for her chronic back pain DJD DDD     We will reseek records from Dr. Samuel     Revisit 3 months with lab 2 weeks prior     Contact us prn       This visit:    Interim lab 10/31/22:      C3,4 normal    Creat 1.36 GFR 49; alb 4  Prior creat 0.89    WBC 7.7; Hgb 11.6 prior 12.3; MCV 97; plt 271    UA SG 1.02 moderate epi cells ketones    AMBROCIO neg     ESR 10; CRP 3.5      Interim records from Dr. Samuel:      Prev noted/prior plan:  (Gabapentin 300mg tid   xeljanz 11mg daily   ibuprofen 800mg prn   No overt synovitis  Ankle deformities   She tells me she is to see a new Dermatologist Dr. Iraida Perales as no longer seeing Dr. Samuel?   She had seen Podiatry in past per records but she does not know who or where noted   We can add sulfasalazine 500mg daily although I am not seeing overt active inflammatory arthritis   We will refer to Orthopedics for her DJD and the  foreign body in right hand   We will also refer to Physical Medicine and Rehab MD for her chronic back pain DJD DDD   We will reseek records from Dr. Samuel)       She has interim symptoms of inflammatory arthritis but sounds like mostly pain in back neck hips and knees    Did she see Orthopedics? Yes looks like  per interim phone note with PCP and was doing PT and was to have MRI    She tells me she has not had MRI yet with Physical Medicine and Rehab MD;   tells me she was sent to PT and found to have leg length asymmetry and she also had interim fall and she is working on contacting revising     She tells me she also saw Orthopedics for the foreign body and hand and carpal tunnel and he told her not worry about it as she has not been having any hand pain? noted     No overt synovitis she rather clarifies she has not had much joint swelling interim    Lower extremity edema and ankle deformities    Went over lab    Careful with NSAIDs prior ibuprofen given the renal function and consider Nephrology if persists; she tells me she has been using a lot of ibuprofen that she had left over from June since her interim fall and she will take care    She can use add tylenol +/- tramadol until she gets back with ; She does not have to wait for her visit; she can call them just as she can call us; we are here for her    Cont other:    Gabapentin 300mg tid   xeljanz 11mg daily   sulfasalazine 500mg daily     We will seek records from Orthopedics most recent note    We will seek records from Physical Medicine and Rehab MD most recent note    We will rereseek records from Dr. Samuel    Revisit 3 months with lab 2 weeks prior    Contact us prn      AMBROCIO neg repeat AMBROCIO neg RF neg CCP neg HepBsAgAbcoreAbneg HCV neg uric acid 5.6  B12>2000  Rheumatoid arthritis per limited records     2022 TB negative     Previously working with Dr. Washington     records from Dr. Washington some of most recent note 12/15/21 Televisit: whole body  hurts; xeljanz and prednisone 5mg working well; A/P: Rheumatoid arthritis; hidradinitis suppurativa; was on humira for HS; lab showed AMBROCIO+ dsDNA+ after humira; past meds problem with Methotrexate injection; CHF avoid TNF; does not tolerate plaquenil GI upset; on xeljanz 11mg daily prior left arm fracture; will resume xeljanz 11mg as has been off because of hernia surgery; start prednisone 5mg daily; hidradenitis active discuss with Dermatology; biotene and artificial tears for sicca; lab revisit 3 months      There is also some mechanical and neurogenic pain.     7/29/22 xray reports:  CXR COMPARISON:  April 6, 2021    FINDINGS:  Cardiovascular: Normal.    Lungs and pleura: Normal.    Mediastinal and hilar structures: Normal.    Osseous structures: Normal.         lumbar spine  Anterior osteophytic spicules seen off the vertebrae from L3 to S1.    Disc spaces: Normal.    Soft tissues: Normal.    Additional findings: None seen.    IMPRESSION:    1.  Mild degenerative changes.           cervical spine COMPARISON:  June 11, 2013    Anterior osteophytic spicules seen off the vertebrae from C5 to C7.   C1 and C2: Normal.    Soft tissues: Normal.    Additional findings: None seen.    IMPRESSION:    1.  Mild degenerative changes.           right hand  small radiopaque foreign body in the distal aspect of the second digit. This has the appearance of a broken off sewing needle.    Additional findings: None seen.    Small radiopaque foreign body in the second digit.           Hips/pelvis normal     right knee  loss of joint space in all 3 compartments with irregularity the  articular surface and marginal osteophyte formation off of all 3 bony elements.    Soft tissues: Normal.    Additional findings: None seen.    IMPRESSION:    1.  Osteoarthritis.           left knee  loss of joint space in all 3 compartments most notably in the lateral compartment. There is irregularity the articular surface and subchondral sclerosis.  Marginal osteophyte formation is seen off of all 3 bony elements.    Soft tissues: Normal.    Additional findings: None seen.    IMPRESSION:    1.  Osteoarthritis.            right foot  flattening of the normal arch of the foot. Early osteoarthritic  changes appear present in the hindfoot between the talus and navicular bone  predominantly.    Soft tissues: Normal.    Additional findings: None seen.    IMPRESSION:    1.  Flattening of the foot with osteoarthritic changes in the hindfoot.           left foot  Osteoarthritic changes are noted in the hindfoot involving the Chopart joints with loss of joint space and marginal osteophyte formation.    Soft tissues: Normal.    Additional findings: None seen.    IMPRESSION:    1.  Hindfoot osteoarthritis.           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 Samuel for Hidradenitis then had skin reaction hives thought from [facility] noted     Prior problem with Methotrexate nausea     Prior Prednisone 5mg     ibuprofen 800mg prn     Neurontin 300mg bid     CaD     She tells me last week she could not walk and often needs a walker noted prev noted     She does not recall ever getting xrays for her joints     She has not seen Dr. Washington in years and PCP has been giving xeljanz and intermittent steroids prednisone prev noted     Prev noted/prior plan:   (No overt synovitis  Deformities including ankles feet  DJD  Edema in feet lower legs  Hyperpigmentation skin changes noted  Verbal education  Blood work and xrays to further evaluate  We will seek records from Dr. Washington  We will seek records from Dermatology Dr. Samuel and she tells me she is working on getting back with  Dermatology   She will consider also working with Orthopedics and Physical Medicine and Rehab MD if needed  Cont xeljanz 11mg daily  ibuprofen 800mg prn  Revisit weeks   Contact us prn)        Has been working with:     DM insulin     CHF followed by Cardiology     Hidradenitis per records      Also with history of rashes and hives     We will seek records from Dermatology and she tells me she is working on getting back with Dermatology prev noted     Smoking cessation     Records PCP Dr. Puckett some of recent note 2/11/22:     (( Establish Care (Pt had 2 falls that broke her arm), Referral (Pt need referral for a dietician and also a referral to a mental health facility. ), and Dizziness (Pt stated the dizziness until pt had covid)  HPI   Was seeing Dr Washington and states she has had a bunch of lab work but no appointment. She has been on methotrexate in the past but it made her sick     Patient states she had a pneumonia and influenza vaccine in 2018 and she had a severe reaction. She states she was waiting on Dr Washington to let her know if she is safe to get the covid vaccine. She states she has had it twice now     Her podatrist is Dr Cryer. 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 A brace has been ordered. She also has polyneuropathy which was discovered by a neurologist       Dr Bae is her Cardiologist     Neurologist is Dr Bai             Past Medical History:   Diagnosis Date    Anemia      Anxiety      Arthritis      CHF (congestive heart failure)      Depression      Hidradenitis suppurativa      Hypertension      Polyneuropathy                  Past Surgical History:   Procedure Laterality Date    club foot correction        gall blader removed        GASTRIC BYPASS        gastric sleeve        HERNIA REPAIR        HYSTERECTOMY         partial    meniscus repair surgery        repaired ac1        under arm graphs          Assessment:          ICD-10-CM ICD-9-CM   1. Polyarthritis  M13.0  716.50   2. Obesity, morbid  E66.01 278.01   3. Encounter for screening for malignant neoplasm of breast, unspecified screening modality  Z12.39 V76.10   4. Encounter for screening mammogram for malignant neoplasm of breast   Z12.31 V76.12   5. Hypertension, unspecified type  I10 401.9   6. H/O bariatric surgery  Z98.84 V45.86   7. Low vitamin B12 level  E53.8 266.2       Plan:      Polyarthritis  -     Ambulatory referral/consult to Rheumatology; Future; Expected date: 02/18/2022  -     CBC Auto Differential; Future; Expected date: 02/11/2022  -     Comprehensive Metabolic Panel; Future; Expected date: 02/11/2022  -     TSH; Future; Expected date: 02/11/2022  -     Lipid Panel; Future; Expected date: 02/11/2022  -     Vitamin B12; Future; Expected date: 02/11/2022     Obesity, morbid     Encounter for screening for malignant neoplasm of breast, unspecified screening modality  -     Mammo Digital Screening Bilat; Future; Expected date: 02/11/2022     Encounter for screening mammogram for malignant neoplasm of breast   -     Mammo Digital Screening Bilat; Future; Expected date: 02/11/2022     Hypertension, unspecified type  -     CBC Auto Differential; Future; Expected date: 02/11/2022  -     Comprehensive Metabolic Panel; Future; Expected date: 02/11/2022  -     TSH; Future; Expected date: 02/11/2022  -     Lipid Panel; Future; Expected date: 02/11/2022  -     Vitamin B12; Future; Expected date: 02/11/2022     H/O bariatric surgery  -     CBC Auto Differential; Future; Expected date: 02/11/2022  -     Comprehensive Metabolic Panel; Future; Expected date: 02/11/2022  -     TSH; Future; Expected date: 02/11/2022  -     Lipid Panel; Future; Expected date: 02/11/2022  -     Vitamin B12; Future; Expected date: 02/11/2022     Low vitamin B12 level  -     Vitamin B12; Future; Expected date: 02/11/2022))     Review of medical records as stated above.  Lab +/- imaging and other ordered diagnostic studies to further  evaluate.  See the orders associated with this note visit.  Medications as prescribed as tolerated.    Education including verbal and those noted in the patient instructions.  Revisit as scheduled and call prn.    The following diagnoses influence medical decision making and/or need further workup including the orders listed below:    Rheumatoid arthritis involving multiple sites, unspecified whether rheumatoid factor present  -     CBC Auto Differential; Future; Expected date: 01/19/2023  -     CK; Future; Expected date: 01/19/2023  -     Comprehensive Metabolic Panel; Future; Expected date: 01/19/2023  -     C-Reactive Protein; Future; Expected date: 01/19/2023  -     Sedimentation rate; Future; Expected date: 01/19/2023  -     Urinalysis; Future; Expected date: 01/19/2023  -     Ferritin; Future; Expected date: 01/19/2023  -     tofacitinib (XELJANZ XR) 11 mg Tb24; Take 1 tablet by mouth once daily.  Dispense: 30 tablet; Refill: 4  -     sulfaSALAzine (AZULFIDINE) 500 MG EC tablet; Take 1 tablet (500 mg total) by mouth once daily.  Dispense: 30 tablet; Refill: 4    Other osteoarthritis involving multiple joints  -     CBC Auto Differential; Future; Expected date: 01/19/2023  -     CK; Future; Expected date: 01/19/2023  -     Comprehensive Metabolic Panel; Future; Expected date: 01/19/2023  -     C-Reactive Protein; Future; Expected date: 01/19/2023  -     Sedimentation rate; Future; Expected date: 01/19/2023  -     Urinalysis; Future; Expected date: 01/19/2023  -     Ferritin; Future; Expected date: 01/19/2023  -     traMADoL (ULTRAM) 50 mg tablet; Take 2 tablets (100 mg total) by mouth 3 (three) times daily as needed (moderate to severe pain).  Dispense: 42 tablet; Refill: 0    Superficial foreign body of right hand, sequela  -     CBC Auto Differential; Future; Expected date: 01/19/2023  -     CK; Future; Expected date: 01/19/2023  -     Comprehensive Metabolic Panel; Future; Expected date: 01/19/2023  -      C-Reactive Protein; Future; Expected date: 01/19/2023  -     Sedimentation rate; Future; Expected date: 01/19/2023  -     Urinalysis; Future; Expected date: 01/19/2023  -     Ferritin; Future; Expected date: 01/19/2023    Neuropathy  -     CBC Auto Differential; Future; Expected date: 01/19/2023  -     CK; Future; Expected date: 01/19/2023  -     Comprehensive Metabolic Panel; Future; Expected date: 01/19/2023  -     C-Reactive Protein; Future; Expected date: 01/19/2023  -     Sedimentation rate; Future; Expected date: 01/19/2023  -     Urinalysis; Future; Expected date: 01/19/2023  -     Ferritin; Future; Expected date: 01/19/2023  -     gabapentin (NEURONTIN) 300 MG capsule; Take 1 capsule (300 mg total) by mouth 3 (three) times daily.  Dispense: 90 capsule; Refill: 3    Cervical spondylosis  -     CBC Auto Differential; Future; Expected date: 01/19/2023  -     CK; Future; Expected date: 01/19/2023  -     Comprehensive Metabolic Panel; Future; Expected date: 01/19/2023  -     C-Reactive Protein; Future; Expected date: 01/19/2023  -     Sedimentation rate; Future; Expected date: 01/19/2023  -     Urinalysis; Future; Expected date: 01/19/2023  -     Ferritin; Future; Expected date: 01/19/2023  -     traMADoL (ULTRAM) 50 mg tablet; Take 2 tablets (100 mg total) by mouth 3 (three) times daily as needed (moderate to severe pain).  Dispense: 42 tablet; Refill: 0    Lumbar degenerative disc disease  -     CBC Auto Differential; Future; Expected date: 01/19/2023  -     CK; Future; Expected date: 01/19/2023  -     Comprehensive Metabolic Panel; Future; Expected date: 01/19/2023  -     C-Reactive Protein; Future; Expected date: 01/19/2023  -     Sedimentation rate; Future; Expected date: 01/19/2023  -     Urinalysis; Future; Expected date: 01/19/2023  -     Ferritin; Future; Expected date: 01/19/2023  -     traMADoL (ULTRAM) 50 mg tablet; Take 2 tablets (100 mg total) by mouth 3 (three) times daily as needed (moderate to  severe pain).  Dispense: 42 tablet; Refill: 0    Myalgia  -     CBC Auto Differential; Future; Expected date: 01/19/2023  -     CK; Future; Expected date: 01/19/2023  -     Comprehensive Metabolic Panel; Future; Expected date: 01/19/2023  -     C-Reactive Protein; Future; Expected date: 01/19/2023  -     Sedimentation rate; Future; Expected date: 01/19/2023  -     Urinalysis; Future; Expected date: 01/19/2023  -     Ferritin; Future; Expected date: 01/19/2023    History of non anemic vitamin B12 deficiency  -     CBC Auto Differential; Future; Expected date: 01/19/2023  -     CK; Future; Expected date: 01/19/2023  -     Comprehensive Metabolic Panel; Future; Expected date: 01/19/2023  -     C-Reactive Protein; Future; Expected date: 01/19/2023  -     Sedimentation rate; Future; Expected date: 01/19/2023  -     Urinalysis; Future; Expected date: 01/19/2023  -     Ferritin; Future; Expected date: 01/19/2023    Medication monitoring encounter  -     CBC Auto Differential; Future; Expected date: 01/19/2023  -     CK; Future; Expected date: 01/19/2023  -     Comprehensive Metabolic Panel; Future; Expected date: 01/19/2023  -     C-Reactive Protein; Future; Expected date: 01/19/2023  -     Sedimentation rate; Future; Expected date: 01/19/2023  -     Urinalysis; Future; Expected date: 01/19/2023  -     Ferritin; Future; Expected date: 01/19/2023    Renal insufficiency  -     CBC Auto Differential; Future; Expected date: 01/19/2023  -     CK; Future; Expected date: 01/19/2023  -     Comprehensive Metabolic Panel; Future; Expected date: 01/19/2023  -     C-Reactive Protein; Future; Expected date: 01/19/2023  -     Sedimentation rate; Future; Expected date: 01/19/2023  -     Urinalysis; Future; Expected date: 01/19/2023  -     Ferritin; Future; Expected date: 01/19/2023    Follow up in about 3 months (around 2/3/2023).     Fabrice Asencio MD

## 2022-11-01 LAB
ABS NRBC COUNT: 0 X 10 3/UL (ref 0–0.01)
ABSOLUTE BASOPHIL: 0.02 X 10 3/UL (ref 0–0.22)
ABSOLUTE EOSINOPHIL: 0.15 X 10 3/UL (ref 0.04–0.54)
ABSOLUTE IMMATURE GRAN: 0.03 X 10 3/UL (ref 0–0.04)
ABSOLUTE LYMPHOCYTE: 2.81 X 10 3/UL (ref 0.86–4.75)
ABSOLUTE MONOCYTE: 0.45 X 10 3/UL (ref 0.22–1.08)
ALBUMIN SERPL-MCNC: 4 G/DL (ref 3.5–5.2)
ALBUMIN/GLOB SERPL ELPH: 1.3 {RATIO} (ref 1–2.7)
ALP ISOS SERPL LEV INH-CCNC: 75 U/L (ref 35–105)
ALT (SGPT): 12 U/L (ref 0–33)
AMORPH URATE CRY URNS QL MICRO: NEGATIVE
ANA SER-ACNC: NEGATIVE
ANION GAP SERPL CALC-SCNC: 7 MMOL/L (ref 8–17)
AST SERPL-CCNC: 24 U/L (ref 0–32)
BACTERIA #/AREA URNS HPF: ABNORMAL /[HPF]
BASOPHILS NFR BLD: 0.3 % (ref 0.2–1.2)
BILIRUB UR QL STRIP: ABNORMAL
BILIRUBIN, TOTAL: 0.49 MG/DL (ref 0–1.2)
BUN/CREAT SERPL: 16.5 (ref 6–20)
C3 SERPL-MCNC: 100 MG/DL (ref 90–180)
C4 NEF SERPL-MCNC: 16 MG/DL (ref 10–40)
CALCIUM SERPL-MCNC: 9.1 MG/DL (ref 8.6–10.2)
CARBON DIOXIDE, CO2: 29 MMOL/L (ref 22–29)
CHLORIDE: 103 MMOL/L (ref 98–107)
CK SERPL-CCNC: 113 U/L (ref 26–192)
CLARITY UR: ABNORMAL
COLOR UR: YELLOW
CREAT SERPL-MCNC: 1.36 MG/DL (ref 0.5–0.9)
CRP QUALITATIVE: NEGATIVE MG/L
CRP QUANTITATIVE: 3.5 MG/L
EOSINOPHIL NFR BLD: 1.9 % (ref 0.7–7)
EPITHELIAL CELLS: ABNORMAL
GFR ESTIMATION: 48.95
GLOBULIN: 3 G/DL (ref 1.5–4.5)
GLUCOSE (UA): NEGATIVE MG/DL
GLUCOSE: 82 MG/DL (ref 74–106)
HCT VFR BLD AUTO: 36 % (ref 37–47)
HGB BLD-MCNC: 11.6 G/DL (ref 12–16)
HYALINE CASTS #/AREA URNS LPF: ABNORMAL /[LPF]
IMMATURE GRANULOCYTES: 0.4 % (ref 0–0.5)
KETONES UR QL STRIP: ABNORMAL MG/DL
LEUKOCYTE ESTERASE UR QL STRIP: ABNORMAL
LYMPHOCYTES NFR BLD: 36.5 % (ref 19.3–53.1)
MCH RBC QN AUTO: 31.3 PG (ref 27–32)
MCHC RBC AUTO-ENTMCNC: 32.2 G/DL (ref 32–36)
MCV RBC AUTO: 97 FL (ref 82–100)
MONOCYTES NFR BLD: 5.8 % (ref 4.7–12.5)
MUCOUS THREADS URNS QL MICRO: ABNORMAL
NEUTROPHILS # BLD AUTO: 4.24 X 10 3/UL (ref 2.15–7.56)
NEUTROPHILS NFR BLD: 55.1 % (ref 34–71.1)
NITRITE UR QL STRIP: NEGATIVE
NUCLEATED RED BLOOD CELLS: 0 /100 WBC (ref 0–0.2)
OCCULT BLOOD: NEGATIVE
PH, URINE: 5 (ref 5–7.5)
PLATELET # BLD AUTO: 271 X 10 3/UL (ref 135–400)
POTASSIUM: 4.3 MMOL/L (ref 3.5–5.1)
PROT SNV-MCNC: 7 G/DL (ref 6.4–8.3)
PROT UR QL STRIP: 25 MG/DL
RBC # BLD AUTO: 3.71 X 10 6/UL (ref 4.2–5.4)
RBC/HPF: ABNORMAL
RDW-SD: 48.6 FL (ref 37–54)
SED RATE (WESTERGREN): 10 MM/HR (ref 0–20)
SODIUM: 139 MMOL/L (ref 136–145)
SP GR UR STRIP: 1.02 (ref 1–1.03)
UREA NITROGEN (BUN): 22.4 MG/DL (ref 6–20)
UROBILINOGEN, URINE: 4 E.U./DL (ref 0–1)
WBC # BLD: 7.7 X 10 3/UL (ref 4.3–10.8)
WBC/HPF: ABNORMAL

## 2022-11-03 ENCOUNTER — OFFICE VISIT (OUTPATIENT)
Dept: RHEUMATOLOGY | Facility: CLINIC | Age: 47
End: 2022-11-03
Payer: MEDICARE

## 2022-11-03 VITALS
BODY MASS INDEX: 45.99 KG/M2 | WEIGHT: 293 LBS | DIASTOLIC BLOOD PRESSURE: 106 MMHG | OXYGEN SATURATION: 99 % | RESPIRATION RATE: 18 BRPM | SYSTOLIC BLOOD PRESSURE: 150 MMHG | HEIGHT: 67 IN | HEART RATE: 77 BPM

## 2022-11-03 DIAGNOSIS — S60.551S: ICD-10-CM

## 2022-11-03 DIAGNOSIS — Z51.81 MEDICATION MONITORING ENCOUNTER: ICD-10-CM

## 2022-11-03 DIAGNOSIS — M06.9 RHEUMATOID ARTHRITIS INVOLVING MULTIPLE SITES, UNSPECIFIED WHETHER RHEUMATOID FACTOR PRESENT: Primary | ICD-10-CM

## 2022-11-03 DIAGNOSIS — M51.36 LUMBAR DEGENERATIVE DISC DISEASE: ICD-10-CM

## 2022-11-03 DIAGNOSIS — M79.10 MYALGIA: ICD-10-CM

## 2022-11-03 DIAGNOSIS — M15.8 OTHER OSTEOARTHRITIS INVOLVING MULTIPLE JOINTS: ICD-10-CM

## 2022-11-03 DIAGNOSIS — N28.9 RENAL INSUFFICIENCY: ICD-10-CM

## 2022-11-03 DIAGNOSIS — Z86.39 HISTORY OF NON ANEMIC VITAMIN B12 DEFICIENCY: ICD-10-CM

## 2022-11-03 DIAGNOSIS — G62.9 NEUROPATHY: ICD-10-CM

## 2022-11-03 DIAGNOSIS — M47.812 CERVICAL SPONDYLOSIS: ICD-10-CM

## 2022-11-03 PROCEDURE — 4010F ACE/ARB THERAPY RXD/TAKEN: CPT | Mod: CPTII,S$GLB,, | Performed by: INTERNAL MEDICINE

## 2022-11-03 PROCEDURE — 3080F DIAST BP >= 90 MM HG: CPT | Mod: CPTII,S$GLB,, | Performed by: INTERNAL MEDICINE

## 2022-11-03 PROCEDURE — 3077F PR MOST RECENT SYSTOLIC BLOOD PRESSURE >= 140 MM HG: ICD-10-PCS | Mod: CPTII,S$GLB,, | Performed by: INTERNAL MEDICINE

## 2022-11-03 PROCEDURE — 3080F PR MOST RECENT DIASTOLIC BLOOD PRESSURE >= 90 MM HG: ICD-10-PCS | Mod: CPTII,S$GLB,, | Performed by: INTERNAL MEDICINE

## 2022-11-03 PROCEDURE — 1159F MED LIST DOCD IN RCRD: CPT | Mod: CPTII,S$GLB,, | Performed by: INTERNAL MEDICINE

## 2022-11-03 PROCEDURE — 1160F RVW MEDS BY RX/DR IN RCRD: CPT | Mod: CPTII,S$GLB,, | Performed by: INTERNAL MEDICINE

## 2022-11-03 PROCEDURE — 4010F PR ACE/ARB THEARPY RXD/TAKEN: ICD-10-PCS | Mod: CPTII,S$GLB,, | Performed by: INTERNAL MEDICINE

## 2022-11-03 PROCEDURE — 99214 OFFICE O/P EST MOD 30 MIN: CPT | Mod: S$GLB,,, | Performed by: INTERNAL MEDICINE

## 2022-11-03 PROCEDURE — 3077F SYST BP >= 140 MM HG: CPT | Mod: CPTII,S$GLB,, | Performed by: INTERNAL MEDICINE

## 2022-11-03 PROCEDURE — 99214 PR OFFICE/OUTPT VISIT, EST, LEVL IV, 30-39 MIN: ICD-10-PCS | Mod: S$GLB,,, | Performed by: INTERNAL MEDICINE

## 2022-11-03 PROCEDURE — 3008F BODY MASS INDEX DOCD: CPT | Mod: CPTII,S$GLB,, | Performed by: INTERNAL MEDICINE

## 2022-11-03 PROCEDURE — 1159F PR MEDICATION LIST DOCUMENTED IN MEDICAL RECORD: ICD-10-PCS | Mod: CPTII,S$GLB,, | Performed by: INTERNAL MEDICINE

## 2022-11-03 PROCEDURE — 1160F PR REVIEW ALL MEDS BY PRESCRIBER/CLIN PHARMACIST DOCUMENTED: ICD-10-PCS | Mod: CPTII,S$GLB,, | Performed by: INTERNAL MEDICINE

## 2022-11-03 PROCEDURE — 3008F PR BODY MASS INDEX (BMI) DOCUMENTED: ICD-10-PCS | Mod: CPTII,S$GLB,, | Performed by: INTERNAL MEDICINE

## 2022-11-03 RX ORDER — TOFACITINIB 11 MG/1
1 TABLET, FILM COATED, EXTENDED RELEASE ORAL DAILY
Qty: 30 TABLET | Refills: 4 | Status: SHIPPED | OUTPATIENT
Start: 2022-11-03 | End: 2023-02-03 | Stop reason: SDUPTHER

## 2022-11-03 RX ORDER — SULFASALAZINE 500 MG/1
500 TABLET, DELAYED RELEASE ORAL DAILY
Qty: 30 TABLET | Refills: 4 | Status: SHIPPED | OUTPATIENT
Start: 2022-11-03 | End: 2023-02-03 | Stop reason: SDUPTHER

## 2022-11-03 RX ORDER — TRAMADOL HYDROCHLORIDE 50 MG/1
100 TABLET ORAL 3 TIMES DAILY PRN
Qty: 42 TABLET | Refills: 0 | Status: SHIPPED | OUTPATIENT
Start: 2022-11-03 | End: 2023-02-03 | Stop reason: SDUPTHER

## 2022-11-03 RX ORDER — GABAPENTIN 300 MG/1
300 CAPSULE ORAL 3 TIMES DAILY
Qty: 90 CAPSULE | Refills: 3 | Status: SHIPPED | OUTPATIENT
Start: 2022-11-03 | End: 2023-02-03 | Stop reason: SDUPTHER

## 2022-11-16 ENCOUNTER — PATIENT MESSAGE (OUTPATIENT)
Dept: ADMINISTRATIVE | Facility: HOSPITAL | Age: 47
End: 2022-11-16
Payer: MEDICARE

## 2022-11-17 ENCOUNTER — OFFICE VISIT (OUTPATIENT)
Dept: FAMILY MEDICINE | Facility: CLINIC | Age: 47
End: 2022-11-17
Payer: MEDICARE

## 2022-11-17 VITALS
SYSTOLIC BLOOD PRESSURE: 151 MMHG | DIASTOLIC BLOOD PRESSURE: 97 MMHG | BODY MASS INDEX: 45.99 KG/M2 | HEART RATE: 76 BPM | HEIGHT: 67 IN | RESPIRATION RATE: 16 BRPM | OXYGEN SATURATION: 97 % | TEMPERATURE: 97 F | WEIGHT: 293 LBS

## 2022-11-17 DIAGNOSIS — M54.41 ACUTE BILATERAL LOW BACK PAIN WITH BILATERAL SCIATICA: Primary | ICD-10-CM

## 2022-11-17 DIAGNOSIS — M54.42 ACUTE BILATERAL LOW BACK PAIN WITH BILATERAL SCIATICA: Primary | ICD-10-CM

## 2022-11-17 PROCEDURE — 3008F BODY MASS INDEX DOCD: CPT | Mod: CPTII,S$GLB,, | Performed by: OBSTETRICS & GYNECOLOGY

## 2022-11-17 PROCEDURE — 99213 PR OFFICE/OUTPT VISIT, EST, LEVL III, 20-29 MIN: ICD-10-PCS | Mod: S$GLB,,, | Performed by: OBSTETRICS & GYNECOLOGY

## 2022-11-17 PROCEDURE — 99213 OFFICE O/P EST LOW 20 MIN: CPT | Mod: S$GLB,,, | Performed by: OBSTETRICS & GYNECOLOGY

## 2022-11-17 PROCEDURE — 3080F PR MOST RECENT DIASTOLIC BLOOD PRESSURE >= 90 MM HG: ICD-10-PCS | Mod: CPTII,S$GLB,, | Performed by: OBSTETRICS & GYNECOLOGY

## 2022-11-17 PROCEDURE — 3077F PR MOST RECENT SYSTOLIC BLOOD PRESSURE >= 140 MM HG: ICD-10-PCS | Mod: CPTII,S$GLB,, | Performed by: OBSTETRICS & GYNECOLOGY

## 2022-11-17 PROCEDURE — 1160F PR REVIEW ALL MEDS BY PRESCRIBER/CLIN PHARMACIST DOCUMENTED: ICD-10-PCS | Mod: CPTII,S$GLB,, | Performed by: OBSTETRICS & GYNECOLOGY

## 2022-11-17 PROCEDURE — 1159F MED LIST DOCD IN RCRD: CPT | Mod: CPTII,S$GLB,, | Performed by: OBSTETRICS & GYNECOLOGY

## 2022-11-17 PROCEDURE — 4010F ACE/ARB THERAPY RXD/TAKEN: CPT | Mod: CPTII,S$GLB,, | Performed by: OBSTETRICS & GYNECOLOGY

## 2022-11-17 PROCEDURE — 3077F SYST BP >= 140 MM HG: CPT | Mod: CPTII,S$GLB,, | Performed by: OBSTETRICS & GYNECOLOGY

## 2022-11-17 PROCEDURE — 1160F RVW MEDS BY RX/DR IN RCRD: CPT | Mod: CPTII,S$GLB,, | Performed by: OBSTETRICS & GYNECOLOGY

## 2022-11-17 PROCEDURE — 3080F DIAST BP >= 90 MM HG: CPT | Mod: CPTII,S$GLB,, | Performed by: OBSTETRICS & GYNECOLOGY

## 2022-11-17 PROCEDURE — 1159F PR MEDICATION LIST DOCUMENTED IN MEDICAL RECORD: ICD-10-PCS | Mod: CPTII,S$GLB,, | Performed by: OBSTETRICS & GYNECOLOGY

## 2022-11-17 PROCEDURE — 4010F PR ACE/ARB THEARPY RXD/TAKEN: ICD-10-PCS | Mod: CPTII,S$GLB,, | Performed by: OBSTETRICS & GYNECOLOGY

## 2022-11-17 PROCEDURE — 3008F PR BODY MASS INDEX (BMI) DOCUMENTED: ICD-10-PCS | Mod: CPTII,S$GLB,, | Performed by: OBSTETRICS & GYNECOLOGY

## 2022-11-17 RX ORDER — LIDOCAINE 50 MG/G
1 PATCH TOPICAL DAILY
Qty: 90 PATCH | Refills: 1 | Status: SHIPPED | OUTPATIENT
Start: 2022-11-17 | End: 2023-02-15

## 2022-11-17 RX ORDER — ACETAMINOPHEN 500 MG
TABLET ORAL
COMMUNITY
Start: 2022-09-01 | End: 2023-07-13

## 2022-11-17 RX ORDER — DULOXETIN HYDROCHLORIDE 30 MG/1
30 CAPSULE, DELAYED RELEASE ORAL
COMMUNITY
End: 2023-02-03

## 2022-11-17 RX ORDER — HYDROCORTISONE 1 %
GEL (GRAM) TOPICAL
COMMUNITY
End: 2023-02-03

## 2022-11-17 NOTE — PROGRESS NOTES
Subjective:   Patient ID: Lilly Loera is a 47 y.o. female who presents for evaluation today.    Chief Complaint:  Referral (Patient states that she needs a referral for pain. She has been in pain for 3 months and nothing is helping. )    History of Present Illness  HPI  Patient presents today to discuss her options for dealing with her lower back pain. She is currently taking tramadol for the symptoms. She is interested in trying a lidocaine patch. She has seen an orthopedic but would like a second opinion. She is also currently in physical therapy. She has osteoarthritis as well. She states she is in pain every day.     FAMILY HISTORY  Family History   Problem Relation Age of Onset    Hypertension Mother     Arthritis Mother     Hypertension Father     Arthritis Father     Alcohol abuse Father     Heart disease Father      SOCIAL HISTORY  Social History     Tobacco Use   Smoking Status Every Day    Packs/day: 0.50    Types: Cigarettes   Smokeless Tobacco Never   ,   Social History     Substance and Sexual Activity   Alcohol Use Yes     MEDICATIONS  Outpatient Medications Marked as Taking for the 11/17/22 encounter (Office Visit) with Barb Jaramillo NP   Medication Sig Dispense Refill    budesonide-formoterol 80-4.5 mcg (SYMBICORT) 80-4.5 mcg/actuation HFAA Inhale 2 puffs into the lungs. Controller      busPIRone (BUSPAR) 15 MG tablet Pt is to take 1/3 (5 mg) or 1/2 (7.5mgs) or one po HS PRN 30 tablet 11    cholecalciferol, vitamin D3, 125 mcg (5,000 unit) Tab       cyanocobalamin 1,000 mcg/mL injection Inject 1,000 mcg as directed.      cyanocobalamin-cobamamide (B12) 5,000-100 mcg Lozg       DULoxetine (CYMBALTA) 30 MG capsule Take 30 mg by mouth.      ENTRESTO 49-51 mg per tablet Take 1 tablet by mouth 2 (two) times daily.      gabapentin (NEURONTIN) 300 MG capsule Take 1 capsule (300 mg total) by mouth 3 (three) times daily. 90 capsule 3    ibuprofen (ADVIL,MOTRIN) 800 MG tablet Take 1 tablet (800  "mg total) by mouth every 6 (six) hours as needed for Pain. 60 tablet 1    metoprolol succinate (TOPROL-XL) 50 MG 24 hr tablet Take 50 mg by mouth.      mupirocin (BACTROBAN) 2 % ointment 3 (three) times daily. Apply to affected area      pantoprazole (PROTONIX) 40 MG tablet Take 1 tablet (40 mg total) by mouth once daily. 90 tablet 3    sulfaSALAzine (AZULFIDINE) 500 MG EC tablet Take 1 tablet (500 mg total) by mouth once daily. 30 tablet 4    SURE COMFORT INSULIN SYRINGE 1 mL 29 gauge x 1/2" Syrg       tofacitinib (XELJANZ XR) 11 mg Tb24 Take 1 tablet by mouth once daily. 30 tablet 4     Review of Systems   Review of Systems   Constitutional:  Negative for activity change, appetite change, fever and unexpected weight change.   HENT:  Negative for dental problem, hearing loss, sneezing, sore throat, trouble swallowing and voice change.    Eyes:  Negative for visual disturbance.   Respiratory:  Negative for choking, chest tightness, shortness of breath and stridor.    Cardiovascular:  Negative for chest pain and palpitations.   Gastrointestinal:  Negative for abdominal pain, anal bleeding, blood in stool, change in bowel habit, nausea, vomiting, fecal incontinence and change in bowel habit.   Endocrine: Negative for polydipsia, polyphagia and polyuria.   Genitourinary:  Negative for decreased urine volume, difficulty urinating, dysuria, frequency, hematuria and urgency.   Musculoskeletal:  Positive for arthralgias and back pain. Negative for joint swelling, neck pain and neck stiffness.   Integumentary:  Negative for color change, pallor, rash, wound and mole/lesion.   Allergic/Immunologic: Negative for immunocompromised state.   Neurological:  Negative for vertigo, seizures, syncope, weakness, light-headedness, coordination difficulties and coordination difficulties.   Hematological:  Negative for adenopathy. Does not bruise/bleed easily.   Psychiatric/Behavioral:  Negative for agitation, behavioral problems, " "confusion, hallucinations, sleep disturbance and suicidal ideas.        Objective:    VITALS  BP Readings from Last 1 Encounters:   11/17/22 (!) 151/97   Weight: (!) 150.1 kg (331 lb)   Height: 5' 7" (170.2 cm)   BMI Readings from Last 1 Encounters:   11/17/22 51.84 kg/m²       Physical Exam  Vitals reviewed.   Constitutional:       General: She is not in acute distress.     Appearance: Normal appearance. She is not ill-appearing, toxic-appearing or diaphoretic.   HENT:      Head: Normocephalic and atraumatic.      Right Ear: External ear normal.      Left Ear: External ear normal.      Nose: Nose normal. No congestion or rhinorrhea.   Eyes:      General:         Right eye: No discharge.         Left eye: No discharge.   Cardiovascular:      Rate and Rhythm: Normal rate.   Pulmonary:      Effort: Pulmonary effort is normal. No respiratory distress.      Breath sounds: No stridor.   Musculoskeletal:         General: Normal range of motion.      Cervical back: Normal range of motion and neck supple.      Right lower leg: No edema.      Left lower leg: No edema.   Skin:     General: Skin is warm and dry.      Coloration: Skin is not jaundiced or pale.      Findings: No rash.   Neurological:      General: No focal deficit present.      Mental Status: She is alert and oriented to person, place, and time.      Gait: Gait normal.   Psychiatric:         Mood and Affect: Mood normal.         Behavior: Behavior normal.         Thought Content: Thought content normal.         Judgment: Judgment normal.       Assessment:      1. Acute bilateral low back pain with bilateral sciatica       Plan:   Acute bilateral low back pain with bilateral sciatica  -     LIDOcaine (LIDODERM) 5 %; Place 1 patch onto the skin once daily. Remove & Discard patch within 12 hours or as directed by MD  Dispense: 90 patch; Refill: 1  -     Ambulatory referral/consult to Orthopedics; Future; Expected date: 11/24/2022  Will try lidocaine patches. " Continue follow ups with Dr. Asencio. Continue physical therapy. Will seek second ortho opinion. Referral faxed today. Patient instructed to contact our office if a call has not received from the facility to schedule an appointment within the next 2 weeks.     Patient instructed to contact the clinic should any questions or concerns arise prior to next office visit. Risks, benefits, and alternatives discussed with patient. Patient verbalized understanding of discussed plan of care. Asked patient if any further questions, answered no. Follow up as discussed or sooner PRN.

## 2022-11-21 ENCOUNTER — PATIENT MESSAGE (OUTPATIENT)
Dept: ADMINISTRATIVE | Facility: HOSPITAL | Age: 47
End: 2022-11-21
Payer: MEDICARE

## 2022-11-22 DIAGNOSIS — Z12.11 SCREENING FOR COLON CANCER: ICD-10-CM

## 2022-12-22 RX ORDER — BUDESONIDE AND FORMOTEROL FUMARATE DIHYDRATE 80; 4.5 UG/1; UG/1
2 AEROSOL RESPIRATORY (INHALATION)
Status: CANCELLED | OUTPATIENT
Start: 2022-12-22

## 2023-01-20 NOTE — PROGRESS NOTES
Subjective:      Patient ID: Lilly Loera is a 47 y.o. female.    Chief Complaint: Disease Management    HPI:   Includes joint pain with subjective swelling.   Antecedent event includes: None;  Pain location includes: hip, knees, ankles, neck, and back;  Gradual onset; beginning >years ago;  Constant ache, Moderate in severity,   Lasting >minutes, Worse at all times;   Improved with rest, medication, change in position, and none;   Worsened with activity, overuse, stress, and rest;         Review of Systems   Constitutional:  Positive for fatigue. Negative for fever and unexpected weight change.   HENT:  Negative for mouth dryness, mouth sores and trouble swallowing.    Eyes:  Negative for redness.   Respiratory:  Negative for cough and shortness of breath.    Cardiovascular:  Negative for chest pain.   Gastrointestinal:  Negative for constipation and diarrhea.   Genitourinary:  Negative for dysuria, genital sores and nocturia.   Musculoskeletal:  Positive for arthralgias, joint swelling and myalgias.   Integumentary:  Negative for rash.   Neurological:  Positive for memory loss. Negative for headaches.   Hematological:  Does not bruise/bleed easily.   Psychiatric/Behavioral:  Positive for sleep disturbance. The patient is nervous/anxious.     Past Medical History:   Diagnosis Date    Anemia     Anxiety     Arthritis     CHF (congestive heart failure)     Depression     Hidradenitis suppurativa     Hypertension     Polyneuropathy      Past Surgical History:   Procedure Laterality Date    club foot correction      gall blader removed      GASTRIC BYPASS      gastric sleeve      HERNIA REPAIR      HYSTERECTOMY      partial    meniscus repair surgery      repaired ac1      under arm graphs        See the Assessment/Plan for further characterization of the HPI, ROS, Medical, Surgical, Family, and Social Histories including Tobacco use, Meds; all of which has been reviewed in Epic.    Medication List with  "Changes/Refills   Current Medications    BUDESONIDE-FORMOTEROL 80-4.5 MCG (SYMBICORT) 80-4.5 MCG/ACTUATION HFAA    Inhale 2 puffs into the lungs. Controller    BUPROPION HCL, SMOKING DETER, (ZYBAN) 150 MG TBSR 12 HR TABLET    Take 1 tablet (150 mg total) by mouth every 12 (twelve) hours.    BUSPIRONE (BUSPAR) 15 MG TABLET    Pt is to take 1/3 (5 mg) or 1/2 (7.5mgs) or one po HS PRN    CALCIUM CARBONATE (OS-MICHELLE) 500 MG CALCIUM (1,250 MG) CHEWABLE TABLET    Take 1 tablet by mouth once daily.    CHOLECALCIFEROL, VITAMIN D3, 125 MCG (5,000 UNIT) TAB        CYANOCOBALAMIN 1,000 MCG/ML INJECTION    Inject 1,000 mcg as directed.    LIDOCAINE (LIDODERM) 5 %    Place 1 patch onto the skin once daily. Remove & Discard patch within 12 hours or as directed by MD    METOPROLOL SUCCINATE (TOPROL-XL) 50 MG 24 HR TABLET    Take 50 mg by mouth.    MULTIVITAMIN CAPSULE    Take 1 capsule by mouth once daily.    MUPIROCIN (BACTROBAN) 2 % OINTMENT    3 (three) times daily. Apply to affected area    PANTOPRAZOLE (PROTONIX) 40 MG TABLET    Take 1 tablet (40 mg total) by mouth once daily.    SACUBITRIL-VALSARTAN (ENTRESTO)  MG PER TABLET    Take 1 tablet by mouth 2 (two) times daily.    SURE COMFORT INSULIN SYRINGE 1 ML 29 GAUGE X 1/2" SYRG       Changed and/or Refilled Medications    Modified Medication Previous Medication    GABAPENTIN (NEURONTIN) 300 MG CAPSULE gabapentin (NEURONTIN) 300 MG capsule       Take 1 capsule (300 mg total) by mouth 3 (three) times daily.    Take 1 capsule (300 mg total) by mouth 3 (three) times daily.    SULFASALAZINE (AZULFIDINE) 500 MG EC TABLET sulfaSALAzine (AZULFIDINE) 500 MG EC tablet       Take 1 tablet (500 mg total) by mouth once daily.    Take 1 tablet (500 mg total) by mouth once daily.    TOFACITINIB (XELJANZ XR) 11 MG TB24 tofacitinib (XELJANZ XR) 11 mg Tb24       Take 1 tablet by mouth once daily.    Take 1 tablet by mouth once daily.    TRAMADOL (ULTRAM) 50 MG TABLET traMADoL (ULTRAM) 50 " "mg tablet       Take 1 tablet (50 mg total) by mouth 3 (three) times daily as needed (moderate to severe pain).    Take 2 tablets (100 mg total) by mouth 3 (three) times daily as needed (moderate to severe pain).   Discontinued Medications    CYANOCOBALAMIN-COBAMAMIDE (B12) 5,000-100 MCG LOZG        DULOXETINE (CYMBALTA) 30 MG CAPSULE    Take 30 mg by mouth.    IBUPROFEN (ADVIL,MOTRIN) 800 MG TABLET    Take 1 tablet (800 mg total) by mouth every 6 (six) hours as needed for Pain.         Objective:   BP (!) 158/102   Pulse 64   Resp 18   Ht 5' 7" (1.702 m)   Wt (!) 145.7 kg (321 lb 4.8 oz)   SpO2 99%   BMI 50.32 kg/m²   Physical Exam  Non-toxic appearance. No distress.   Normocephalic and atraumatic.   External ears normal.    Conjunctivae and EOM are normal. No scleral icterus.   RRR, No friction rub; palpable distal pulses.    No tachypnea or signs of respiratory distress.   Abdominal: No guarding or rebound tenderness.   Neurological: Oriented. Normal thought content.   Skin is warm. No pallor.   Musculoskeletal: see below for further input.     [facility] UNKNOWN RAD EAP                                RADIOLOGY REPORT        PT NAME: CAMI SANDERS      [facility]     : 1975 F 47             [address]    ACCT: [id number]                                              [facility] Rec #: [id number]                                        [id number]    Patient Location: [place]RAD[facility]/             Procedure: GLENIS SCREEN INDIRA W CAD RHINA    REQUISITION #: [id number]      REPORT #: [id number]           DATE OF EXAM: 22    TIME OF EXAM: 0918       CMS MANDATED QUALITY DATA - MAMMOGRAPHY - 225        This [facility] utilizes a reminder system to ensure that all   patients receive reminder letters. This includes reminders for routine   screening mammograms, diagnostic mammograms or other breast imaging studies    or interventions where appropriate. This patient's information " will be   entered into the appropriate reminder system.                BILATERAL DIGITAL SCREENING MAMMOGRAPHY            HISTORY: Screening, Z 12.31        TECHNIQUE: Bilateral digital CC and MLO views obtained. 3-D tomosynthesis   imaging. CAD utilized during film review.        COMPARISON: 1/18/2021    Breast density: Scattered fibroglandular densities.            FINDINGS:  There is no dominant mass. There are no suspicious calcifications   and there are no areas of architectural distortion. Overall no change from   prior studies.        Assessment: BI-RADS Category 1, negative.        Recommendation: Routine mammography in one year.        P1 17.44%        Based on ACR recommendations, the patient's lifetime risk for developing   breast cancer has been assessed today. There are number of models available,   but at [facility] we employ the Tyrer-Winsted model. Tyrer-Winsted is a   comprehensive model that takes a number of factors into consideration to   help determine risk including: parity, height, weight, hormone replacement   therapy, age of menopause, age of childbirth, breast biopsy, and family   history. All of these factors are weighted into a calculator to produce this   value. Those patients whose lifetime risk is 20% or greater should consider    adding a breast MRI to annual mammogram screening.        DICTATING PHYSICIAN:  GARRETT LOUIS MD                   Date Dictated: 09/27/22 1535        Signed By:  GARRETT LOUIS MD <Electronically signed by GARRETT LOUIS MD in OV>    Date Signed:  09/27/22 1536     CC: CHANTEL POST MD ; CHANTEL POST MD      ADMITTING PHYSICIAN:                                                                                                    ORDERING PHY: CHANTEL POST MD                                                                                                                                                      ATTENDING PHY: CHANTEL POST MD    Patient Status:   REG CLI    Admit Service Date: 09/27/22         Orders Only on 10/31/2022   Component Date Value Ref Range Status    CPK 10/31/2022 113  26 - 192 U/L Final    Comment: NOTE  Testing performed at:  [facility], [address] [place], [place] [id number] CLIA #:[id number]      C3 Complement 10/31/2022 100  90 - 180 mg/dL Final    Comment: NOTE  Testing performed at:  [facility], [address] [place], [place] [id number] CLIA #:[id number]      C4 Complement 10/31/2022 16  10 - 40 mg/dL Final    Comment: NOTE  Testing performed at:  [facility], [address] [place] [address] CLIA #:[id number]      Glucose 10/31/2022 82  74 - 106 mg/dL Final    BUN 10/31/2022 22.4 (H)  6 - 20 mg/dL Final    Creatinine 10/31/2022 1.36 (H)  0.50 - 0.90 mg/dL Final    Recommend repeat creatinine within 90 days.    AST 10/31/2022 24  0 - 32 U/L Final    ALT (SGPT) 10/31/2022 12  0 - 33 U/L Final    Alkaline Phosphatase 10/31/2022 75  35 - 105 U/L Final    Calcium 10/31/2022 9.1  8.6 - 10.2 mg/dL Final    Protein, Total 10/31/2022 7.0  6.4 - 8.3 g/dL Final    Albumin 10/31/2022 4.0  3.5 - 5.2 g/dL Final    BILIRUBIN, TOTAL 10/31/2022 0.49  0.00 - 1.20 mg/dL Final    Sodium 10/31/2022 139  136 - 145 mmol/L Final    Potassium 10/31/2022 4.3  3.5 - 5.1 mmol/L Final    Chloride 10/31/2022 103  98 - 107 mmol/L Final    CO2 10/31/2022 29  22 - 29 mmol/L Final    Globulin 10/31/2022 3.0  1.5 - 4.5 g/dL Final    Albumin/Globulin Ratio 10/31/2022 1.3  1.0 - 2.7 Final    BUN/Creatinine Ratio 10/31/2022 16.5  6 - 20 Final    GFR ESTIMATION 10/31/2022 48.95 (L)  >60.00 Final    Anion Gap 10/31/2022 7.0 (L)  8.0 - 17.0 mmol/L Final    Comment: NOTE  Testing performed at:  [facility], [address] [place] [address] CLIA #:[id number]      CRP QUANTITATIVE 10/31/2022 3.5  <5.0 mg/L Final    Comment: Significantly decreased CRP values may be obtained from samples taken  from patients who have  been treated with carboxypenicillins.      CRP QUALITATIVE 10/31/2022 NEGATIVE  NEGATIVE mg/L Final    Comment: NOTE  Testing performed at:  [facility], [address] [place]  [address] CLIA #:[id number]      [facility] 10/31/2022 10  0 - 20 mm/hr Final    Comment: NOTE  Testing performed at:  [facility], [address] [place]  [address] CLIA #:[id number]      WBC 10/31/2022 7.70  4.3 - 10.8 X 10 3/ul Final    RBC 10/31/2022 3.71 (L)  4.2 - 5.4 X 10 6/ul Final    RDW-SD 10/31/2022 48.6  37 - 54 fl Final    Hemoglobin 10/31/2022 11.6 (L)  12 - 16 g/dL Final    Hematocrit 10/31/2022 36.0 (L)  37 - 47 % Final    MCV 10/31/2022 97.0  82 - 100 fl Final    MCH 10/31/2022 31.3  27 - 32 pg Final    MCHC 10/31/2022 32.2  32 - 36 g/dL Final    Platelets 10/31/2022 271  135 - 400 X 10 3/ul Final    Neutrophils 10/31/2022 55.1  34 - 71.1 % Final    Lymphocytes 10/31/2022 36.5  19.3 - 53.1 % Final    Monocytes 10/31/2022 5.8  4.7 - 12.5 % Final    Eosinophils 10/31/2022 1.9  0.7 - 7.0 % Final    Basophils 10/31/2022 0.3  0.2 - 1.2 % Final    Neutrophils Absolute 10/31/2022 4.24  2.15 - 7.56 X 10 3/ul Final    Lymphocytes Absolute 10/31/2022 2.81  0.86 - 4.75 X 10 3/ul Final    Monocytes Absolute 10/31/2022 0.45  0.22 - 1.08 X 10 3/ul Final    Eosinophils Absolute 10/31/2022 0.15  0.04 - 0.54 X 10 3/ul Final    Basophils Absolute 10/31/2022 0.02  0.00 - 0.22 X 10 3/ul Final    Immature Granulocytes Absolute 10/31/2022 0.03  0 - 0.04 X 10 3/ul Final    Immature Granulocytes 10/31/2022 0.4  0 - 0.5 % Final    IG includes metamyelocytes, myelocytes, and promyelocytes    nRBC# 10/31/2022 0.0  0 - 0.2 /100 WBC Final    nRBC Count Absolute 10/31/2022 0.000  0 - 0.012 x 10 3/ul Final    Comment: NOTE  Testing performed at:  [facility], [address] [place] [address]  [address] CLIA #:[id number]      WBC/HPF 10/31/2022 0-5  <5 Final    RBC/HPF 10/31/2022 0-5  <5 Final     Amorphous, UA 10/31/2022 NEGATIVE   Final    Bacteria, UA 10/31/2022 2+ (A)  NEG-TRACE Final    Epithelial Cells 10/31/2022 MODERATE (A)  NEGATIVE-FEW Final    Mucus, UA 10/31/2022 2+ (A)  NEGATIVE Final    Hyaline Casts, UA 10/31/2022 0-2 PER LPF   Final    Comment: NOTE  Testing performed at:  [facility], [address] [place] [address] CLIA #:[id number]      Color, UA 10/31/2022 YELLOW   Final    Clarity, UA 10/31/2022 HAZY   Final    Specific Gravity,UA 10/31/2022 1.020  1.005 - 1.030 Final    pH, Urine 10/31/2022 5  5 - 7.5 Final    Leukocytes, UA 10/31/2022 SMALL (A)  NEGATIVE Final    Nitrite, Urine 10/31/2022 NEGATIVE  NEGATIVE Final    Protein, UA 10/31/2022 25 (H)  NEGATIVE mg/dL Final    Glucose, UA 10/31/2022 NEGATIVE  NEGATIVE mg/dL Final    Ketones, UA 10/31/2022 TRACE (A)  NEGATIVE mg/dL Final    Urobilinogen, urine 10/31/2022 4.0 (H)  0 - 1.0 E.U./dL Final    Bilirubin (UA) 10/31/2022 MODERATE (A)  NEGATIVE Final    Occult Blood 10/31/2022 NEGATIVE  NEGATIVE Final    Comment: NOTE  Testing performed at:  [facility], [address] [place] [address] CLIA #:[id number]      AMBROCIO 10/31/2022 NEGATIVE  NEGATIVE Final    Comment: AMBROCIO testing performed using IFA (indirect fluorescent antibody).  NOTE  Testing performed at:  [facility], [address] [place] [address] CLIA #:[id number]     Orders Only on 07/29/2022   Component Date Value Ref Range Status    QuantiFERON-TB Gold Plus 07/29/2022 NEGATIVE  Negative Final    Comment: Interpretive Data: Quantiferon TB Gold PlusInterferon gamma release is measured for specimens from each ofthe four collection tubes. A qualitative result (Negative,Positive, or Indeterminate) is based on interpretation of thefour values, NIL, MITOGEN   minus NIL (MITOGEN-NIL), TB1 minus NIL(TB1-NIL), and TB2 minus NIL (TB2-NIL). The NIL value representsnonspecific reactivity produced by the patient specimen. TheMITOGEN-NIL  value serves as the positive control for the patientspecimen, demonstrating   successful lymphocyte activity. TheTB1-NIL tube specifically detects CD4+ lymphocyte reactivity,specifically stimulated by the TB1 antigens. The TB2-NIL tubedetects both CD4+ and CD8+ lymphocyte reactivity, stimulated byTB2 antigens. An overall Negative   result does not completelyrule out TB infection.A false-positive result in the absence of other clinicalevidence of TB infection is not uncommon. Refer to: UpdatedGuidelines for Using Interferon Gamma Release Assays to DetectMycobacterium                            tuberculosis   Infection --- [place], 2010(http://www.cdc.gov/mmwr/preview/mmwrhtml/lu4655r0.htm), formore information concerning test performance in low-prevalencepopulations and use in occupational screening.      QuantiFERON TB1 NIL 07/29/2022 0.00  0.00 - 0.34 IU/mL Final    QuantiFERON TB2 NIL 07/29/2022 0.00  0.00 - 0.34 IU/mL Final    QuantiFERON Mitogen Minus NIL 07/29/2022 >10.00  IU/mL Final    QuantiFERON TB NIL 07/29/2022 0.02  IU/mL Final    Performed By: [facility] [place] [id number] Director: Mario Saab MD, PhD   Orders Only on 07/29/2022   Component Date Value Ref Range Status    Hepatitis B Core Ab Total 07/29/2022 NEGATIVE  Negative Final    Comment: INTERPRETIVE INFORMATION: Hepatitis B Core Ab (Total)This assay should not be used for blood donor screening,associated re-entry protocols, or for screening Human Cells,Tissues and Cellular and Tissue-Based Products (HCT/P).Performed By: [facility]   [address] [place] [id number] Director: Mario Saab MD, PHD      C3 Complement 07/29/2022 113  90 - 180 mg/dL Final    Comment: REFERENCE INTERVAL: Complement Component 3Access complete set of age- and/or gender-specific referenceintervals for this test in the [facility] Test Directory([email or web address]).Performed By: [facility]  [address]   [id number] Director: Mario Saab MD, PhD      C4 Complement 07/29/2022 21  10 - 40 mg/dL Final    Comment: REFERENCE INTERVAL: Complement Component 4Access complete set of age- and/or gender-specific referenceintervals for this test in the [facility] Test Directory([email or web address]).Performed By: [address]   [id number] Director: Mario Saab MD, PhD      AMBROCIO 07/29/2022 NONE DETECTED  None Detected Final    Comment: If suspicion of connective tissue disease is strong and AMBROCIO EIAis negative, consider testing for AMBROCIO by IFA ([id number]).No antibodies to Anti-Nuclear Antibodies (AMBROCIO) detected.  TheExtractable Nuclear Antigen Antibodies (RNP, Perales, SSA 52, SSA60,   Scleroderma, Sharona-1 and SSB) and Double Stranded DNA (dsDNA)Antibody, IgG will not be performed.INTERPRETIVE INFORMATION: Anti-Nuclear Antibodies (AMBROCIO), IgG byELISAAntinuclear Antibodies (AMBROCIO), IgG by ROEL: AMBROCIO specimens arescreened using enzyme-linked   immunosorbent assay (ROEL)methodology. All ROEL results reported as Detected are furthertested by indirect fluorescent assay (IFA) using HEp-2 substratewith an IgG-specific conjugate. The AMBROCIO ROEL screen is designedto detect antibodies against dsDNA,   histones, SS-A (Ro), SS-B(La), Perales, Smith/RNP, Scl-70, Sharona-1, centromeric proteins,other antigens extracted from the HEp-2 cell nucleus. AMBROCIO ELISAassays have been reported to have lower sensitivities than ANAIFA for systemic autoimmune rheum                           atic   diseases (SARD).Negative results do not necessarily rule out SARD.Performed By: [facility] [address] [place] [id number] Director: Mario Saab MD, PhD      Rheumatoid Factor 07/29/2022 <10  0 - 14 IU/mL Final    Performed By: [facility] [address] [place] [id number] Director: Mario Saab MD, PhD    Cyclic  Citrullinated Peptide (CCP)* 07/29/2022 2  0 - 19 Units Final    Comment: INTERPRETIVE INFORMATION: Cyclic Citrullinated Peptide Antibody,IgG  19 Units or less ................... Negative  20-39 Units ........................ Weak Positive  40-59 Units ........................ Moderate Positive  60 Units or greater   ................ Strong PositiveAnti-cyclic citrullinated peptide (anti-CCP), IgG antibodies arepresent in about 69-83 percent of patients with rheumatoidarthritis (RA) and have specificities of 93-95 percent. Theseautoantibodies may be present in the   preclinical phase ofdisease, are associated with future RA development, and maypredict radiographic joint destruction. Patients with weakpositive results should be monitored and testing repeated.Performed By: [facility] [place], [place] [address]Edrkldmggu Director: Mario Saab MD, PhD      Total Protein, Electrophoresis 07/29/2022 6.8  6.3 - 8.2 g/dL Final    Albumin, Electrophoresis 07/29/2022 3.61 (A)  3.75 - 5.01 g/dL Final    Alpha-1-Globulin 07/29/2022 0.29  0.19 - 0.46 g/dL Final    Alpha-2-Globulin 07/29/2022 0.65  0.48 - 1.05 g/dL Final    Beta Globulin 07/29/2022 0.83  0.48 - 1.10 g/dL Final    Gamma Globulin 07/29/2022 1.42  0.62 - 1.51 g/dL Final    Immunofix Interp. 07/29/2022 SEE NOTE   Final    Normal SPEP pattern.  Immunofixation electrophoresis (LUCHO)is a more sensitive technique for the identification ofsmall M-proteins.    EER Protein Electrophoresis 07/29/2022 SEE NOTE   Final    Authorized individuals can access the [facility] Report using the following link:[email or web address] By: [address] [id number] Director: Mario Saab MD, PhD   Orders Only on 07/29/2022   Component Date Value Ref Range Status    Hep B S Ab 07/29/2022 Nonreactive  Nonreactive Final    Hepatitis B Surface Ag 07/29/2022 Nonreactive  Nonreactive  Final    Hepatitis C Ab 07/29/2022 Nonreactive  Nonreactive Final   Orders Only on 07/29/2022   Component Date Value Ref Range Status    WBC 07/29/2022 6.2  4.6 - 10.2 10*3/uL Final    RBC 07/29/2022 3.96 (L)  4.2 - 5.4 10*6/uL Final    Hemoglobin 07/29/2022 12.3  12.0 - 16.0 g/dL Final    Hematocrit 07/29/2022 37.9  37.0 - 47.0 % Final    MCV 07/29/2022 95.7  80 - 97 fL Final    MCH 07/29/2022 31.1  27.0 - 31.2 pg Final    MCHC 07/29/2022 32.5  31.8 - 35.4 g/dL Final    RDW RBC Auto-Rto 07/29/2022 13.6  12.5 - 18.0 % Final    Platelets 07/29/2022 274  142 - 424 10*3/uL Final    Neutrophils 07/29/2022 54.5  37 - 80 % Final    Lymphocytes 07/29/2022 36.4  25 - 40 % Final    Monocytes 07/29/2022 6.2  1 - 15 % Final    Eosinophils 07/29/2022 2.3  1 - 3 % Final    Basophils 07/29/2022 0.3  0 - 3 % Final    nRBC# 07/29/2022 0.0  % Final    Neutrophils Absolute 07/29/2022 3.35  1.8 - 7.7 10*3/uL Final    Sed Rate 07/29/2022 21 (H)  0 - 20 mm/hr Final   Orders Only on 07/29/2022   Component Date Value Ref Range Status    Sodium 07/29/2022 140  135 - 145 mmol/L Final    Potassium 07/29/2022 4.2  3.6 - 5.2 mmol/L Final    Chloride 07/29/2022 107  100 - 108 mmol/L Final    CO2 07/29/2022 29  21 - 32 mmol/L Final    Anion Gap 07/29/2022 4.0  3.0 - 11.0 mmol/L Final    BUN 07/29/2022 19 (H)  7 - 18 mg/dL Final    Creatinine 07/29/2022 0.89  0.55 - 1.02 mg/dL Final    GFR ESTIMATION 07/29/2022 > 60  >60 Final               DESCRIPTION       GLOMERULAR FILTRATION RATE             NORMAL                     >60             KIDNEY  DISEASE           15-60             KIDNEY FAILURE             <15    BUN/Creatinine Ratio 07/29/2022 21.34 (H)  7 - 18 Ratio Final    Glucose 07/29/2022 93  70 - 110 mg/dL Final    Calcium 07/29/2022 8.9  8.8 - 10.5 mg/dL Final    Total Bilirubin 07/29/2022 0.6  0.0 - 1.0 mg/dL Final    AST 07/29/2022 20  15 - 37 U/L Final    ALT 07/29/2022 16  12 - 78 U/L Final    Total Protein 07/29/2022 7.3  6.4 -  8.2 g/dL Final    Albumin 07/29/2022 3.5  3.4 - 5.0 g/dL Final    Globulin 07/29/2022 3.8 (H)  2.3 - 3.5 g/dL Final    Albumin/Globulin Ratio 07/29/2022 0.921 (L)  1.1 - 1.8 Ratio Final    Alkaline Phosphatase 07/29/2022 71  46 - 116 U/L Final    Uric Acid 07/29/2022 5.6  2.6 - 6.0 mg/dL Final    CK, Serum 07/29/2022 64  26 - 308 U/L Final    CRP QUANTITATIVE 07/29/2022 0.3  0.0 - 0.9 mg/dL Final   Orders Only on 07/29/2022   Component Date Value Ref Range Status    Specimen Collection Method 07/29/2022 Void   Final    Color, UA 07/29/2022 Yellow  Yellow Final    Appearance, UA 07/29/2022 Very Cloudy (A)  Clear Final    pH, UA 07/29/2022 6.0  5.0 - 9.0 pH Final    Specific Gravity, UA 07/29/2022 1.020  1.000 - 1.030 Final    Protein, UA 07/29/2022 Trace (A)  Negative mg/dL Final    Glucose, UA 07/29/2022 Normal  Normal mg/dL Final    Ketones, UA 07/29/2022 Negative  Negative mg/dL Final    Occult Blood UA 07/29/2022 25 (A)  Negative /uL Final    Nitrite, UA 07/29/2022 Negative  Negative Final    Bilirubin (UA) 07/29/2022 Negative  Negative mg/dL Final    Urobilinogen, UA 07/29/2022 Normal  Normal mg/dL Final    Leukocytes, UA 07/29/2022 Negative  Negative /uL Final    RBC, UA 07/29/2022 0-2  0 - 2 /HPF Final    WBC, UA 07/29/2022 None  0 - 5 /HPF Final    Squam Epithel, UA 07/29/2022 2+ Moderate  /LPF Final    Bacteria 07/29/2022 1+ Few  Few/HPF /HPF Final    Service Comment 03 07/29/2022 No, Criteria Not Met   Final   Office Visit on 02/11/2022   Component Date Value Ref Range Status    WBC 02/11/2022 6.9  3.8 - 10.8 Thousand/uL Final    RBC 02/11/2022 4.15  3.80 - 5.10 Million/uL Final    Hemoglobin 02/11/2022 12.9  11.7 - 15.5 g/dL Final    Hematocrit 02/11/2022 39.9  35.0 - 45.0 % Final    MCV 02/11/2022 96.1  80.0 - 100.0 fL Final    MCH 02/11/2022 31.1  27.0 - 33.0 pg Final    MCHC 02/11/2022 32.3  32.0 - 36.0 g/dL Final    RDW 02/11/2022 12.1  11.0 - 15.0 % Final    Platelets 02/11/2022 298  140 - 400  Thousand/uL Final    MPV 02/11/2022 10.5  7.5 - 12.5 fL Final    Neutrophils, Abs 02/11/2022 4,388  1,500 - 7,800 cells/uL Final    Lymph # 02/11/2022 1,925  850 - 3,900 cells/uL Final    Mono # 02/11/2022 449  200 - 950 cells/uL Final    Eos # 02/11/2022 117  15 - 500 cells/uL Final    Baso # 02/11/2022 21  0 - 200 cells/uL Final    Neutrophils Relative 02/11/2022 63.6  % Final    Lymph % 02/11/2022 27.9  % Final    Mono % 02/11/2022 6.5  % Final    Eosinophil % 02/11/2022 1.7  % Final    Basophil % 02/11/2022 0.3  % Final    Glucose 02/11/2022 95  65 - 99 mg/dL Final    Comment:               Fasting reference interval         BUN 02/11/2022 19  7 - 25 mg/dL Final    Creatinine 02/11/2022 0.91  0.50 - 1.10 mg/dL Final    eGFR if non African American 02/11/2022 77  > OR = 60 mL/min/1.73m2 Final    eGFR if African American 02/11/2022 89  > OR = 60 mL/min/1.73m2 Final    BUN/Creatinine Ratio 02/11/2022 NOT APPLICABLE  6 - 22 (calc) Final    Sodium 02/11/2022 140  135 - 146 mmol/L Final    Potassium 02/11/2022 4.1  3.5 - 5.3 mmol/L Final    Chloride 02/11/2022 105  98 - 110 mmol/L Final    CO2 02/11/2022 29  20 - 32 mmol/L Final    Calcium 02/11/2022 9.5  8.6 - 10.2 mg/dL Final    Total Protein 02/11/2022 7.3  6.1 - 8.1 g/dL Final    Albumin 02/11/2022 4.0  3.6 - 5.1 g/dL Final    Globulin, Total 02/11/2022 3.3  1.9 - 3.7 g/dL (calc) Final    Albumin/Globulin Ratio 02/11/2022 1.2  1.0 - 2.5 (calc) Final    Total Bilirubin 02/11/2022 1.0  0.2 - 1.2 mg/dL Final    Alkaline Phosphatase 02/11/2022 73  31 - 125 U/L Final    AST 02/11/2022 19  10 - 30 U/L Final    ALT 02/11/2022 12  6 - 29 U/L Final    TSH 02/11/2022 1.27  mIU/L Final    Comment:           Reference Range                         > or = 20 Years  0.40-4.50                              Pregnancy Ranges            First trimester    0.26-2.66            Second trimester   0.55-2.73            Third trimester    0.43-2.91      Cholesterol 02/11/2022 155  <200  mg/dL Final    HDL 02/11/2022 52  > OR = 50 mg/dL Final    Triglycerides 02/11/2022 68  <150 mg/dL Final    LDL Cholesterol 02/11/2022 88  mg/dL (calc) Final    Comment: Reference range: <100     Desirable range <100 mg/dL for primary prevention;    <70 mg/dL for patients with CHD or diabetic patients   with > or = 2 CHD risk factors.     LDL-C is now calculated using the Clint-Haro   calculation, which is a validated novel method providing   better accuracy than the Friedewald equation in the   estimation of LDL-C.   Clint ZACARIAS et al. JEFFERY. 2013;310(19): 4122-5852   (http://education.[employer].eCozy/faq/GZB879)      HDL/Cholesterol Ratio 02/11/2022 3.0  <5.0 (calc) Final    Non HDL Chol. (LDL+VLDL) 02/11/2022 103  <130 mg/dL (calc) Final    Comment: For patients with diabetes plus 1 major ASCVD risk   factor, treating to a non-HDL-C goal of <100 mg/dL   (LDL-C of <70 mg/dL) is considered a therapeutic   option.      Vitamin B-12 02/11/2022 >2000 (H)  200 - 1100 pg/mL Final        All of the data above and below has been reviewed by myself and any further interpretations will be reflected in the assessment and plan.   The data includes review of external notes, and independent interpretation of lab results, x-rays, and imaging reports.  Active inflammatory arthritis is an illness that poses a threat to bodily function and even a threat to life in some cases.  Drug therapy to treat inflammatory arthritis usually requires intensive monitoring for toxicity.    Review of patient's allergies indicates:   Allergen Reactions    Adhesive Blisters, Hives, Itching and Swelling    Adhesive tape-silicones      whelp and redness     Assessment/Plan:          Prev noted/prior plan:   (No overt synovitis  Deformities including ankles feet  DJD  Edema in feet lower legs  Hyperpigmentation skin changes noted        Verbal education        Blood work and xrays to further evaluate     We will seek records from Dr. Felix Cabrera  will seek records from Dermatology Dr. Samuel and she tells me she is working on getting back with Dermatology      She will consider also working with Orthopedics and Physical Medicine and Rehab MD if needed     Cont xeljanz 11mg daily     ibuprofen 800mg prn     Revisit weeks      Contact us prn)     Visit prior to Last visit:     Interim lab:            Lab Results   Component Value Date      07/29/2022     K 4.2 07/29/2022      07/29/2022     CO2 29 07/29/2022     ANIONGAP 4.0 07/29/2022     GLU 93 07/29/2022     CALCIUM 8.9 07/29/2022     BUN 19 (H) 07/29/2022     CREATININE 0.89 07/29/2022     TSH 1.27 02/11/2022     PROT 7.3 07/29/2022     AST 20 07/29/2022     ALT 16 07/29/2022     BILITOT 0.6 07/29/2022     ALKPHOS 71 07/29/2022     WBC 6.2 07/29/2022     NEUTROPHILS 54.5 07/29/2022     HGB 12.3 07/29/2022     MCV 95.7 07/29/2022      02/11/2022     LABPLAT 274 07/29/2022     SEDRATE 21 (H) 07/29/2022     WBCUA None 07/29/2022     RBCUA 0-2 07/29/2022     BACTERIA 1+ Few 07/29/2022     PROTEINUA Trace (A) 07/29/2022         CPK 64     SPEP normal        AMBROCIO neg RF neg CCP neg HepBsAgAbcoreAbneg HCV neg uric acid 5.6        ESR 21 CRP 0.3        Interim 7/29/22 xray reports:  CXR COMPARISON:  April 6, 2021    FINDINGS:  Cardiovascular: Normal.    Lungs and pleura: Normal.    Mediastinal and hilar structures: Normal.    Osseous structures: Normal.         lumbar spine  Anterior osteophytic spicules seen off the vertebrae from L3 to S1.    Disc spaces: Normal.    Soft tissues: Normal.    Additional findings: None seen.    IMPRESSION:    1.  Mild degenerative changes.           cervical spine COMPARISON:  June 11, 2013    Anterior osteophytic spicules seen off the vertebrae from C5 to C7.   C1 and C2: Normal.    Soft tissues: Normal.    Additional findings: None seen.    IMPRESSION:    1.  Mild degenerative changes.           right hand  small radiopaque foreign body in the distal aspect of  the second digit. This has the appearance of a broken off sewing needle.    Additional findings: None seen.    Small radiopaque foreign body in the second digit.           Hips/pelvis normal     right knee  loss of joint space in all 3 compartments with irregularity the  articular surface and marginal osteophyte formation off of all 3 bony elements.    Soft tissues: Normal.    Additional findings: None seen.    IMPRESSION:    1.  Osteoarthritis.           left knee  loss of joint space in all 3 compartments most notably in the lateral compartment. There is irregularity the articular surface and subchondral sclerosis. Marginal osteophyte formation is seen off of all 3 bony elements.    Soft tissues: Normal.    Additional findings: None seen.    IMPRESSION:    1.  Osteoarthritis.            right foot  flattening of the normal arch of the foot. Early osteoarthritic  changes appear present in the hindfoot between the talus and navicular bone  predominantly.    Soft tissues: Normal.    Additional findings: None seen.    IMPRESSION:    1.  Flattening of the foot with osteoarthritic changes in the hindfoot.           left foot  Osteoarthritic changes are noted in the hindfoot involving the Chopart joints with loss of joint space and marginal osteophyte formation.    Soft tissues: Normal.    Additional findings: None seen.    IMPRESSION:    1.  Hindfoot osteoarthritis.                 Interim records from Dr. Washington some of most recent note 12/15/21 Televisit: whole body hurts; xeljanz and prednisone 5mg working well; A/P: Rheumatoid arthritis; hidradinitis suppurativa; was on humira for HS; lab showed AMBROCIO+ dsDNA+ after humira; past meds problem with Methotrexate injection; CHF avoid TNF; does not tolerate plaquenil GI upset; on xeljanz 11mg daily prior left arm fracture; will resume xeljanz 11mg as has been off because of hernia surgery; start prednisone 5mg daily; hidradenitis active discuss with Dermatology; biotene and  artificial tears for sicca; lab revisit 3 months        Interim records from Dermatology Dr. Samuel and she tells me she is working on getting back with Dermatology:         Gabapentin 300mg tid     xeljanz 11mg daily     ibuprofen 800mg prn     She has some interim pain and symptoms of inflammatory arthritis     No overt synovitis  Ankle deformities     Went over lab and xray reports     She tells me she is to see a new Dermatologist Dr. Iraida Perales as no longer seeing Dr. Samuel?     She had seen Podiatry in past per records but she does not know who or where noted     We can add sulfasalazine 500mg daily although I am not seeing overt active inflammatory arthritis     We will refer to Orthopedics for her DJD and the foreign body in right hand     We will also refer to Physical Medicine and Rehab MD for her chronic back pain DJD DDD     We will reseek records from Dr. Samuel     Revisit 3 months with lab 2 weeks prior     Contact us prn        Last visit:     Interim lab 10/31/22:       C3,4 normal     Creat 1.36 GFR 49; alb 4  Prior creat 0.89     WBC 7.7; Hgb 11.6 prior 12.3; MCV 97; plt 271     UA SG 1.02 moderate epi cells ketones     AMBROCIO neg      ESR 10; CRP 3.5        Interim records from Dr. Samuel:        Prev noted/prior plan:  (Gabapentin 300mg tid   xeljanz 11mg daily   ibuprofen 800mg prn   No overt synovitis  Ankle deformities   She tells me she is to see a new Dermatologist Dr. Iraida Perales as no longer seeing Dr. Samuel?   She had seen Podiatry in past per records but she does not know who or where noted   We can add sulfasalazine 500mg daily although I am not seeing overt active inflammatory arthritis   We will refer to Orthopedics for her DJD and the foreign body in right hand   We will also refer to Physical Medicine and Rehab MD for her chronic back pain DJD DDD   We will reseek records from Dr. Samuel)        She has interim symptoms of inflammatory arthritis but sounds like  mostly pain in back neck hips and knees     Did she see Orthopedics? Yes looks like  per interim phone note with PCP and was doing PT and was to have MRI     She tells me she has not had MRI yet with Physical Medicine and Rehab MD;   tells me she was sent to PT and found to have leg length asymmetry and she also had interim fall and she is working on contacting revising      She tells me she also saw Orthopedics for the foreign body and hand and carpal tunnel and he told her not worry about it as she has not been having any hand pain? noted     No overt synovitis she rather clarifies she has not had much joint swelling interim     Lower extremity edema and ankle deformities     Went over lab     Careful with NSAIDs prior ibuprofen given the renal function and consider Nephrology if persists; she tells me she has been using a lot of ibuprofen that she had left over from June since her interim fall and she will take care     She can use add tylenol +/- tramadol until she gets back with ; She does not have to wait for her visit; she can call them just as she can call us; we are here for her     Cont other:     Gabapentin 300mg tid   xeljanz 11mg daily   sulfasalazine 500mg daily      We will seek records from Orthopedics most recent note     We will seek records from Physical Medicine and Rehab MD most recent note     We will rereseek records from Dr. Samuel     Revisit 3 months with lab 2 weeks prior     Contact us prn     This visit:    Interim lab 1/30/23:          Creat 1.27 GFR 53 prior 49; alb 4      Ferritin 87      WBC 6 Hgb 11.8 prior 11.6 MCV 95; plt 220    ESR 13; CRP 3.6        Interim seek records from Orthopedics [facility] some of most recent note 8/11/22: superficial foreign body right hand; she is asymptomatic not causing her any problems; observe for now; call prn     Interim seek records from Dr. Carlton Physical Medicine and Rehab MD some of most recent note 8/17/22: Lumbar  DJD; Neuropathy; A/P: seek prior EMG; discussed treatment options include MRI and DAYANA versus conservative; She wants PT for now; revisit 6 weeks     Notes from PT/OT saying she no showed unable to get in touch including 8/26/22? But I understand she did eventually do PT per my prior note    Interim rereseek records from Dr. Samuel not sent but some from Dr. Iraida Perales some of most recent note 8/8/22: new patient for skin check; A/P: Hidradenitis suppurativa in periumbilical and left lateral abdomen; skin care; chlorhexidine, benzoyl peroxide; Tinea Corporis; topical antifungal; ketoconazole; diflucan oral; f/u 1 month      Prev noted/prior plan:  (Gabapentin 300mg tid   xeljanz 11mg daily   ibuprofen 800mg prn   No overt synovitis  Ankle deformities   She tells me she is to see a new Dermatologist Dr. Iraida Perales as no longer seeing Dr. Samuel?   She had seen Podiatry in past per records but she does not know who or where noted   We can add sulfasalazine 500mg daily although I am not seeing overt active inflammatory arthritis   We will refer to Orthopedics for her DJD and the foreign body in right hand   We will also refer to Physical Medicine and Rehab MD for her chronic back pain DJD DDD   We will reseek records from Dr. Samuel)     Prev noted/prior plan:   (She has interim symptoms of inflammatory arthritis but sounds like mostly pain in back neck hips and knees     Did she see Orthopedics? Yes looks like  per interim phone note with PCP and was doing PT and was to have MRI     She tells me she has not had MRI yet with Physical Medicine and Rehab MD;   tells me she was sent to PT and found to have leg length asymmetry and she also had interim fall and she is working on contacting revising      She tells me she also saw Orthopedics for the foreign body and hand and carpal tunnel and he told her not worry about it as she has not been having any hand pain? noted     No overt synovitis she rather  clarifies she has not had much joint swelling interim   Lower extremity edema and ankle deformities     Went over lab   Careful with NSAIDs prior ibuprofen given the renal function and consider Nephrology if persists; she tells me she has been using a lot of ibuprofen that she had left over from June since her interim fall and she will take care     She can use add tylenol +/- tramadol until she gets back with ; She does not have to wait for her visit; she can call them just as she can call us; we are here for her   Cont other:   Gabapentin 300mg tid   xeljanz 11mg daily   sulfasalazine 500mg daily      We will seek records from Orthopedics most recent note   We will seek records from Physical Medicine and Rehab MD most recent note   We will rereseek records from Dr. Samuel)       She has some interim symptoms of inflammatory arthritis that she can call in future if needed; also back and hip pain    She held off revisiting  as she wants to see different doctors because of disagreement (which she could stick with them until finding new doctor if possible?) and   saw PCP interim who is trying to get her to see a new Orthopedist    No overt synovitis  Ankle deformities edema    Verbal education     Went over lab and most of her records; see above; she wants to see different Orthopedics and Physical Medicine and Rehab MD working with PCP noted      See prior      I am going to refer to Nephrology and she should avoid NSAIDs        Revisit lab 2 weeks prior    Contact us prn     AMBROCIO neg repeat AMBROCIO neg RF neg CCP neg HepBsAgAbcoreAbneg HCV neg uric acid 5.6  B12>2000  Rheumatoid arthritis per limited records     2022 TB negative     Previously working with Dr. Washington     records from Dr. Washington some of most recent note 12/15/21 Televisit: whole body hurts; xeljanz and prednisone 5mg working well; A/P: Rheumatoid arthritis; hidradinitis suppurativa; was on humira for HS; lab showed AMBROCIO+ dsDNA+ after humira; past meds  problem with Methotrexate injection; CHF avoid TNF; does not tolerate plaquenil GI upset; on xeljanz 11mg daily prior left arm fracture; will resume xeljanz 11mg as has been off because of hernia surgery; start prednisone 5mg daily; hidradenitis active discuss with Dermatology; biotene and artificial tears for sicca; lab revisit 3 months      There is also some mechanical and neurogenic pain.     7/29/22 xray reports:  CXR COMPARISON:  April 6, 2021    FINDINGS:  Cardiovascular: Normal.    Lungs and pleura: Normal.    Mediastinal and hilar structures: Normal.    Osseous structures: Normal.         lumbar spine  Anterior osteophytic spicules seen off the vertebrae from L3 to S1.    Disc spaces: Normal.    Soft tissues: Normal.    Additional findings: None seen.    IMPRESSION:    1.  Mild degenerative changes.           cervical spine COMPARISON:  June 11, 2013    Anterior osteophytic spicules seen off the vertebrae from C5 to C7.   C1 and C2: Normal.    Soft tissues: Normal.    Additional findings: None seen.    IMPRESSION:    1.  Mild degenerative changes.           right hand  small radiopaque foreign body in the distal aspect of the second digit. This has the appearance of a broken off sewing needle.    Additional findings: None seen.    Small radiopaque foreign body in the second digit.           Hips/pelvis normal     right knee  loss of joint space in all 3 compartments with irregularity the  articular surface and marginal osteophyte formation off of all 3 bony elements.    Soft tissues: Normal.    Additional findings: None seen.    IMPRESSION:    1.  Osteoarthritis.           left knee  loss of joint space in all 3 compartments most notably in the lateral compartment. There is irregularity the articular surface and subchondral sclerosis. Marginal osteophyte formation is seen off of all 3 bony elements.    Soft tissues: Normal.    Additional findings: None seen.    IMPRESSION:    1.  Osteoarthritis.             right foot  flattening of the normal arch of the foot. Early osteoarthritic  changes appear present in the hindfoot between the talus and navicular bone  predominantly.    Soft tissues: Normal.    Additional findings: None seen.    IMPRESSION:    1.  Flattening of the foot with osteoarthritic changes in the hindfoot.           left foot  Osteoarthritic changes are noted in the hindfoot involving the Chopart joints with loss of joint space and marginal osteophyte formation.    Soft tissues: Normal.    Additional findings: None seen.    IMPRESSION:    1.  Hindfoot osteoarthritis.           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 Samuel for Hidradenitis then had skin reaction hives thought from humira noted     Prior problem with Methotrexate nausea     Prior Prednisone 5mg     ibuprofen 800mg prn     Neurontin 300mg bid     CaD     She tells me last week she could not walk and often needs a walker noted prev noted     She does not recall ever getting xrays for her joints     She has not seen Dr. Washington in years and PCP has been giving xeljanz and intermittent steroids prednisone prev noted     Prev noted/prior plan:   (No overt synovitis  Deformities including ankles feet  DJD  Edema in feet lower legs  Hyperpigmentation skin changes noted  Verbal education  Blood work and xrays to further evaluate  We will seek records from Dr. Washington  We will seek records from Dermatology Dr. Samuel and she tells me she is working on getting back with Dermatology   She will consider also working with Orthopedics and Physical Medicine and Rehab MD if needed  Cont xeljanz 11mg daily  ibuprofen 800mg prn  Revisit weeks   Contact  us prn)        Has been working with:     DM insulin     CHF followed by Cardiology     Hidradenitis per records      Also with history of rashes and hives     We will seek records from Dermatology and she tells me she is working on getting back with Dermatology prev noted     Smoking cessation     Records PCP Dr. Puckett some of recent note 2/11/22:     (( Establish Care (Pt had 2 falls that broke her arm), Referral (Pt need referral for a dietician and also a referral to a mental health facility. ), and Dizziness (Pt stated the dizziness until pt had covid)  HPI   Was seeing Dr Washington and states she has had a bunch of lab work but no appointment. She has been on methotrexate in the past but it made her sick     Patient states she had a pneumonia and influenza vaccine in 2018 and she had a severe reaction. She states she was waiting on Dr Washington to let her know if she is safe to get the covid vaccine. She states she has had it twice now     Her podatrist is Dr Cryer. 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 A brace has been ordered. She also has polyneuropathy which was discovered by a neurologist       Dr Bae is her Cardiologist     Neurologist is Dr Bai             Past Medical History:   Diagnosis Date    Anemia      Anxiety      Arthritis      CHF (congestive heart failure)      Depression      Hidradenitis suppurativa      Hypertension      Polyneuropathy                  Past Surgical History:   Procedure Laterality Date    club foot correction        gall blader removed        GASTRIC BYPASS        gastric sleeve        HERNIA REPAIR        HYSTERECTOMY         partial    meniscus repair surgery        repaired ac1        under arm graphs          Assessment:          ICD-10-CM ICD-9-CM   1. Polyarthritis  M13.0 716.50   2. Obesity, morbid  E66.01 278.01   3. Encounter for screening for malignant neoplasm of breast, unspecified screening modality  Z12.39 V76.10   4. Encounter for  screening mammogram for malignant neoplasm of breast   Z12.31 V76.12   5. Hypertension, unspecified type  I10 401.9   6. H/O bariatric surgery  Z98.84 V45.86   7. Low vitamin B12 level  E53.8 266.2       Plan:      Polyarthritis  -     Ambulatory referral/consult to Rheumatology; Future; Expected date: 02/18/2022  -     CBC Auto Differential; Future; Expected date: 02/11/2022  -     Comprehensive Metabolic Panel; Future; Expected date: 02/11/2022  -     TSH; Future; Expected date: 02/11/2022  -     Lipid Panel; Future; Expected date: 02/11/2022  -     Vitamin B12; Future; Expected date: 02/11/2022     Obesity, morbid     Encounter for screening for malignant neoplasm of breast, unspecified screening modality  -     Mammo Digital Screening Bilat; Future; Expected date: 02/11/2022     Encounter for screening mammogram for malignant neoplasm of breast   -     Mammo Digital Screening Bilat; Future; Expected date: 02/11/2022     Hypertension, unspecified type  -     CBC Auto Differential; Future; Expected date: 02/11/2022  -     Comprehensive Metabolic Panel; Future; Expected date: 02/11/2022  -     TSH; Future; Expected date: 02/11/2022  -     Lipid Panel; Future; Expected date: 02/11/2022  -     Vitamin B12; Future; Expected date: 02/11/2022     H/O bariatric surgery  -     CBC Auto Differential; Future; Expected date: 02/11/2022  -     Comprehensive Metabolic Panel; Future; Expected date: 02/11/2022  -     TSH; Future; Expected date: 02/11/2022  -     Lipid Panel; Future; Expected date: 02/11/2022  -     Vitamin B12; Future; Expected date: 02/11/2022     Low vitamin B12 level  -     Vitamin B12; Future; Expected date: 02/11/2022))     Review of medical records as stated above.  Lab +/- imaging and other ordered diagnostic studies to further evaluate.  See the orders associated with this note visit.  Medications as prescribed as tolerated.    Education including verbal and those noted in the patient  instructions.  Revisit as scheduled and call prn.    The following diagnoses influence medical decision making and/or need further workup including the orders listed below:    Rheumatoid arthritis of multiple sites with negative rheumatoid factor  -     CBC Auto Differential; Future; Expected date: 04/19/2023  -     CK; Future; Expected date: 04/19/2023  -     Comprehensive Metabolic Panel; Future; Expected date: 04/19/2023  -     C-Reactive Protein; Future; Expected date: 04/19/2023  -     Sedimentation rate; Future; Expected date: 04/19/2023  -     Urinalysis; Future; Expected date: 04/19/2023  -     tofacitinib (XELJANZ XR) 11 mg Tb24; Take 1 tablet by mouth once daily.  Dispense: 30 tablet; Refill: 4  -     sulfaSALAzine (AZULFIDINE) 500 MG EC tablet; Take 1 tablet (500 mg total) by mouth once daily.  Dispense: 30 tablet; Refill: 4    Other osteoarthritis involving multiple joints  -     CBC Auto Differential; Future; Expected date: 04/19/2023  -     CK; Future; Expected date: 04/19/2023  -     Comprehensive Metabolic Panel; Future; Expected date: 04/19/2023  -     C-Reactive Protein; Future; Expected date: 04/19/2023  -     Sedimentation rate; Future; Expected date: 04/19/2023  -     Urinalysis; Future; Expected date: 04/19/2023  -     traMADoL (ULTRAM) 50 mg tablet; Take 1 tablet (50 mg total) by mouth 3 (three) times daily as needed (moderate to severe pain).  Dispense: 90 tablet; Refill: 0    Cervical spondylosis  -     CBC Auto Differential; Future; Expected date: 04/19/2023  -     CK; Future; Expected date: 04/19/2023  -     Comprehensive Metabolic Panel; Future; Expected date: 04/19/2023  -     C-Reactive Protein; Future; Expected date: 04/19/2023  -     Sedimentation rate; Future; Expected date: 04/19/2023  -     Urinalysis; Future; Expected date: 04/19/2023  -     traMADoL (ULTRAM) 50 mg tablet; Take 1 tablet (50 mg total) by mouth 3 (three) times daily as needed (moderate to severe pain).  Dispense: 90  tablet; Refill: 0    Lumbar degenerative disc disease  -     CBC Auto Differential; Future; Expected date: 04/19/2023  -     CK; Future; Expected date: 04/19/2023  -     Comprehensive Metabolic Panel; Future; Expected date: 04/19/2023  -     C-Reactive Protein; Future; Expected date: 04/19/2023  -     Sedimentation rate; Future; Expected date: 04/19/2023  -     Urinalysis; Future; Expected date: 04/19/2023  -     traMADoL (ULTRAM) 50 mg tablet; Take 1 tablet (50 mg total) by mouth 3 (three) times daily as needed (moderate to severe pain).  Dispense: 90 tablet; Refill: 0    Neuropathy  -     CBC Auto Differential; Future; Expected date: 04/19/2023  -     CK; Future; Expected date: 04/19/2023  -     Comprehensive Metabolic Panel; Future; Expected date: 04/19/2023  -     C-Reactive Protein; Future; Expected date: 04/19/2023  -     Sedimentation rate; Future; Expected date: 04/19/2023  -     Urinalysis; Future; Expected date: 04/19/2023  -     gabapentin (NEURONTIN) 300 MG capsule; Take 1 capsule (300 mg total) by mouth 3 (three) times daily.  Dispense: 90 capsule; Refill: 3  -     traMADoL (ULTRAM) 50 mg tablet; Take 1 tablet (50 mg total) by mouth 3 (three) times daily as needed (moderate to severe pain).  Dispense: 90 tablet; Refill: 0    Myalgia  -     CBC Auto Differential; Future; Expected date: 04/19/2023  -     CK; Future; Expected date: 04/19/2023  -     Comprehensive Metabolic Panel; Future; Expected date: 04/19/2023  -     C-Reactive Protein; Future; Expected date: 04/19/2023  -     Sedimentation rate; Future; Expected date: 04/19/2023  -     Urinalysis; Future; Expected date: 04/19/2023    Superficial foreign body of right hand, sequela  -     CBC Auto Differential; Future; Expected date: 04/19/2023  -     CK; Future; Expected date: 04/19/2023  -     Comprehensive Metabolic Panel; Future; Expected date: 04/19/2023  -     C-Reactive Protein; Future; Expected date: 04/19/2023  -     Sedimentation rate; Future;  Expected date: 04/19/2023  -     Urinalysis; Future; Expected date: 04/19/2023    History of non anemic vitamin B12 deficiency  -     CBC Auto Differential; Future; Expected date: 04/19/2023  -     CK; Future; Expected date: 04/19/2023  -     Comprehensive Metabolic Panel; Future; Expected date: 04/19/2023  -     C-Reactive Protein; Future; Expected date: 04/19/2023  -     Sedimentation rate; Future; Expected date: 04/19/2023  -     Urinalysis; Future; Expected date: 04/19/2023    Stage 3a chronic kidney disease  -     Ambulatory referral/consult to Nephrology; Future; Expected date: 02/10/2023      Follow up in about 3 months (around 5/3/2023).     Fabrice Asencio MD

## 2023-02-03 ENCOUNTER — OFFICE VISIT (OUTPATIENT)
Dept: RHEUMATOLOGY | Facility: CLINIC | Age: 48
End: 2023-02-03
Payer: MEDICARE

## 2023-02-03 ENCOUNTER — PATIENT MESSAGE (OUTPATIENT)
Dept: RHEUMATOLOGY | Facility: CLINIC | Age: 48
End: 2023-02-03

## 2023-02-03 VITALS
RESPIRATION RATE: 18 BRPM | WEIGHT: 293 LBS | DIASTOLIC BLOOD PRESSURE: 102 MMHG | HEIGHT: 67 IN | BODY MASS INDEX: 45.99 KG/M2 | OXYGEN SATURATION: 99 % | SYSTOLIC BLOOD PRESSURE: 158 MMHG | HEART RATE: 64 BPM

## 2023-02-03 DIAGNOSIS — G62.9 NEUROPATHY: ICD-10-CM

## 2023-02-03 DIAGNOSIS — M06.09 RHEUMATOID ARTHRITIS OF MULTIPLE SITES WITH NEGATIVE RHEUMATOID FACTOR: Primary | ICD-10-CM

## 2023-02-03 DIAGNOSIS — M47.812 CERVICAL SPONDYLOSIS: ICD-10-CM

## 2023-02-03 DIAGNOSIS — Z86.39 HISTORY OF NON ANEMIC VITAMIN B12 DEFICIENCY: ICD-10-CM

## 2023-02-03 DIAGNOSIS — M79.10 MYALGIA: ICD-10-CM

## 2023-02-03 DIAGNOSIS — S60.551S: ICD-10-CM

## 2023-02-03 DIAGNOSIS — M51.36 LUMBAR DEGENERATIVE DISC DISEASE: ICD-10-CM

## 2023-02-03 DIAGNOSIS — M15.8 OTHER OSTEOARTHRITIS INVOLVING MULTIPLE JOINTS: ICD-10-CM

## 2023-02-03 DIAGNOSIS — N18.31 STAGE 3A CHRONIC KIDNEY DISEASE: ICD-10-CM

## 2023-02-03 PROCEDURE — 1160F PR REVIEW ALL MEDS BY PRESCRIBER/CLIN PHARMACIST DOCUMENTED: ICD-10-PCS | Mod: CPTII,S$GLB,, | Performed by: INTERNAL MEDICINE

## 2023-02-03 PROCEDURE — 4010F PR ACE/ARB THEARPY RXD/TAKEN: ICD-10-PCS | Mod: CPTII,S$GLB,, | Performed by: INTERNAL MEDICINE

## 2023-02-03 PROCEDURE — 3080F PR MOST RECENT DIASTOLIC BLOOD PRESSURE >= 90 MM HG: ICD-10-PCS | Mod: CPTII,S$GLB,, | Performed by: INTERNAL MEDICINE

## 2023-02-03 PROCEDURE — 3077F SYST BP >= 140 MM HG: CPT | Mod: CPTII,S$GLB,, | Performed by: INTERNAL MEDICINE

## 2023-02-03 PROCEDURE — 3080F DIAST BP >= 90 MM HG: CPT | Mod: CPTII,S$GLB,, | Performed by: INTERNAL MEDICINE

## 2023-02-03 PROCEDURE — 3008F BODY MASS INDEX DOCD: CPT | Mod: CPTII,S$GLB,, | Performed by: INTERNAL MEDICINE

## 2023-02-03 PROCEDURE — 4010F ACE/ARB THERAPY RXD/TAKEN: CPT | Mod: CPTII,S$GLB,, | Performed by: INTERNAL MEDICINE

## 2023-02-03 PROCEDURE — 3008F PR BODY MASS INDEX (BMI) DOCUMENTED: ICD-10-PCS | Mod: CPTII,S$GLB,, | Performed by: INTERNAL MEDICINE

## 2023-02-03 PROCEDURE — 1159F PR MEDICATION LIST DOCUMENTED IN MEDICAL RECORD: ICD-10-PCS | Mod: CPTII,S$GLB,, | Performed by: INTERNAL MEDICINE

## 2023-02-03 PROCEDURE — 3077F PR MOST RECENT SYSTOLIC BLOOD PRESSURE >= 140 MM HG: ICD-10-PCS | Mod: CPTII,S$GLB,, | Performed by: INTERNAL MEDICINE

## 2023-02-03 PROCEDURE — 99214 PR OFFICE/OUTPT VISIT, EST, LEVL IV, 30-39 MIN: ICD-10-PCS | Mod: S$GLB,,, | Performed by: INTERNAL MEDICINE

## 2023-02-03 PROCEDURE — 1160F RVW MEDS BY RX/DR IN RCRD: CPT | Mod: CPTII,S$GLB,, | Performed by: INTERNAL MEDICINE

## 2023-02-03 PROCEDURE — 1159F MED LIST DOCD IN RCRD: CPT | Mod: CPTII,S$GLB,, | Performed by: INTERNAL MEDICINE

## 2023-02-03 PROCEDURE — 99214 OFFICE O/P EST MOD 30 MIN: CPT | Mod: S$GLB,,, | Performed by: INTERNAL MEDICINE

## 2023-02-03 RX ORDER — TOFACITINIB 11 MG/1
1 TABLET, FILM COATED, EXTENDED RELEASE ORAL DAILY
Qty: 30 TABLET | Refills: 4 | Status: SHIPPED | OUTPATIENT
Start: 2023-02-03 | End: 2023-05-03

## 2023-02-03 RX ORDER — SULFASALAZINE 500 MG/1
500 TABLET, DELAYED RELEASE ORAL DAILY
Qty: 30 TABLET | Refills: 4 | Status: SHIPPED | OUTPATIENT
Start: 2023-02-03 | End: 2023-05-03 | Stop reason: SDUPTHER

## 2023-02-03 RX ORDER — GABAPENTIN 300 MG/1
300 CAPSULE ORAL 3 TIMES DAILY
Qty: 90 CAPSULE | Refills: 3 | Status: SHIPPED | OUTPATIENT
Start: 2023-02-03 | End: 2024-01-02 | Stop reason: SDUPTHER

## 2023-02-03 RX ORDER — TRAMADOL HYDROCHLORIDE 50 MG/1
50 TABLET ORAL 3 TIMES DAILY PRN
Qty: 90 TABLET | Refills: 0 | Status: SHIPPED | OUTPATIENT
Start: 2023-02-03 | End: 2023-05-03 | Stop reason: SDUPTHER

## 2023-02-09 ENCOUNTER — PATIENT MESSAGE (OUTPATIENT)
Dept: ADMINISTRATIVE | Facility: OTHER | Age: 48
End: 2023-02-09
Payer: MEDICARE

## 2023-02-10 ENCOUNTER — PATIENT OUTREACH (OUTPATIENT)
Dept: ADMINISTRATIVE | Facility: HOSPITAL | Age: 48
End: 2023-02-10
Payer: MEDICARE

## 2023-02-24 ENCOUNTER — TELEPHONE (OUTPATIENT)
Dept: FAMILY MEDICINE | Facility: CLINIC | Age: 48
End: 2023-02-24
Payer: MEDICARE

## 2023-02-24 ENCOUNTER — TELEPHONE (OUTPATIENT)
Dept: PULMONOLOGY | Facility: CLINIC | Age: 48
End: 2023-02-24
Payer: MEDICARE

## 2023-02-24 NOTE — TELEPHONE ENCOUNTER
----- Message from Balbina Gaffney sent at 2/24/2023 10:53 AM CST -----  Regarding: NP info  Contact: patient  PT is wanting to become a new pt appointment for COPD she has please return call [phone]

## 2023-02-24 NOTE — TELEPHONE ENCOUNTER
Informed patient that Barb is no longer at [facility] and I can schedule her with Dr. Jones for a hospital F/U pneumonia. I got her scheduled on March 8th at 2:30. Patient verbalized understanding

## 2023-02-24 NOTE — TELEPHONE ENCOUNTER
----- Message from Balbina Gaffney sent at 2/24/2023 10:53 AM CST -----  Regarding: NP info  Contact: patient  PT is wanting to become a new pt appointment for COPD she has please return call [phone]

## 2023-02-27 ENCOUNTER — TELEPHONE (OUTPATIENT)
Dept: PULMONOLOGY | Facility: CLINIC | Age: 48
End: 2023-02-27
Payer: MEDICARE

## 2023-02-27 DIAGNOSIS — J44.1 COPD EXACERBATION: Primary | ICD-10-CM

## 2023-02-27 NOTE — TELEPHONE ENCOUNTER
----- Message from Balbina Gaffney sent at 2/24/2023 10:53 AM CST -----  Regarding: NP info  Contact: patient  PT is wanting to become a new pt appointment for COPD she has please return call [phone]

## 2023-04-24 NOTE — PROGRESS NOTES
Subjective:      Patient ID: Lilly Loera is a 47 y.o. female.    Chief Complaint: Disease Management    HPI:   Includes joint pain with subjective swelling.   Antecedent event includes: None;  Pain location includes: hip, knees, ankles, and back;  Gradual onset; beginning >years ago;  Constant ache, Moderate in severity,   Lasting >minutes, Worse during the daytime;   Improved with rest, medication, change in position, and none;   Worsened with activity, overuse, stress, and rest;         Review of Systems   Constitutional:  Positive for fatigue. Negative for fever and unexpected weight change.   HENT:  Negative for mouth dryness, mouth sores and trouble swallowing.    Eyes:  Positive for redness. Negative for visual disturbance and eye dryness.   Respiratory:  Negative for cough and shortness of breath.    Cardiovascular:  Positive for leg swelling. Negative for chest pain.   Gastrointestinal:  Negative for constipation and diarrhea.   Genitourinary:  Negative for dysuria, genital sores and nocturia.   Musculoskeletal:  Positive for arthralgias, joint swelling and myalgias.   Integumentary:  Negative for rash.   Neurological:  Negative for headaches.   Hematological:  Does not bruise/bleed easily.   Psychiatric/Behavioral:  Positive for sleep disturbance. The patient is nervous/anxious.     Past Medical History:   Diagnosis Date    Anemia     Anxiety     Arthritis     CHF (congestive heart failure)     Depression     Hidradenitis suppurativa     Hypertension     Polyneuropathy      Past Surgical History:   Procedure Laterality Date    club foot correction      gall blader removed      GASTRIC BYPASS      gastric sleeve      HERNIA REPAIR      HYSTERECTOMY      partial    meniscus repair surgery      repaired ac1      under arm graphs        See the Assessment/Plan for further characterization of the HPI, ROS, Medical, Surgical, Family, and Social Histories including Tobacco use, Meds; all of which has  "been reviewed in Epic.    Medication List with Changes/Refills   Current Medications    BUDESONIDE-FORMOTEROL 80-4.5 MCG (SYMBICORT) 80-4.5 MCG/ACTUATION HFAA    Inhale 2 puffs into the lungs. Controller    BUPROPION HCL, SMOKING DETER, (ZYBAN) 150 MG TBSR 12 HR TABLET    Take 1 tablet (150 mg total) by mouth every 12 (twelve) hours.    BUSPIRONE (BUSPAR) 15 MG TABLET    Pt is to take 1/3 (5 mg) or 1/2 (7.5mgs) or one po HS PRN    CALCIUM CARBONATE (OS-MICHELLE) 500 MG CALCIUM (1,250 MG) CHEWABLE TABLET    Take 1 tablet by mouth once daily.    CHOLECALCIFEROL, VITAMIN D3, 125 MCG (5,000 UNIT) TAB        CYANOCOBALAMIN 1,000 MCG/ML INJECTION    Inject 1,000 mcg as directed.    GABAPENTIN (NEURONTIN) 300 MG CAPSULE    Take 1 capsule (300 mg total) by mouth 3 (three) times daily.    METOPROLOL SUCCINATE (TOPROL-XL) 50 MG 24 HR TABLET    Take 50 mg by mouth.    MULTIVITAMIN CAPSULE    Take 1 capsule by mouth once daily.    MUPIROCIN (BACTROBAN) 2 % OINTMENT    3 (three) times daily. Apply to affected area    PANTOPRAZOLE (PROTONIX) 40 MG TABLET    Take 1 tablet (40 mg total) by mouth once daily.    SACUBITRIL-VALSARTAN (ENTRESTO)  MG PER TABLET    Take 1 tablet by mouth 2 (two) times daily.    SURE COMFORT INSULIN SYRINGE 1 ML 29 GAUGE X 1/2" SYRG       Changed and/or Refilled Medications    Modified Medication Previous Medication    SULFASALAZINE (AZULFIDINE) 500 MG EC TABLET sulfaSALAzine (AZULFIDINE) 500 MG EC tablet       Take 1 tablet (500 mg total) by mouth 2 (two) times daily.    Take 1 tablet (500 mg total) by mouth once daily.    TRAMADOL (ULTRAM) 50 MG TABLET traMADoL (ULTRAM) 50 mg tablet       Take 1 tablet (50 mg total) by mouth 3 (three) times daily as needed (moderate to severe pain).    Take 1 tablet (50 mg total) by mouth 3 (three) times daily as needed (moderate to severe pain).   Discontinued Medications    TOFACITINIB (XELJANZ XR) 11 MG TB24    Take 1 tablet by mouth once daily.         Objective: " "  /86   Pulse 74   Resp 18   Ht 5' 7" (1.702 m)   Wt (!) 143.9 kg (317 lb 3.2 oz)   SpO2 97%   BMI 49.68 kg/m²   Physical Exam  Non-toxic appearance. No distress.   Normocephalic and atraumatic.   External ears normal.    Conjunctivae and EOM are normal. No scleral icterus.   RRR, No friction rub; palpable distal pulses.    No tachypnea or signs of respiratory distress.   Abdominal: No guarding or rebound tenderness.   Neurological: Oriented. Normal thought content.   Skin is warm. No pallor.   Musculoskeletal: see below for further input.     [facility] UNKNOWN RAD EAP                                RADIOLOGY REPORT        PT NAME: CAMI SANDERS      [facility]     : 1975 F 47             [address]    ACCT: [id number]                                              [facility] Rec #: [id number]                                        [id number]    Patient Location: [place].RAD[facility]/             Procedure: GLENIS SCREEN INDIRA W CAD RHINA    REQUISITION #: [id number]      REPORT #: [id number]           DATE OF EXAM: 22    TIME OF EXAM: 09       CMS MANDATED QUALITY DATA - MAMMOGRAPHY - 225        [facility] utilizes a reminder system to ensure that all   patients receive reminder letters. This includes reminders for routine   screening mammograms, diagnostic mammograms or other breast imaging studies    or interventions where appropriate. This patient's information will be   entered into the appropriate reminder system.                BILATERAL DIGITAL SCREENING MAMMOGRAPHY            HISTORY: Screening, Z 12.31        TECHNIQUE: Bilateral digital CC and MLO views obtained. 3-D tomosynthesis   imaging. CAD utilized during film review.        COMPARISON: 2021    Breast density: Scattered fibroglandular densities.            FINDINGS:  There is no dominant mass. There are no suspicious calcifications   and there are no areas of architectural distortion. Overall " no change from   prior studies.        Assessment: BI-RADS Category 1, negative.        Recommendation: Routine mammography in one year.        P1 17.44%        Based on ACR recommendations, the patient's lifetime risk for developing   breast cancer has been assessed today. There are number of models available,   but at [facility] we employ the [place] model. [place] is a   comprehensive model that takes a number of factors into consideration to   help determine risk including: parity, height, weight, hormone replacement   therapy, age of menopause, age of childbirth, breast biopsy, and family   history. All of these factors are weighted into a calculator to produce this   value. Those patients whose lifetime risk is 20% or greater should consider    adding a breast MRI to annual mammogram screening.        DICTATING PHYSICIAN:  GARRETT LOUIS MD                   Date Dictated: 09/27/22 1535        Signed By:  GARRETT LOUIS MD <Electronically signed by GARRETT LOUIS MD in OV>    Date Signed:  09/27/22 1536     CC: CHANTEL POST MD ; CHANTEL POST MD      ADMITTING PHYSICIAN:                                                                                                    ORDERING PHY: CHANTEL POST MD                                                                                                                                                      ATTENDING PHY: CHANTEL POST MD    Patient Status:  REG CLI    Admit Service Date: 09/27/22         Orders Only on 10/31/2022   Component Date Value Ref Range Status    CPK 10/31/2022 113  26 - 192 U/L Final    Comment: NOTE  Testing performed at:  [facility], [address] [place] [address]  [address] CLIA #:[id number]      C3 Complement 10/31/2022 100  90 - 180 mg/dL Final    Comment: NOTE  Testing performed at:  [facility], [address] [place] [address]  [address] CLIA #:[id number]      C4 Complement 10/31/2022 16  10 - 40 mg/dL  Final    Comment: NOTE  Testing performed at:  [facility], [address] [place] [address]  [address] CLIA #:[id number]      Glucose 10/31/2022 82  74 - 106 mg/dL Final    BUN 10/31/2022 22.4 (H)  6 - 20 mg/dL Final    Creatinine 10/31/2022 1.36 (H)  0.50 - 0.90 mg/dL Final    Recommend repeat creatinine within 90 days.    AST 10/31/2022 24  0 - 32 U/L Final    ALT (SGPT) 10/31/2022 12  0 - 33 U/L Final    Alkaline Phosphatase 10/31/2022 75  35 - 105 U/L Final    Calcium 10/31/2022 9.1  8.6 - 10.2 mg/dL Final    Protein, Total 10/31/2022 7.0  6.4 - 8.3 g/dL Final    Albumin 10/31/2022 4.0  3.5 - 5.2 g/dL Final    BILIRUBIN, TOTAL 10/31/2022 0.49  0.00 - 1.20 mg/dL Final    Sodium 10/31/2022 139  136 - 145 mmol/L Final    Potassium 10/31/2022 4.3  3.5 - 5.1 mmol/L Final    Chloride 10/31/2022 103  98 - 107 mmol/L Final    CO2 10/31/2022 29  22 - 29 mmol/L Final    Globulin 10/31/2022 3.0  1.5 - 4.5 g/dL Final    Albumin/Globulin Ratio 10/31/2022 1.3  1.0 - 2.7 Final    BUN/Creatinine Ratio 10/31/2022 16.5  6 - 20 Final    GFR ESTIMATION 10/31/2022 48.95 (L)  >60.00 Final    Anion Gap 10/31/2022 7.0 (L)  8.0 - 17.0 mmol/L Final    Comment: NOTE  Testing performed at:  [facility], [address] [place] [address]  [address] CLIA #:[id number]      CRP QUANTITATIVE 10/31/2022 3.5  <5.0 mg/L Final    Comment: Significantly decreased CRP values may be obtained from samples taken  from patients who have been treated with carboxypenicillins.      CRP QUALITATIVE 10/31/2022 NEGATIVE  NEGATIVE mg/L Final    Comment: NOTE  Testing performed at:  [facility], [address] [place] [address]  [address] CLIA #:[id number]      SED RATE (WESTERGREN) 10/31/2022 10  0 - 20 mm/hr Final    Comment: NOTE  Testing performed at:  [facility], [address] [place] [address]  [address] CLIA #:[id number]      WBC 10/31/2022 7.70  4.3 - 10.8 X 10 3/ul Final    RBC 10/31/2022 3.71 (L)  4.2 - 5.4 X 10 6/ul  Final    RDW-SD 10/31/2022 48.6  37 - 54 fl Final    Hemoglobin 10/31/2022 11.6 (L)  12 - 16 g/dL Final    Hematocrit 10/31/2022 36.0 (L)  37 - 47 % Final    MCV 10/31/2022 97.0  82 - 100 fl Final    MCH 10/31/2022 31.3  27 - 32 pg Final    MCHC 10/31/2022 32.2  32 - 36 g/dL Final    Platelets 10/31/2022 271  135 - 400 X 10 3/ul Final    Neutrophils 10/31/2022 55.1  34 - 71.1 % Final    Lymphocytes 10/31/2022 36.5  19.3 - 53.1 % Final    Monocytes 10/31/2022 5.8  4.7 - 12.5 % Final    Eosinophils 10/31/2022 1.9  0.7 - 7.0 % Final    Basophils 10/31/2022 0.3  0.2 - 1.2 % Final    Neutrophils Absolute 10/31/2022 4.24  2.15 - 7.56 X 10 3/ul Final    Lymphocytes Absolute 10/31/2022 2.81  0.86 - 4.75 X 10 3/ul Final    Monocytes Absolute 10/31/2022 0.45  0.22 - 1.08 X 10 3/ul Final    Eosinophils Absolute 10/31/2022 0.15  0.04 - 0.54 X 10 3/ul Final    Basophils Absolute 10/31/2022 0.02  0.00 - 0.22 X 10 3/ul Final    Immature Granulocytes Absolute 10/31/2022 0.03  0 - 0.04 X 10 3/ul Final    Immature Granulocytes 10/31/2022 0.4  0 - 0.5 % Final    IG includes metamyelocytes, myelocytes, and promyelocytes    nRBC# 10/31/2022 0.0  0 - 0.2 /100 WBC Final    nRBC Count Absolute 10/31/2022 0.000  0 - 0.012 x 10 3/ul Final    Comment: NOTE  Testing performed at:  [facility], [address] [place] [address]  [address] CLIA #:[id number]      WBC/HPF 10/31/2022 0-5  <5 Final    RBC/HPF 10/31/2022 0-5  <5 Final    Amorphous, UA 10/31/2022 NEGATIVE   Final    Bacteria, UA 10/31/2022 2+ (A)  NEG-TRACE Final    Epithelial Cells 10/31/2022 MODERATE (A)  NEGATIVE-FEW Final    Mucus, UA 10/31/2022 2+ (A)  NEGATIVE Final    Hyaline Casts, UA 10/31/2022 0-2 PER LPF   Final    Comment: NOTE  Testing performed at:  [facility], [address] [place] [address] CLIA #:[id number]      Color, UA 10/31/2022 YELLOW   Final    Clarity, UA 10/31/2022 HAZY   Final    Specific Gravity,UA 10/31/2022 1.020  1.005 - 1.030  Final    pH, Urine 10/31/2022 5  5 - 7.5 Final    Leukocytes, UA 10/31/2022 SMALL (A)  NEGATIVE Final    Nitrite, Urine 10/31/2022 NEGATIVE  NEGATIVE Final    Protein, UA 10/31/2022 25 (H)  NEGATIVE mg/dL Final    Glucose, UA 10/31/2022 NEGATIVE  NEGATIVE mg/dL Final    Ketones, UA 10/31/2022 TRACE (A)  NEGATIVE mg/dL Final    Urobilinogen, urine 10/31/2022 4.0 (H)  0 - 1.0 E.U./dL Final    Bilirubin (UA) 10/31/2022 MODERATE (A)  NEGATIVE Final    Occult Blood 10/31/2022 NEGATIVE  NEGATIVE Final    Comment: NOTE  Testing performed at:  [facility], [address] [place] [address]  [address] CLIA #:[id number]      AMBROCIO 10/31/2022 NEGATIVE  NEGATIVE Final    Comment: AMBROCIO testing performed using IFA (indirect fluorescent antibody).  NOTE  Testing performed at:  [facility], [address] [place] [address]  [address] CLIA #:[id number]     Orders Only on 07/29/2022   Component Date Value Ref Range Status    QuantiFERON-TB Gold Plus 07/29/2022 NEGATIVE  Negative Final    Comment: Interpretive Data: Quantiferon TB Gold PlusInterferon gamma release is measured for specimens from each ofthe four collection tubes. A qualitative result (Negative,Positive, or Indeterminate) is based on interpretation of thefour values, NIL, MITOGEN   minus NIL (MITOGEN-NIL), TB1 minus NIL(TB1-NIL), and TB2 minus NIL (TB2-NIL). The NIL value representsnonspecific reactivity produced by the patient specimen. TheMITOGEN-NIL value serves as the positive control for the patientspecimen, demonstrating   successful lymphocyte activity. TheTB1-NIL tube specifically detects CD4+ lymphocyte reactivity,specifically stimulated by the TB1 antigens. The TB2-NIL tubedetects both CD4+ and CD8+ lymphocyte reactivity, stimulated byTB2 antigens. An overall Negative   result does not completelyrule out TB infection.A false-positive result in the absence of other clinicalevidence of TB infection is not uncommon. Refer to: UpdatedGuidelines for  Using Interferon Gamma Release Assays to DetectMycobacterium                            tuberculosis   Infection --- [place], 2010(http://www.cdc.gov/mmwr/preview/mmwrhtml/af3839e2.htm), formore information concerning test performance in low-prevalencepopulations and use in occupational screening.      QuantiFERON TB1 NIL 07/29/2022 0.00  0.00 - 0.34 IU/mL Final    QuantiFERON TB2 NIL 07/29/2022 0.00  0.00 - 0.34 IU/mL Final    QuantiFERON Mitogen Minus NIL 07/29/2022 >10.00  IU/mL Final    QuantiFERON TB NIL 07/29/2022 0.02  IU/mL Final    Performed By: [address] [id number] Director: Mario Saab MD, PhD   Orders Only on 07/29/2022   Component Date Value Ref Range Status    Hepatitis B Core Ab Total 07/29/2022 NEGATIVE  Negative Final    Comment: INTERPRETIVE INFORMATION: Hepatitis B Core Ab (Total)This assay should not be used for blood donor screening,associated re-entry protocols, or for screening Human Cells,Tissues and Cellular and Tissue-Based Products (HCT/P).Performed By: [address] [id number] Director: Mario Saab MD, PHD      C3 Complement 07/29/2022 113  90 - 180 mg/dL Final    Comment: REFERENCE INTERVAL: Complement Component 3Access complete set of age- and/or gender-specific referenceintervals for this test in the [facility] Test Directory([email or web address]).Performed By: [facility] [address] [place] [address]   [id number] Director: Mario Saab MD, PhD      C4 Complement 07/29/2022 21  10 - 40 mg/dL Final    Comment: REFERENCE INTERVAL: Complement Component 4Access complete set of age- and/or gender-specific referenceintervals for this test in the [facility] Test Directory([email or web address]).Performed By: [address]   [id number] Director: Mario Saab MD, PhD      AMBROCIO 07/29/2022 NONE DETECTED  None Detected Final     Comment: If suspicion of connective tissue disease is strong and AMBROCIO EIAis negative, consider testing for AMBROCIO by IFA ([id number]).No antibodies to Anti-Nuclear Antibodies (AMBROCIO) detected.  TheExtractable Nuclear Antigen Antibodies (RNP, Perales, SSA 52, SSA60,   Scleroderma, Sharona-1 and SSB) and Double Stranded DNA (dsDNA)Antibody, IgG will not be performed.INTERPRETIVE INFORMATION: Anti-Nuclear Antibodies (AMBROCIO), IgG byELISAAntinuclear Antibodies (AMBROCIO), IgG by ROEL: AMBROCIO specimens arescreened using enzyme-linked   immunosorbent assay (ROEL)methodology. All ROEL results reported as Detected are furthertested by indirect fluorescent assay (IFA) using HEp-2 substratewith an IgG-specific conjugate. The AMBROCIO ROEL screen is designedto detect antibodies against dsDNA,   histones, SS-A (Ro), SS-B(La), Perales, Smith/RNP, Scl-70, Sharona-1, centromeric proteins,other antigens extracted from the HEp-2 cell nucleus. AMBROCIO ELISAassays have been reported to have lower sensitivities than ANAIFA for systemic autoimmune rheum                           atic   diseases (SARD).Negative results do not necessarily rule out SARD.Performed By: [email or web address] [place] [id number] Director: Mario Saab MD, PhD      Rheumatoid Factor 07/29/2022 <10  0 - 14 IU/mL Final    Performed By: [email or web address] [place], [place] [id number] Director: Mario Saab MD, PhD    Cyclic Citrullinated Peptide (CCP)* 07/29/2022 2  0 - 19 Units Final    Comment: INTERPRETIVE INFORMATION: Cyclic Citrullinated Peptide Antibody,IgG  19 Units or less ................... Negative  20-39 Units ........................ Weak Positive  40-59 Units ........................ Moderate Positive  60 Units or greater   ................ Strong PositiveAnti-cyclic citrullinated peptide (anti-CCP), IgG antibodies arepresent in about 69-83 percent of patients with rheumatoidarthritis (RA) and have specificities of 93-95 percent.  Theseautoantibodies may be present in the   preclinical phase ofdisease, are associated with future RA development, and maypredict radiographic joint destruction. Patients with weakpositive results should be monitored and testing repeated.Performed By: [facility] [place] [id number] Director: Mario Saab MD, PhD      Total Protein, Electrophoresis 07/29/2022 6.8  6.3 - 8.2 g/dL Final    Albumin, Electrophoresis 07/29/2022 3.61 (A)  3.75 - 5.01 g/dL Final    Alpha-1-Globulin 07/29/2022 0.29  0.19 - 0.46 g/dL Final    Alpha-2-Globulin 07/29/2022 0.65  0.48 - 1.05 g/dL Final    Beta Globulin 07/29/2022 0.83  0.48 - 1.10 g/dL Final    Gamma Globulin 07/29/2022 1.42  0.62 - 1.51 g/dL Final    Immunofix Interp. 07/29/2022 SEE NOTE   Final    Normal SPEP pattern.  Immunofixation electrophoresis (LUCHO)is a more sensitive technique for the identification ofsmall M-proteins.    EER Protein Electrophoresis 07/29/2022 SEE NOTE   Final    Authorized individuals can access the [facility] Report using the following link:[email or web address] By: [facility] [place] [id number] Director: Mario Saab MD, PhD   Orders Only on 07/29/2022   Component Date Value Ref Range Status    Hep B S Ab 07/29/2022 Nonreactive  Nonreactive Final    Hepatitis B Surface Ag 07/29/2022 Nonreactive  Nonreactive Final    Hepatitis C Ab 07/29/2022 Nonreactive  Nonreactive Final   Orders Only on 07/29/2022   Component Date Value Ref Range Status    WBC 07/29/2022 6.2  4.6 - 10.2 10*3/uL Final    RBC 07/29/2022 3.96 (L)  4.2 - 5.4 10*6/uL Final    Hemoglobin 07/29/2022 12.3  12.0 - 16.0 g/dL Final    Hematocrit 07/29/2022 37.9  37.0 - 47.0 % Final    MCV 07/29/2022 95.7  80 - 97 fL Final    MCH 07/29/2022 31.1  27.0 - 31.2 pg Final    MCHC 07/29/2022 32.5  31.8 - 35.4 g/dL Final    RDW RBC Auto-Rto 07/29/2022 13.6  12.5 - 18.0 % Final     Platelets 07/29/2022 274  142 - 424 10*3/uL Final    Neutrophils 07/29/2022 54.5  37 - 80 % Final    Lymphocytes 07/29/2022 36.4  25 - 40 % Final    Monocytes 07/29/2022 6.2  1 - 15 % Final    Eosinophils 07/29/2022 2.3  1 - 3 % Final    Basophils 07/29/2022 0.3  0 - 3 % Final    nRBC# 07/29/2022 0.0  % Final    Neutrophils Absolute 07/29/2022 3.35  1.8 - 7.7 10*3/uL Final    Sed Rate 07/29/2022 21 (H)  0 - 20 mm/hr Final   Orders Only on 07/29/2022   Component Date Value Ref Range Status    Sodium 07/29/2022 140  135 - 145 mmol/L Final    Potassium 07/29/2022 4.2  3.6 - 5.2 mmol/L Final    Chloride 07/29/2022 107  100 - 108 mmol/L Final    CO2 07/29/2022 29  21 - 32 mmol/L Final    Anion Gap 07/29/2022 4.0  3.0 - 11.0 mmol/L Final    BUN 07/29/2022 19 (H)  7 - 18 mg/dL Final    Creatinine 07/29/2022 0.89  0.55 - 1.02 mg/dL Final    GFR ESTIMATION 07/29/2022 > 60  >60 Final               DESCRIPTION       GLOMERULAR FILTRATION RATE             NORMAL                     >60             KIDNEY  DISEASE           15-60             KIDNEY FAILURE             <15    BUN/Creatinine Ratio 07/29/2022 21.34 (H)  7 - 18 Ratio Final    Glucose 07/29/2022 93  70 - 110 mg/dL Final    Calcium 07/29/2022 8.9  8.8 - 10.5 mg/dL Final    Total Bilirubin 07/29/2022 0.6  0.0 - 1.0 mg/dL Final    AST 07/29/2022 20  15 - 37 U/L Final    ALT 07/29/2022 16  12 - 78 U/L Final    Total Protein 07/29/2022 7.3  6.4 - 8.2 g/dL Final    Albumin 07/29/2022 3.5  3.4 - 5.0 g/dL Final    Globulin 07/29/2022 3.8 (H)  2.3 - 3.5 g/dL Final    Albumin/Globulin Ratio 07/29/2022 0.921 (L)  1.1 - 1.8 Ratio Final    Alkaline Phosphatase 07/29/2022 71  46 - 116 U/L Final    Uric Acid 07/29/2022 5.6  2.6 - 6.0 mg/dL Final    CK, Serum 07/29/2022 64  26 - 308 U/L Final    CRP QUANTITATIVE 07/29/2022 0.3  0.0 - 0.9 mg/dL Final   Orders Only on 07/29/2022   Component Date Value Ref Range Status    Specimen Collection Method 07/29/2022 Void   Final    Color,  UA 07/29/2022 Yellow  Yellow Final    Appearance, UA 07/29/2022 Very Cloudy (A)  Clear Final    pH, UA 07/29/2022 6.0  5.0 - 9.0 pH Final    Specific Gravity, UA 07/29/2022 1.020  1.000 - 1.030 Final    Protein, UA 07/29/2022 Trace (A)  Negative mg/dL Final    Glucose, UA 07/29/2022 Normal  Normal mg/dL Final    Ketones, UA 07/29/2022 Negative  Negative mg/dL Final    Occult Blood UA 07/29/2022 25 (A)  Negative /uL Final    Nitrite, UA 07/29/2022 Negative  Negative Final    Bilirubin (UA) 07/29/2022 Negative  Negative mg/dL Final    Urobilinogen, UA 07/29/2022 Normal  Normal mg/dL Final    Leukocytes, UA 07/29/2022 Negative  Negative /uL Final    RBC, UA 07/29/2022 0-2  0 - 2 /HPF Final    WBC, UA 07/29/2022 None  0 - 5 /HPF Final    Squam Epithel, UA 07/29/2022 2+ Moderate  /LPF Final    Bacteria 07/29/2022 1+ Few  Few/HPF /HPF Final    Service Comment 03 07/29/2022 No, Criteria Not Met   Final        All of the data above and below has been reviewed by myself and any further interpretations will be reflected in the assessment and plan.   The data includes review of external notes, and independent interpretation of lab results, x-rays, and imaging reports.  Active inflammatory arthritis is an illness that poses a threat to bodily function and even a threat to life in some cases.  Drug therapy to treat inflammatory arthritis usually requires intensive monitoring for toxicity.    Review of patient's allergies indicates:   Allergen Reactions    Adhesive Blisters, Hives, Itching and Swelling    Adhesive tape-silicones      whelp and redness     Assessment/Plan:          Prev noted/prior plan:   (No overt synovitis  Deformities including ankles feet  DJD  Edema in feet lower legs  Hyperpigmentation skin changes noted        Verbal education        Blood work and xrays to further evaluate     We will seek records from Dr. Washington     We will seek records from Dermatology Dr. Samuel and she tells me she is working on  getting back with Dermatology      She will consider also working with Orthopedics and Physical Medicine and Rehab MD if needed     Cont xeljanz 11mg daily     ibuprofen 800mg prn     Revisit weeks      Contact us prn)     Visit prior to Visit prior to Last visit:     Interim lab:            Lab Results   Component Value Date      07/29/2022     K 4.2 07/29/2022      07/29/2022     CO2 29 07/29/2022     ANIONGAP 4.0 07/29/2022     GLU 93 07/29/2022     CALCIUM 8.9 07/29/2022     BUN 19 (H) 07/29/2022     CREATININE 0.89 07/29/2022     TSH 1.27 02/11/2022     PROT 7.3 07/29/2022     AST 20 07/29/2022     ALT 16 07/29/2022     BILITOT 0.6 07/29/2022     ALKPHOS 71 07/29/2022     WBC 6.2 07/29/2022     NEUTROPHILS 54.5 07/29/2022     HGB 12.3 07/29/2022     MCV 95.7 07/29/2022      02/11/2022     LABPLAT 274 07/29/2022     SEDRATE 21 (H) 07/29/2022     WBCUA None 07/29/2022     RBCUA 0-2 07/29/2022     BACTERIA 1+ Few 07/29/2022     PROTEINUA Trace (A) 07/29/2022         CPK 64     SPEP normal        AMBROCIO neg RF neg CCP neg HepBsAgAbcoreAbneg HCV neg uric acid 5.6        ESR 21 CRP 0.3        Interim 7/29/22 xray reports:  CXR COMPARISON:  April 6, 2021    FINDINGS:  Cardiovascular: Normal.    Lungs and pleura: Normal.    Mediastinal and hilar structures: Normal.    Osseous structures: Normal.         lumbar spine  Anterior osteophytic spicules seen off the vertebrae from L3 to S1.    Disc spaces: Normal.    Soft tissues: Normal.    Additional findings: None seen.    IMPRESSION:    1.  Mild degenerative changes.           cervical spine COMPARISON:  June 11, 2013    Anterior osteophytic spicules seen off the vertebrae from C5 to C7.   C1 and C2: Normal.    Soft tissues: Normal.    Additional findings: None seen.    IMPRESSION:    1.  Mild degenerative changes.           right hand  small radiopaque foreign body in the distal aspect of the second digit. This has the appearance of a broken off sewing  needle.    Additional findings: None seen.    Small radiopaque foreign body in the second digit.           Hips/pelvis normal     right knee  loss of joint space in all 3 compartments with irregularity the  articular surface and marginal osteophyte formation off of all 3 bony elements.    Soft tissues: Normal.    Additional findings: None seen.    IMPRESSION:    1.  Osteoarthritis.           left knee  loss of joint space in all 3 compartments most notably in the lateral compartment. There is irregularity the articular surface and subchondral sclerosis. Marginal osteophyte formation is seen off of all 3 bony elements.    Soft tissues: Normal.    Additional findings: None seen.    IMPRESSION:    1.  Osteoarthritis.            right foot  flattening of the normal arch of the foot. Early osteoarthritic  changes appear present in the hindfoot between the talus and navicular bone  predominantly.    Soft tissues: Normal.    Additional findings: None seen.    IMPRESSION:    1.  Flattening of the foot with osteoarthritic changes in the hindfoot.           left foot  Osteoarthritic changes are noted in the hindfoot involving the Chopart joints with loss of joint space and marginal osteophyte formation.    Soft tissues: Normal.    Additional findings: None seen.    IMPRESSION:    1.  Hindfoot osteoarthritis.                 Interim records from Dr. Washington some of most recent note 12/15/21 Televisit: whole body hurts; xeljanz and prednisone 5mg working well; A/P: Rheumatoid arthritis; hidradinitis suppurativa; was on humira for HS; lab showed AMBROCIO+ dsDNA+ after humira; past meds problem with Methotrexate injection; CHF avoid TNF; does not tolerate plaquenil GI upset; on xeljanz 11mg daily prior left arm fracture; will resume xeljanz 11mg as has been off because of hernia surgery; start prednisone 5mg daily; hidradenitis active discuss with Dermatology; biotene and artificial tears for sicca; lab revisit 3 months        Interim  records from Dermatology Dr. Samuel and she tells me she is working on getting back with Dermatology:         Gabapentin 300mg tid     xeljanz 11mg daily     ibuprofen 800mg prn     She has some interim pain and symptoms of inflammatory arthritis     No overt synovitis  Ankle deformities     Went over lab and xray reports     She tells me she is to see a new Dermatologist Dr. Iraida Perales as no longer seeing Dr. Samuel?     She had seen Podiatry in past per records but she does not know who or where noted     We can add sulfasalazine 500mg daily although I am not seeing overt active inflammatory arthritis     We will refer to Orthopedics for her DJD and the foreign body in right hand     We will also refer to Physical Medicine and Rehab MD for her chronic back pain DJD DDD     We will reseek records from Dr. Samuel     Revisit 3 months with lab 2 weeks prior     Contact us prn        Visit prior to Last visit:     Interim lab 10/31/22:       C3,4 normal     Creat 1.36 GFR 49; alb 4  Prior creat 0.89     WBC 7.7; Hgb 11.6 prior 12.3; MCV 97; plt 271     UA SG 1.02 moderate epi cells ketones     AMBROCIO neg      ESR 10; CRP 3.5        Interim records from Dr. Samuel:        Prev noted/prior plan:  (Gabapentin 300mg tid   xeljanz 11mg daily   ibuprofen 800mg prn   No overt synovitis  Ankle deformities   She tells me she is to see a new Dermatologist Dr. Iraida Perales as no longer seeing Dr. Samuel?   She had seen Podiatry in past per records but she does not know who or where noted   We can add sulfasalazine 500mg daily although I am not seeing overt active inflammatory arthritis   We will refer to Orthopedics for her DJD and the foreign body in right hand   We will also refer to Physical Medicine and Rehab MD for her chronic back pain DJD DDD   We will reseek records from Dr. Samuel)        She has interim symptoms of inflammatory arthritis but sounds like mostly pain in back neck hips and knees     Did she  see Orthopedics? Yes looks like  per interim phone note with PCP and was doing PT and was to have MRI     She tells me she has not had MRI yet with Physical Medicine and Rehab MD;   tells me she was sent to PT and found to have leg length asymmetry and she also had interim fall and she is working on contacting revising      She tells me she also saw Orthopedics for the foreign body and hand and carpal tunnel and he told her not worry about it as she has not been having any hand pain? noted     No overt synovitis she rather clarifies she has not had much joint swelling interim     Lower extremity edema and ankle deformities     Went over lab     Careful with NSAIDs prior ibuprofen given the renal function and consider Nephrology if persists; she tells me she has been using a lot of ibuprofen that she had left over from June since her interim fall and she will take care     She can use add tylenol +/- tramadol until she gets back with ; She does not have to wait for her visit; she can call them just as she can call us; we are here for her     Cont other:     Gabapentin 300mg tid   xeljanz 11mg daily   sulfasalazine 500mg daily      We will seek records from Orthopedics most recent note     We will seek records from Physical Medicine and Rehab MD most recent note     We will rereseek records from Dr. Samuel     Revisit 3 months with lab 2 weeks prior     Contact us prn      Last visit:     Interim lab 1/30/23:             Creat 1.27 GFR 53 prior 49; alb 4        Ferritin 87        WBC 6 Hgb 11.8 prior 11.6 MCV 95; plt 220     ESR 13; CRP 3.6           Interim seek records from Orthopedics [facility] some of most recent note 8/11/22: superficial foreign body right hand; she is asymptomatic not causing her any problems; observe for now; call prn     Interim seek records from Dr. Carlton [facility] some of most recent note 8/17/22: Lumbar DJD; Neuropathy; A/P: seek prior EMG;  discussed treatment options include MRI and DAYANA versus conservative; She wants PT for now; revisit 6 weeks     Notes from PT/OT saying she no showed unable to get in touch including 8/26/22? But I understand she did eventually do PT per my prior note     Interim rereseek records from Dr. Samuel not sent but some from Dr. Iraida Perales some of most recent note 8/8/22: new patient for skin check; A/P: Hidradenitis suppurativa in periumbilical and left lateral abdomen; skin care; chlorhexidine, benzoyl peroxide; Tinea Corporis; topical antifungal; ketoconazole; diflucan oral; f/u 1 month        Prev noted/prior plan:  (Gabapentin 300mg tid   xeljanz 11mg daily   ibuprofen 800mg prn   No overt synovitis  Ankle deformities   She tells me she is to see a new Dermatologist Dr. Iraida Perales as no longer seeing Dr. Samuel?   She had seen Podiatry in past per records but she does not know who or where noted   We can add sulfasalazine 500mg daily although I am not seeing overt active inflammatory arthritis   We will refer to Orthopedics for her DJD and the foreign body in right hand   We will also refer to Physical Medicine and Rehab MD for her chronic back pain DJD DDD   We will reseek records from Dr. Samuel)     Prev noted/prior plan:   (She has interim symptoms of inflammatory arthritis but sounds like mostly pain in back neck hips and knees     Did she see Orthopedics? Yes looks like  per interim phone note with PCP and was doing PT and was to have MRI     She tells me she has not had MRI yet with Physical Medicine and Rehab MD;   tells me she was sent to PT and found to have leg length asymmetry and she also had interim fall and she is working on contacting revising      She tells me she also saw Orthopedics for the foreign body and hand and carpal tunnel and he told her not worry about it as she has not been having any hand pain? noted     No overt synovitis she rather clarifies she has not had much joint  swelling interim   Lower extremity edema and ankle deformities      Went over lab   Careful with NSAIDs prior ibuprofen given the renal function and consider Nephrology if persists; she tells me she has been using a lot of ibuprofen that she had left over from June since her interim fall and she will take care     She can use add tylenol +/- tramadol until she gets back with ; She does not have to wait for her visit; she can call them just as she can call us; we are here for her   Cont other:   Gabapentin 300mg tid   xeljanz 11mg daily   sulfasalazine 500mg daily      We will seek records from Orthopedics most recent note   We will seek records from Physical Medicine and Rehab MD most recent note   We will rereseek records from Dr. Samuel)        She has some interim symptoms of inflammatory arthritis that she can call in future if needed; also back and hip pain     She held off revisiting  as she wants to see different doctors because of disagreement (which she could stick with them until finding new doctor if possible?) and   saw PCP interim who is trying to get her to see a new Orthopedist     No overt synovitis  Ankle deformities edema     Verbal education      Went over lab and most of her records; see above; she wants to see different Orthopedics and Physical Medicine and Rehab MD working with PCP noted        See prior        I am going to refer to Nephrology and she should avoid NSAIDs       This visit:    Interim lab 4/26/23:    CPK 87    Creat 0.95 GFR 75 prior 53; alb 4.2    Na 147    UA rare cell          ESR 12; CRP 4.7      Prev noted/prior plan:  (Gabapentin 300mg tid   xeljanz 11mg daily   ibuprofen 800mg prn   No overt synovitis  Ankle deformities   She tells me she is to see a new Dermatologist Dr. Iraida Perales as no longer seeing Dr. Samuel?   She had seen Podiatry in past per records but she does not know who or where noted   We can add sulfasalazine 500mg daily although I am not seeing  overt active inflammatory arthritis   We will refer to Orthopedics for her DJD and the foreign body in right hand   We will also refer to Physical Medicine and Rehab MD for her chronic back pain DJD DDD   We will reseek records from Dr. Samuel)     Prev noted/prior plan:   (She has interim symptoms of inflammatory arthritis but sounds like mostly pain in back neck hips and knees     Did she see Orthopedics? Yes looks like  per interim phone note with PCP and was doing PT and was to have MRI     She tells me she has not had MRI yet with Physical Medicine and Rehab MD;   tells me she was sent to PT and found to have leg length asymmetry and she also had interim fall and she is working on contacting revising      She tells me she also saw Orthopedics for the foreign body and hand and carpal tunnel and he told her not worry about it as she has not been having any hand pain? noted     No overt synovitis she rather clarifies she has not had much joint swelling interim   Lower extremity edema and ankle deformities      Went over lab   Careful with NSAIDs prior ibuprofen given the renal function and consider Nephrology if persists; she tells me she has been using a lot of ibuprofen that she had left over from June since her interim fall and she will take care     She can use add tylenol +/- tramadol until she gets back with ; She does not have to wait for her visit; she can call them just as she can call us; we are here for her   Cont other:   Gabapentin 300mg tid   xeljanz 11mg daily   sulfasalazine 500mg daily      We will seek records from Orthopedics most recent note   We will seek records from Physical Medicine and Rehab MD most recent note   We will rereseek records from Dr. Samuel)     Prev noted/prior plan:   (She has some interim symptoms of inflammatory arthritis that she can call in future if needed; also back and hip pain     She held off revisiting  as she wants to see different doctors because of  disagreement (which she could stick with them until finding new doctor if possible?) and   saw PCP interim who is trying to get her to see a new Orthopedist     No overt synovitis  Ankle deformities edema     Verbal education      Went over lab and most of her records; see above; she wants to see different Orthopedics and Physical Medicine and Rehab MD working with PCP noted      See prior     I am going to refer to Nephrology and she should avoid NSAIDs)    She has some interim symptoms of inflammatory arthritis; pain in ankles knees hip and back    She rather clarifies doing fairly well      She rather clarifies she has not been using xeljanz for some time as she did not feel it was helping and concerned about possible side effects    Tells me she was hospitalized for pneumonia and CHF interim and is back to baseline    Did she ever see Orthopedics? No but she is looking into and tells me also to see a new PCP      No overt synovitis but some difficult given body habitus  DJD  Ankle deformities edema    Verbal education    Went over lab that is back; GFR is better    Belinda tells me CBC was missed to get done noted    We will seek records from her Hospitalization; discharge summary and recent note from Cardiology     tylenol +/- tramadol until she gets back with ; She does not have to wait for her visit; she can call them just as she can call us; we are here for her   Gabapentin 300mg tid she tells me she gets from other MD and has plenty; but it looks like I have been refilling in ?    Prior xeljanz 11mg daily she stopped    Increase sulfasalazine 500mg bid for now    See prior        Revisit lab 2 weeks prior     Contact us prn     AMBROCIO neg repeat AMBROCIO neg RF neg CCP neg HepBsAgAbcoreAbneg HCV neg uric acid 5.6  B12>2000  Rheumatoid arthritis per limited records     2022 TB negative     Previously working with Dr. Washington     records from Dr. Washington some of most recent note 12/15/21 Televisit: whole body hurts;  xeljanz and prednisone 5mg working well; A/P: Rheumatoid arthritis; hidradinitis suppurativa; was on humira for HS; lab showed AMBROCIO+ dsDNA+ after humira; past meds problem with Methotrexate injection; CHF avoid TNF; does not tolerate plaquenil GI upset; on xeljanz 11mg daily prior left arm fracture; will resume xeljanz 11mg as has been off because of hernia surgery; start prednisone 5mg daily; hidradenitis active discuss with Dermatology; biotene and artificial tears for sicca; lab revisit 3 months      records from Orthopedics [facility] some of most recent note 8/11/22: superficial foreign body right hand; she is asymptomatic not causing her any problems; observe for now; call prn     records from Dr. Carlton [facility] some of most recent note 8/17/22: Lumbar DJD; Neuropathy; A/P: seek prior EMG; discussed treatment options include MRI and DAYANA versus conservative; She wants PT for now; revisit 6 weeks     Notes from PT/OT saying she no showed unable to get in touch including 8/26/22? But I understand she did eventually do PT per my prior note     records from Dr. Samuel not sent but some from Dr. Iraida Perales some of most recent note 8/8/22: new patient for skin check; A/P: Hidradenitis suppurativa in periumbilical and left lateral abdomen; skin care; chlorhexidine, benzoyl peroxide; Tinea Corporis; topical antifungal; ketoconazole; diflucan oral; f/u 1 month     There is also some mechanical and neurogenic pain.     7/29/22 xray reports:  CXR COMPARISON:  April 6, 2021    FINDINGS:  Cardiovascular: Normal.    Lungs and pleura: Normal.    Mediastinal and hilar structures: Normal.    Osseous structures: Normal.         lumbar spine  Anterior osteophytic spicules seen off the vertebrae from L3 to S1.    Disc spaces: Normal.    Soft tissues: Normal.    Additional findings: None seen.    IMPRESSION:    1.  Mild degenerative changes.           cervical spine COMPARISON:  June 11, 2013     Anterior osteophytic spicules seen off the vertebrae from C5 to C7.   C1 and C2: Normal.    Soft tissues: Normal.    Additional findings: None seen.    IMPRESSION:    1.  Mild degenerative changes.           right hand  small radiopaque foreign body in the distal aspect of the second digit. This has the appearance of a broken off sewing needle.    Additional findings: None seen.    Small radiopaque foreign body in the second digit.           Hips/pelvis normal     right knee  loss of joint space in all 3 compartments with irregularity the  articular surface and marginal osteophyte formation off of all 3 bony elements.    Soft tissues: Normal.    Additional findings: None seen.    IMPRESSION:    1.  Osteoarthritis.           left knee  loss of joint space in all 3 compartments most notably in the lateral compartment. There is irregularity the articular surface and subchondral sclerosis. Marginal osteophyte formation is seen off of all 3 bony elements.    Soft tissues: Normal.    Additional findings: None seen.    IMPRESSION:    1.  Osteoarthritis.            right foot  flattening of the normal arch of the foot. Early osteoarthritic  changes appear present in the hindfoot between the talus and navicular bone  predominantly.    Soft tissues: Normal.    Additional findings: None seen.    IMPRESSION:    1.  Flattening of the foot with osteoarthritic changes in the hindfoot.           left foot  Osteoarthritic changes are noted in the hindfoot involving the Chopart joints with loss of joint space and marginal osteophyte formation.    Soft tissues: Normal.    Additional findings: None seen.    IMPRESSION:    1.  Hindfoot osteoarthritis.           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 Samuel for Hidradenitis then had skin reaction hives thought from humira noted     Prior problem with Methotrexate nausea     Prior Prednisone 5mg     ibuprofen 800mg prn     Neurontin 300mg bid     CaD     She tells me last week she could not walk and often needs a walker noted prev noted     She does not recall ever getting xrays for her joints     She has not seen Dr. Washington in years and PCP has been giving xeljanz and intermittent steroids prednisone prev noted     Prev noted/prior plan:   (No overt synovitis  Deformities including ankles feet  DJD  Edema in feet lower legs  Hyperpigmentation skin changes noted  Verbal education  Blood work and xrays to further evaluate  We will seek records from Dr. Washington  We will seek records from Dermatology Dr. Samuel and she tells me she is working on getting back with Dermatology   She will consider also working with Orthopedics and Physical Medicine and Rehab MD if needed  Cont xeljanz 11mg daily  ibuprofen 800mg prn  Revisit weeks   Contact us prn)        Has been working with:     DM insulin     CHF followed by Cardiology     Hidradenitis per records      Also with history of rashes and hives     We will seek records from Dermatology and she tells me she is working on getting back with Dermatology prev noted     Smoking cessation     Records PCP Dr. Puckett some of recent note 2/11/22:     (( Establish Care (Pt had 2 falls that broke her arm), Referral (Pt need referral for a dietician and also a referral to a mental health facility. ), and Dizziness (Pt stated the dizziness until pt had covid)  HPI   Was seeing Dr Washington and states she has had a bunch of lab work but no appointment. She has been on methotrexate in the past but it made her sick     Patient states she had a pneumonia and influenza vaccine in 2018 and she had a severe reaction. She states she was waiting on Dr Washington to let her know if  she is safe to get the covid vaccine. She states she has had it twice now     Her podatrist is Dr Cryer. 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 A brace has been ordered. She also has polyneuropathy which was discovered by a neurologist       Dr Bae is her Cardiologist     Neurologist is Dr Bai             Past Medical History:   Diagnosis Date    Anemia      Anxiety      Arthritis      CHF (congestive heart failure)      Depression      Hidradenitis suppurativa      Hypertension      Polyneuropathy                  Past Surgical History:   Procedure Laterality Date    club foot correction        gall blader removed        GASTRIC BYPASS        gastric sleeve        HERNIA REPAIR        HYSTERECTOMY         partial    meniscus repair surgery        repaired ac1        under arm graphs          Assessment:          ICD-10-CM ICD-9-CM   1. Polyarthritis  M13.0 716.50   2. Obesity, morbid  E66.01 278.01   3. Encounter for screening for malignant neoplasm of breast, unspecified screening modality  Z12.39 V76.10   4. Encounter for screening mammogram for malignant neoplasm of breast   Z12.31 V76.12   5. Hypertension, unspecified type  I10 401.9   6. H/O bariatric surgery  Z98.84 V45.86   7. Low vitamin B12 level  E53.8 266.2       Plan:      Polyarthritis  -     Ambulatory referral/consult to Rheumatology; Future; Expected date: 02/18/2022  -     CBC Auto Differential; Future; Expected date: 02/11/2022  -     Comprehensive Metabolic Panel; Future; Expected date: 02/11/2022  -     TSH; Future; Expected date: 02/11/2022  -     Lipid Panel; Future; Expected date: 02/11/2022  -     Vitamin B12; Future; Expected date: 02/11/2022     Obesity, morbid     Encounter for screening for malignant neoplasm of breast, unspecified screening modality  -     Mammo Digital Screening Bilat; Future; Expected date: 02/11/2022     Encounter for screening mammogram for malignant neoplasm of breast   -     Mammo Digital  Screening Bilat; Future; Expected date: 02/11/2022     Hypertension, unspecified type  -     CBC Auto Differential; Future; Expected date: 02/11/2022  -     Comprehensive Metabolic Panel; Future; Expected date: 02/11/2022  -     TSH; Future; Expected date: 02/11/2022  -     Lipid Panel; Future; Expected date: 02/11/2022  -     Vitamin B12; Future; Expected date: 02/11/2022     H/O bariatric surgery  -     CBC Auto Differential; Future; Expected date: 02/11/2022  -     Comprehensive Metabolic Panel; Future; Expected date: 02/11/2022  -     TSH; Future; Expected date: 02/11/2022  -     Lipid Panel; Future; Expected date: 02/11/2022  -     Vitamin B12; Future; Expected date: 02/11/2022     Low vitamin B12 level  -     Vitamin B12; Future; Expected date: 02/11/2022))     Review of medical records as stated above.  Lab +/- imaging and other ordered diagnostic studies to further evaluate.  See the orders associated with this note visit.  Medications as prescribed as tolerated.    Education including verbal and those noted in the patient instructions.  Revisit as scheduled and call prn.    The following diagnoses influence medical decision making and/or need further workup including the orders listed below:    Rheumatoid arthritis of multiple sites with negative rheumatoid factor  -     sulfaSALAzine (AZULFIDINE) 500 MG EC tablet; Take 1 tablet (500 mg total) by mouth 2 (two) times daily.  Dispense: 60 tablet; Refill: 4  -     CBC Auto Differential; Future; Expected date: 06/22/2023  -     CK; Future; Expected date: 06/22/2023  -     Comprehensive Metabolic Panel; Future; Expected date: 06/22/2023  -     C-Reactive Protein; Future; Expected date: 06/22/2023  -     Sedimentation rate; Future; Expected date: 06/22/2023  -     Urinalysis; Future; Expected date: 06/22/2023    Other osteoarthritis involving multiple joints  -     traMADoL (ULTRAM) 50 mg tablet; Take 1 tablet (50 mg total) by mouth 3 (three) times daily as  needed (moderate to severe pain).  Dispense: 90 tablet; Refill: 0  -     CBC Auto Differential; Future; Expected date: 06/22/2023  -     CK; Future; Expected date: 06/22/2023  -     Comprehensive Metabolic Panel; Future; Expected date: 06/22/2023  -     C-Reactive Protein; Future; Expected date: 06/22/2023  -     Sedimentation rate; Future; Expected date: 06/22/2023  -     Urinalysis; Future; Expected date: 06/22/2023    Cervical spondylosis  -     traMADoL (ULTRAM) 50 mg tablet; Take 1 tablet (50 mg total) by mouth 3 (three) times daily as needed (moderate to severe pain).  Dispense: 90 tablet; Refill: 0  -     CBC Auto Differential; Future; Expected date: 06/22/2023  -     CK; Future; Expected date: 06/22/2023  -     Comprehensive Metabolic Panel; Future; Expected date: 06/22/2023  -     C-Reactive Protein; Future; Expected date: 06/22/2023  -     Sedimentation rate; Future; Expected date: 06/22/2023  -     Urinalysis; Future; Expected date: 06/22/2023    Neuropathy  -     traMADoL (ULTRAM) 50 mg tablet; Take 1 tablet (50 mg total) by mouth 3 (three) times daily as needed (moderate to severe pain).  Dispense: 90 tablet; Refill: 0  -     CBC Auto Differential; Future; Expected date: 06/22/2023  -     CK; Future; Expected date: 06/22/2023  -     Comprehensive Metabolic Panel; Future; Expected date: 06/22/2023  -     C-Reactive Protein; Future; Expected date: 06/22/2023  -     Sedimentation rate; Future; Expected date: 06/22/2023  -     Urinalysis; Future; Expected date: 06/22/2023    Lumbar degenerative disc disease  -     traMADoL (ULTRAM) 50 mg tablet; Take 1 tablet (50 mg total) by mouth 3 (three) times daily as needed (moderate to severe pain).  Dispense: 90 tablet; Refill: 0  -     CBC Auto Differential; Future; Expected date: 06/22/2023  -     CK; Future; Expected date: 06/22/2023  -     Comprehensive Metabolic Panel; Future; Expected date: 06/22/2023  -     C-Reactive Protein; Future; Expected date:  06/22/2023  -     Sedimentation rate; Future; Expected date: 06/22/2023  -     Urinalysis; Future; Expected date: 06/22/2023    Myalgia  -     CBC Auto Differential; Future; Expected date: 06/22/2023  -     CK; Future; Expected date: 06/22/2023  -     Comprehensive Metabolic Panel; Future; Expected date: 06/22/2023  -     C-Reactive Protein; Future; Expected date: 06/22/2023  -     Sedimentation rate; Future; Expected date: 06/22/2023  -     Urinalysis; Future; Expected date: 06/22/2023    Stage 3a chronic kidney disease  -     CBC Auto Differential; Future; Expected date: 06/22/2023  -     CK; Future; Expected date: 06/22/2023  -     Comprehensive Metabolic Panel; Future; Expected date: 06/22/2023  -     C-Reactive Protein; Future; Expected date: 06/22/2023  -     Sedimentation rate; Future; Expected date: 06/22/2023  -     Urinalysis; Future; Expected date: 06/22/2023    History of non anemic vitamin B12 deficiency  -     CBC Auto Differential; Future; Expected date: 06/22/2023  -     CK; Future; Expected date: 06/22/2023  -     Comprehensive Metabolic Panel; Future; Expected date: 06/22/2023  -     C-Reactive Protein; Future; Expected date: 06/22/2023  -     Sedimentation rate; Future; Expected date: 06/22/2023  -     Urinalysis; Future; Expected date: 06/22/2023    Superficial foreign body of right hand, sequela  -     CBC Auto Differential; Future; Expected date: 06/22/2023  -     CK; Future; Expected date: 06/22/2023  -     Comprehensive Metabolic Panel; Future; Expected date: 06/22/2023  -     C-Reactive Protein; Future; Expected date: 06/22/2023  -     Sedimentation rate; Future; Expected date: 06/22/2023  -     Urinalysis; Future; Expected date: 06/22/2023      Follow up in about 2 months (around 7/3/2023).     Fabrice Asencio MD

## 2023-05-03 ENCOUNTER — OFFICE VISIT (OUTPATIENT)
Dept: RHEUMATOLOGY | Facility: CLINIC | Age: 48
End: 2023-05-03
Payer: MEDICARE

## 2023-05-03 VITALS
HEIGHT: 67 IN | DIASTOLIC BLOOD PRESSURE: 86 MMHG | WEIGHT: 293 LBS | RESPIRATION RATE: 18 BRPM | HEART RATE: 74 BPM | OXYGEN SATURATION: 97 % | BODY MASS INDEX: 45.99 KG/M2 | SYSTOLIC BLOOD PRESSURE: 132 MMHG

## 2023-05-03 DIAGNOSIS — M15.8 OTHER OSTEOARTHRITIS INVOLVING MULTIPLE JOINTS: ICD-10-CM

## 2023-05-03 DIAGNOSIS — M06.09 RHEUMATOID ARTHRITIS OF MULTIPLE SITES WITH NEGATIVE RHEUMATOID FACTOR: Primary | ICD-10-CM

## 2023-05-03 DIAGNOSIS — M47.812 CERVICAL SPONDYLOSIS: ICD-10-CM

## 2023-05-03 DIAGNOSIS — Z86.39 HISTORY OF NON ANEMIC VITAMIN B12 DEFICIENCY: ICD-10-CM

## 2023-05-03 DIAGNOSIS — M51.36 LUMBAR DEGENERATIVE DISC DISEASE: ICD-10-CM

## 2023-05-03 DIAGNOSIS — N18.31 STAGE 3A CHRONIC KIDNEY DISEASE: ICD-10-CM

## 2023-05-03 DIAGNOSIS — M79.10 MYALGIA: ICD-10-CM

## 2023-05-03 DIAGNOSIS — S60.551S: ICD-10-CM

## 2023-05-03 DIAGNOSIS — G62.9 NEUROPATHY: ICD-10-CM

## 2023-05-03 PROCEDURE — 3079F DIAST BP 80-89 MM HG: CPT | Mod: CPTII,S$GLB,, | Performed by: INTERNAL MEDICINE

## 2023-05-03 PROCEDURE — 1159F MED LIST DOCD IN RCRD: CPT | Mod: CPTII,S$GLB,, | Performed by: INTERNAL MEDICINE

## 2023-05-03 PROCEDURE — 3079F PR MOST RECENT DIASTOLIC BLOOD PRESSURE 80-89 MM HG: ICD-10-PCS | Mod: CPTII,S$GLB,, | Performed by: INTERNAL MEDICINE

## 2023-05-03 PROCEDURE — 1160F PR REVIEW ALL MEDS BY PRESCRIBER/CLIN PHARMACIST DOCUMENTED: ICD-10-PCS | Mod: CPTII,S$GLB,, | Performed by: INTERNAL MEDICINE

## 2023-05-03 PROCEDURE — 3075F PR MOST RECENT SYSTOLIC BLOOD PRESS GE 130-139MM HG: ICD-10-PCS | Mod: CPTII,S$GLB,, | Performed by: INTERNAL MEDICINE

## 2023-05-03 PROCEDURE — 1159F PR MEDICATION LIST DOCUMENTED IN MEDICAL RECORD: ICD-10-PCS | Mod: CPTII,S$GLB,, | Performed by: INTERNAL MEDICINE

## 2023-05-03 PROCEDURE — 4010F PR ACE/ARB THEARPY RXD/TAKEN: ICD-10-PCS | Mod: CPTII,S$GLB,, | Performed by: INTERNAL MEDICINE

## 2023-05-03 PROCEDURE — 3008F BODY MASS INDEX DOCD: CPT | Mod: CPTII,S$GLB,, | Performed by: INTERNAL MEDICINE

## 2023-05-03 PROCEDURE — 3008F PR BODY MASS INDEX (BMI) DOCUMENTED: ICD-10-PCS | Mod: CPTII,S$GLB,, | Performed by: INTERNAL MEDICINE

## 2023-05-03 PROCEDURE — 1160F RVW MEDS BY RX/DR IN RCRD: CPT | Mod: CPTII,S$GLB,, | Performed by: INTERNAL MEDICINE

## 2023-05-03 PROCEDURE — 99214 PR OFFICE/OUTPT VISIT, EST, LEVL IV, 30-39 MIN: ICD-10-PCS | Mod: S$GLB,,, | Performed by: INTERNAL MEDICINE

## 2023-05-03 PROCEDURE — 99214 OFFICE O/P EST MOD 30 MIN: CPT | Mod: S$GLB,,, | Performed by: INTERNAL MEDICINE

## 2023-05-03 PROCEDURE — 4010F ACE/ARB THERAPY RXD/TAKEN: CPT | Mod: CPTII,S$GLB,, | Performed by: INTERNAL MEDICINE

## 2023-05-03 PROCEDURE — 3075F SYST BP GE 130 - 139MM HG: CPT | Mod: CPTII,S$GLB,, | Performed by: INTERNAL MEDICINE

## 2023-05-03 RX ORDER — TRAMADOL HYDROCHLORIDE 50 MG/1
50 TABLET ORAL 3 TIMES DAILY PRN
Qty: 90 TABLET | Refills: 0 | Status: SHIPPED | OUTPATIENT
Start: 2023-05-03

## 2023-05-03 RX ORDER — SULFASALAZINE 500 MG/1
500 TABLET, DELAYED RELEASE ORAL 2 TIMES DAILY
Qty: 60 TABLET | Refills: 4 | Status: SHIPPED | OUTPATIENT
Start: 2023-05-03 | End: 2023-07-13 | Stop reason: SDUPTHER

## 2023-05-11 DIAGNOSIS — J44.1 COPD EXACERBATION: Primary | ICD-10-CM

## 2023-05-29 ENCOUNTER — PATIENT MESSAGE (OUTPATIENT)
Dept: ADMINISTRATIVE | Facility: HOSPITAL | Age: 48
End: 2023-05-29
Payer: MEDICARE

## 2023-07-13 ENCOUNTER — OFFICE VISIT (OUTPATIENT)
Dept: FAMILY MEDICINE | Facility: CLINIC | Age: 48
End: 2023-07-13
Payer: MEDICARE

## 2023-07-13 VITALS
TEMPERATURE: 97 F | WEIGHT: 293 LBS | HEART RATE: 85 BPM | HEIGHT: 67 IN | RESPIRATION RATE: 15 BRPM | SYSTOLIC BLOOD PRESSURE: 140 MMHG | DIASTOLIC BLOOD PRESSURE: 88 MMHG | BODY MASS INDEX: 45.99 KG/M2

## 2023-07-13 DIAGNOSIS — F33.0 MILD RECURRENT MAJOR DEPRESSION: ICD-10-CM

## 2023-07-13 DIAGNOSIS — I10 ESSENTIAL HYPERTENSION: ICD-10-CM

## 2023-07-13 DIAGNOSIS — D84.821 DRUG-INDUCED IMMUNODEFICIENCY: ICD-10-CM

## 2023-07-13 DIAGNOSIS — Q66.89 CLUB FOOT OF BOTH LOWER EXTREMITIES: ICD-10-CM

## 2023-07-13 DIAGNOSIS — Z76.89 ENCOUNTER TO ESTABLISH CARE: Primary | ICD-10-CM

## 2023-07-13 DIAGNOSIS — Z12.11 COLON CANCER SCREENING: ICD-10-CM

## 2023-07-13 DIAGNOSIS — E66.01 OBESITY, MORBID: ICD-10-CM

## 2023-07-13 DIAGNOSIS — Z79.899 DRUG-INDUCED IMMUNODEFICIENCY: ICD-10-CM

## 2023-07-13 DIAGNOSIS — Z79.899 OTHER LONG TERM (CURRENT) DRUG THERAPY: ICD-10-CM

## 2023-07-13 DIAGNOSIS — F17.200 TOBACCO DEPENDENCE: ICD-10-CM

## 2023-07-13 DIAGNOSIS — I50.9 CONGESTIVE HEART FAILURE, UNSPECIFIED HF CHRONICITY, UNSPECIFIED HEART FAILURE TYPE: ICD-10-CM

## 2023-07-13 DIAGNOSIS — N18.31 STAGE 3A CHRONIC KIDNEY DISEASE: ICD-10-CM

## 2023-07-13 DIAGNOSIS — Z13.31 POSITIVE DEPRESSION SCREENING: ICD-10-CM

## 2023-07-13 DIAGNOSIS — M06.09 RHEUMATOID ARTHRITIS OF MULTIPLE SITES WITH NEGATIVE RHEUMATOID FACTOR: ICD-10-CM

## 2023-07-13 PROCEDURE — 3077F PR MOST RECENT SYSTOLIC BLOOD PRESSURE >= 140 MM HG: ICD-10-PCS | Mod: CPTII,S$GLB,, | Performed by: INTERNAL MEDICINE

## 2023-07-13 PROCEDURE — 3077F SYST BP >= 140 MM HG: CPT | Mod: CPTII,S$GLB,, | Performed by: INTERNAL MEDICINE

## 2023-07-13 PROCEDURE — 99215 OFFICE O/P EST HI 40 MIN: CPT | Mod: S$GLB,,, | Performed by: INTERNAL MEDICINE

## 2023-07-13 PROCEDURE — 3008F BODY MASS INDEX DOCD: CPT | Mod: CPTII,S$GLB,, | Performed by: INTERNAL MEDICINE

## 2023-07-13 PROCEDURE — 4010F ACE/ARB THERAPY RXD/TAKEN: CPT | Mod: CPTII,S$GLB,, | Performed by: INTERNAL MEDICINE

## 2023-07-13 PROCEDURE — 99215 PR OFFICE/OUTPT VISIT, EST, LEVL V, 40-54 MIN: ICD-10-PCS | Mod: S$GLB,,, | Performed by: INTERNAL MEDICINE

## 2023-07-13 PROCEDURE — 3079F PR MOST RECENT DIASTOLIC BLOOD PRESSURE 80-89 MM HG: ICD-10-PCS | Mod: CPTII,S$GLB,, | Performed by: INTERNAL MEDICINE

## 2023-07-13 PROCEDURE — 1159F PR MEDICATION LIST DOCUMENTED IN MEDICAL RECORD: ICD-10-PCS | Mod: CPTII,S$GLB,, | Performed by: INTERNAL MEDICINE

## 2023-07-13 PROCEDURE — 3079F DIAST BP 80-89 MM HG: CPT | Mod: CPTII,S$GLB,, | Performed by: INTERNAL MEDICINE

## 2023-07-13 PROCEDURE — 1159F MED LIST DOCD IN RCRD: CPT | Mod: CPTII,S$GLB,, | Performed by: INTERNAL MEDICINE

## 2023-07-13 PROCEDURE — 4010F PR ACE/ARB THEARPY RXD/TAKEN: ICD-10-PCS | Mod: CPTII,S$GLB,, | Performed by: INTERNAL MEDICINE

## 2023-07-13 PROCEDURE — 1160F PR REVIEW ALL MEDS BY PRESCRIBER/CLIN PHARMACIST DOCUMENTED: ICD-10-PCS | Mod: CPTII,S$GLB,, | Performed by: INTERNAL MEDICINE

## 2023-07-13 PROCEDURE — 3008F PR BODY MASS INDEX (BMI) DOCUMENTED: ICD-10-PCS | Mod: CPTII,S$GLB,, | Performed by: INTERNAL MEDICINE

## 2023-07-13 PROCEDURE — 1160F RVW MEDS BY RX/DR IN RCRD: CPT | Mod: CPTII,S$GLB,, | Performed by: INTERNAL MEDICINE

## 2023-07-13 RX ORDER — LIDOCAINE 50 MG/G
PATCH TOPICAL
COMMUNITY
Start: 2023-02-24

## 2023-07-13 RX ORDER — PANTOPRAZOLE SODIUM 40 MG/1
40 TABLET, DELAYED RELEASE ORAL DAILY
Qty: 90 TABLET | Refills: 1 | Status: SHIPPED | OUTPATIENT
Start: 2023-07-13 | End: 2023-08-30 | Stop reason: SDUPTHER

## 2023-07-13 RX ORDER — SULFASALAZINE 500 MG/1
500 TABLET, DELAYED RELEASE ORAL 2 TIMES DAILY
Qty: 60 TABLET | Refills: 4 | Status: SHIPPED | OUTPATIENT
Start: 2023-07-13 | End: 2023-11-15 | Stop reason: SDUPTHER

## 2023-07-13 RX ORDER — PANTOPRAZOLE SODIUM 40 MG/1
TABLET, DELAYED RELEASE ORAL
COMMUNITY
End: 2023-07-13 | Stop reason: SDUPTHER

## 2023-07-13 RX ORDER — FUROSEMIDE 20 MG/1
20 TABLET ORAL
COMMUNITY
Start: 2023-06-17 | End: 2023-07-13 | Stop reason: SDUPTHER

## 2023-07-13 RX ORDER — METOPROLOL SUCCINATE 50 MG/1
50 TABLET, EXTENDED RELEASE ORAL DAILY
Qty: 90 TABLET | Refills: 1 | Status: SHIPPED | OUTPATIENT
Start: 2023-07-13 | End: 2023-08-30 | Stop reason: SDUPTHER

## 2023-07-13 RX ORDER — FUROSEMIDE 20 MG/1
20 TABLET ORAL
Qty: 90 TABLET | Refills: 1 | Status: SHIPPED | OUTPATIENT
Start: 2023-07-13 | End: 2023-08-30 | Stop reason: SDUPTHER

## 2023-07-13 NOTE — PROGRESS NOTES
"  Subjective:       Patient ID: Lilly Loera is a 48 y.o. female.    Chief Complaint: Establish Care (Former pt. Of Barb Jaramillo,NP to est care.  H/O bilateral clubbed feet that causes ankle deformity, pain that makes knees/legs/hips hurt.  Has to use walker/cane on bad days.  Saw orthopedic ([facility] [address] that sent her to PT, but she was not happy with her.  C/o hydronitis/"boils" due to wt. Loss, ust to weigh over 500lbs.  Saw derm. On [address] And they advised having it removed, but never referred her to get procedure done.  Needs Ref. To a new rheumatologist, was seeing Dr. Asencio.)    HPI    48 y.o. female with Active Diagnosis Review (HCC)     Chronic             HCC 22 - Morbid Obesity     Obesity, morbid [E66.01]     HCC 40 - Rheumatoid Arthritis and Inflammatory Connective Tissue Disease       Rheumatoid arthritis of multiple sites with negative rheumatoid factor   [M06.09]     RA (rheumatoid arthritis) [M06.9]     HCC 47 - Disorders of Immunity     Drug-induced immunodeficiency [D84.821, Z79.899]     HCC 59 - Major Depressive, Bipolar, and Paranoid Disorders     Mild recurrent major depression [F33.0]     HCC 85 - Congestive Heart Failure     Congestive heart failure, unspecified HF chronicity, unspecified heart   failure type [I50.9]     CHF (congestive heart failure) [I50.9]      - Chronic Kidney Disease, Moderate (Stage 3)     Stage 3a chronic kidney disease [N18.31]           here to establish care.         Health Maintenance Topics with due status: Not Due       Topic Last Completion Date    Lipid Panel 02/11/2022    Influenza Vaccine 04/06/2023       Health Maintenance Due   Topic Date Due    COVID-19 Vaccine (1) Never done    Pneumococcal Vaccines (Age 0-64) (1 - PCV) Never done    HIV Screening  Never done    Mammogram  Never done    TETANUS VACCINE  10/28/2003    Hemoglobin A1c (Diabetic Prevention Screening)  Never done    Colorectal Cancer Screening  Never done " "        Medical Problems:      Patient Active Problem List   Diagnosis    Obesity, morbid    Bell's palsy    CHF (congestive heart failure)    Essential hypertension    Hiatal hernia with GERD and esophagitis    Osteoarthritis    RA (rheumatoid arthritis)    S/P gastric bypass    Stage 3a chronic kidney disease    Drug-induced immunodeficiency    Club foot of both lower extremities    Mild recurrent major depression       Current Outpatient Medications on File Prior to Visit   Medication Sig Dispense Refill    budesonide-formoterol 80-4.5 mcg (SYMBICORT) 80-4.5 mcg/actuation HFAA Inhale 2 puffs into the lungs. Controller      busPIRone (BUSPAR) 15 MG tablet Pt is to take 1/3 (5 mg) or 1/2 (7.5mgs) or one po HS PRN 30 tablet 11    calcium carbonate (OS-MICHELLE) 500 mg calcium (1,250 mg) chewable tablet Take 1 tablet by mouth once daily.      gabapentin (NEURONTIN) 300 MG capsule Take 1 capsule (300 mg total) by mouth 3 (three) times daily. 90 capsule 3    LIDOcaine (LIDODERM) 5 % APPLY ONE PATCH TOPICALLY TO CLEAN, DRY SKIN. LEAVE ON FOR UP TO 12 HOURS THEN REMOVE. MUST WAIT AT LEAST 12 HOURS BEFORE APPLYING PATCH(ES) AGAIN.      multivitamin capsule Take 1 capsule by mouth once daily.      mupirocin (BACTROBAN) 2 % ointment 3 (three) times daily. Apply to affected area      sacubitriL-valsartan (ENTRESTO)  mg per tablet Take 1 tablet by mouth 2 (two) times daily. 60 tablet 5    SURE COMFORT INSULIN SYRINGE 1 mL 29 gauge x 1/2" Syrg       traMADoL (ULTRAM) 50 mg tablet Take 1 tablet (50 mg total) by mouth 3 (three) times daily as needed (moderate to severe pain). 90 tablet 0    [DISCONTINUED] furosemide (LASIX) 20 MG tablet Take 20 mg by mouth as needed. as directed.      [DISCONTINUED] metoprolol succinate (TOPROL-XL) 50 MG 24 hr tablet Take 50 mg by mouth.      [DISCONTINUED] pantoprazole (PROTONIX) 40 MG tablet 1 tab(s) orally once a day for 30 day(s)      [DISCONTINUED] sulfaSALAzine (AZULFIDINE) 500 MG EC " tablet Take 1 tablet (500 mg total) by mouth 2 (two) times daily. 60 tablet 4    [DISCONTINUED] buPROPion HCL, smoking deter, (ZYBAN) 150 mg TBSR 12 hr tablet Take 1 tablet (150 mg total) by mouth every 12 (twelve) hours. 60 tablet 3    [DISCONTINUED] cholecalciferol, vitamin D3, 125 mcg (5,000 unit) Tab       [DISCONTINUED] cyanocobalamin 1,000 mcg/mL injection Inject 1,000 mcg as directed.      [DISCONTINUED] pantoprazole (PROTONIX) 40 MG tablet Take 1 tablet (40 mg total) by mouth once daily. 90 tablet 3     No current facility-administered medications on file prior to visit.           Past Medical History:   Diagnosis Date    Anemia     Anxiety     Arthritis     CHF (congestive heart failure)     Depression     Hidradenitis suppurativa     Hypertension     Polyneuropathy      Past Surgical History:   Procedure Laterality Date    club foot correction      gall blader removed      GASTRIC BYPASS      gastric sleeve      HERNIA REPAIR      HYSTERECTOMY      partial    meniscus repair surgery      repaired ac1      under arm graphs       Family History   Problem Relation Age of Onset    Hypertension Mother     Arthritis Mother     Hypertension Father     Arthritis Father     Alcohol abuse Father     Heart disease Father      Social History     Socioeconomic History    Marital status:    Tobacco Use    Smoking status: Every Day     Packs/day: 0.50     Types: Cigarettes     Passive exposure: Never    Smokeless tobacco: Never    Tobacco comments:     Pt enrolled in smoking cessation program   Substance and Sexual Activity    Alcohol use: Yes    Drug use: Not Currently    Sexual activity: Not Currently     Review of patient's allergies indicates:   Allergen Reactions    Adhesive Blisters, Hives, Itching and Swelling    Adhesive tape-silicones      whelp and redness    Darvocet a500 [propoxyphene n-acetaminophen]     Demerol [meperidine]        Current Outpatient Medications:     budesonide-formoterol 80-4.5 mcg  "(SYMBICORT) 80-4.5 mcg/actuation HFAA, Inhale 2 puffs into the lungs. Controller, Disp: , Rfl:     busPIRone (BUSPAR) 15 MG tablet, Pt is to take 1/3 (5 mg) or 1/2 (7.5mgs) or one po HS PRN, Disp: 30 tablet, Rfl: 11    calcium carbonate (OS-MICHELLE) 500 mg calcium (1,250 mg) chewable tablet, Take 1 tablet by mouth once daily., Disp: , Rfl:     gabapentin (NEURONTIN) 300 MG capsule, Take 1 capsule (300 mg total) by mouth 3 (three) times daily., Disp: 90 capsule, Rfl: 3    LIDOcaine (LIDODERM) 5 %, APPLY ONE PATCH TOPICALLY TO CLEAN, DRY SKIN. LEAVE ON FOR UP TO 12 HOURS THEN REMOVE. MUST WAIT AT LEAST 12 HOURS BEFORE APPLYING PATCH(ES) AGAIN., Disp: , Rfl:     multivitamin capsule, Take 1 capsule by mouth once daily., Disp: , Rfl:     mupirocin (BACTROBAN) 2 % ointment, 3 (three) times daily. Apply to affected area, Disp: , Rfl:     sacubitriL-valsartan (ENTRESTO)  mg per tablet, Take 1 tablet by mouth 2 (two) times daily., Disp: 60 tablet, Rfl: 5    SURE COMFORT INSULIN SYRINGE 1 mL 29 gauge x 1/2" Syrg, , Disp: , Rfl:     traMADoL (ULTRAM) 50 mg tablet, Take 1 tablet (50 mg total) by mouth 3 (three) times daily as needed (moderate to severe pain)., Disp: 90 tablet, Rfl: 0    furosemide (LASIX) 20 MG tablet, Take 1 tablet (20 mg total) by mouth as needed (swelling, weight gain). as directed., Disp: 90 tablet, Rfl: 1    metoprolol succinate (TOPROL-XL) 50 MG 24 hr tablet, Take 1 tablet (50 mg total) by mouth once daily., Disp: 90 tablet, Rfl: 1    pantoprazole (PROTONIX) 40 MG tablet, Take 1 tablet (40 mg total) by mouth once daily., Disp: 90 tablet, Rfl: 1    sulfaSALAzine (AZULFIDINE) 500 MG EC tablet, Take 1 tablet (500 mg total) by mouth 2 (two) times daily., Disp: 60 tablet, Rfl: 4        Review of Systems   Constitutional:  Positive for activity change. Negative for fever.   HENT:  Negative for trouble swallowing.    Respiratory:  Negative for shortness of breath.    Cardiovascular:  Negative for chest pain. " "  Gastrointestinal:  Negative for blood in stool.   Musculoskeletal:  Positive for arthralgias and joint swelling.   Skin:  Positive for rash.   Neurological:  Negative for syncope.   Psychiatric/Behavioral:  Positive for dysphoric mood.      Objective:        Vitals:    07/13/23 1435   BP: (!) 140/88   BP Location: Left arm   Patient Position: Sitting   BP Method: Large (Manual)   Pulse: 85   Resp: 15   Temp: 97.3 °F (36.3 °C)   TempSrc: Temporal   Weight: (!) 142.8 kg (314 lb 12.8 oz)   Height: 5' 7" (1.702 m)       Body mass index is 49.3 kg/m².    Physical Exam  Constitutional:       General: She is not in acute distress.     Appearance: She is well-developed. She is not diaphoretic.   HENT:      Head: Normocephalic and atraumatic.      Right Ear: External ear normal.      Left Ear: External ear normal.   Cardiovascular:      Rate and Rhythm: Normal rate and regular rhythm.   Pulmonary:      Effort: Pulmonary effort is normal.      Breath sounds: Normal breath sounds.   Abdominal:      Palpations: Abdomen is soft.   Musculoskeletal:      Cervical back: Neck supple.   Skin:     General: Skin is warm and dry.      Findings: Rash present.      Nails: There is no clubbing.   Neurological:      Mental Status: She is alert.   Psychiatric:         Mood and Affect: Mood is anxious.       Assessment:     1. Encounter to establish care    2. Colon cancer screening    3. Essential hypertension    4. Stage 3a chronic kidney disease    5. Other long term (current) drug therapy    6. Drug-induced immunodeficiency    7. Obesity, morbid    8. Congestive heart failure, unspecified HF chronicity, unspecified heart failure type    9. Positive depression screening    10. Rheumatoid arthritis of multiple sites with negative rheumatoid factor    11. Club foot of both lower extremities    12. Mild recurrent major depression           Plan:         1. Encounter to establish care  - reviewed healthcare maintenance and pt's chronic " medical problems.   - labs reviewed, ordered  - immunizations reviewed  - CRC screen due  - mammo utd  - CBC Auto Differential  - Comprehensive Metabolic Panel  - Hemoglobin A1C  - TSH  - T4, Free  - Lipid panel; Future    2. Colon cancer screening    - Ambulatory referral/consult to General Surgery; Future    3. Essential hypertension  - cont current meds. Elevated in clinic today but within good range at home on digital htn program  - TSH  - T4, Free    4. Stage 3a chronic kidney disease  - chronic, stable. Monitor w/ labs. Oral hydration. Avoid nsaids.    - Comprehensive Metabolic Panel    5. Other long term (current) drug therapy    - CBC Auto Differential  - Comprehensive Metabolic Panel  - Hemoglobin A1C  - TSH  - T4, Free  - Lipid panel; Future    6. Drug-induced immunodeficiency  - on RA meds per rheumatology    7. Obesity, morbid  - encourage weight loss through healthy lifestyle including diet and exercise      8. Congestive heart failure, unspecified HF chronicity, unspecified heart failure type  - per cardiologist    9. Positive depression screening  I have reviewed the positive depression score which warrants active treatment with psychotherapy and/or medications.     10. Rheumatoid arthritis of multiple sites with negative rheumatoid factor    - Ambulatory referral/consult to Rheumatology; Future  - sulfaSALAzine (AZULFIDINE) 500 MG EC tablet; Take 1 tablet (500 mg total) by mouth 2 (two) times daily.  Dispense: 60 tablet; Refill: 4    11. Club foot of both lower extremities    - Ambulatory referral/consult to Orthopedics; Future    12. Mild recurrent major depression  I have reviewed the positive depression score which warrants active treatment with psychotherapy and/or medications.             Unless there are intervening problems, Follow up in about 6 months (around 1/13/2024), or if symptoms worsen or fail to improve, for follow up.      Patient note was created using MModal Dictation.  Any errors  in syntax or even information may not have been identified and edited on initial review prior to signing this note.    I spent a total of 52 minutes on the day of the visit.        This includes face to face time and non-face to face time preparing to see the patient (eg, review of tests), obtaining and/or reviewing separately obtained history, documenting clinical information in the electronic or other health record, independently interpreting results and communicating results to the patient/family/caregiver, or care coordinator.I have reviewed the positive depression score which warrants active treatment with psychotherapy and/or medications.

## 2023-07-14 ENCOUNTER — PATIENT OUTREACH (OUTPATIENT)
Dept: ADMINISTRATIVE | Facility: HOSPITAL | Age: 48
End: 2023-07-14
Payer: MEDICARE

## 2023-07-14 ENCOUNTER — PATIENT MESSAGE (OUTPATIENT)
Dept: FAMILY MEDICINE | Facility: CLINIC | Age: 48
End: 2023-07-14
Payer: MEDICARE

## 2023-07-14 LAB
ABS NRBC COUNT: 0 THOU/UL (ref 0–0.01)
ABSOLUTE BASOPHIL: 0 10*3/UL (ref 0–0.3)
ABSOLUTE EOSINOPHIL: 0.1 10*3/UL (ref 0–0.6)
ABSOLUTE IMMATURE GRAN: 0.01 THOU/UL (ref 0–0.03)
ABSOLUTE LYMPHOCYTE: 2.4 10*3/UL (ref 1.2–4)
ABSOLUTE MONOCYTE: 0.3 10*3/UL (ref 0.1–0.8)
BASOPHILS NFR BLD: 0.4 % (ref 0–3)
EOSINOPHIL NFR BLD: 2.7 % (ref 0–6)
ERYTHROCYTE [DISTWIDTH] IN BLOOD BY AUTOMATED COUNT: 13.7 % (ref 0–15.5)
ESTIMATED AVG GLUCOSE: 104 MG/DL
HBA1C MFR BLD: 5.1 % (ref 4.2–6.3)
HCT VFR BLD AUTO: 37.8 % (ref 37–47)
HGB BLD-MCNC: 11.8 G/DL (ref 12–16)
IMMATURE GRANULOCYTES: 0.2 % (ref 0–0.43)
LYMPHOCYTES NFR BLD: 49.8 % (ref 20–45)
MCH RBC QN AUTO: 29.6 PG (ref 27–32)
MCHC RBC AUTO-ENTMCNC: 31.2 % (ref 32–36)
MCV RBC AUTO: 95 FL (ref 80–99)
MONOCYTES NFR BLD: 5.2 % (ref 2–10)
NEUTROPHILS # BLD AUTO: 2 10*3/UL (ref 1.4–7)
NEUTROPHILS NFR BLD: 41.7 % (ref 50–80)
NUCLEATED RED BLOOD CELLS: 0 % (ref 0–0.2)
PLATELETS: 255 10*3/UL (ref 130–400)
PMV BLD AUTO: 11.2 FL (ref 9.2–12.2)
RBC # BLD AUTO: 3.98 10*6/UL (ref 4.2–5.4)
WBC # BLD: 4.8 10*3/UL (ref 4.5–10)

## 2023-07-17 LAB
ALBUMIN SERPL BCP-MCNC: 3.6 G/DL (ref 3.4–5)
ALP SERPL-CCNC: 75 U/L (ref 45–117)
ALT SERPL W P-5'-P-CCNC: 23 U/L (ref 13–56)
ANION GAP SERPL CALC-SCNC: 2 MMOL/L (ref 3–11)
AST SERPL-CCNC: 31 U/L (ref 15–37)
BILIRUB SERPL-MCNC: 0.5 MG/DL (ref 0.2–1)
BUN SERPL-MCNC: 17 MG/DL (ref 7–18)
BUN/CREAT SERPL: 18.27 RATIO
CALCIUM SERPL-MCNC: 8.8 MG/DL (ref 8.5–10.1)
CHLORIDE SERPL-SCNC: 110 MMOL/L (ref 98–107)
CHOLEST SERPL-MSCNC: 146 MG/DL
CO2 SERPL-SCNC: 26 MMOL/L (ref 21–32)
CREAT SERPL-MCNC: 0.93 MG/DL (ref 0.55–1.02)
GFR ESTIMATION: > 60
GLUCOSE SERPL-MCNC: 91 MG/DL (ref 74–106)
HDLC SERPL-MCNC: 41 MG/DL
LDLC SERPL CALC-MCNC: 93.2 MG/DL
POTASSIUM SERPL-SCNC: 4.1 MMOL/L (ref 3.5–5.1)
PROT SERPL-MCNC: 7.5 G/DL (ref 6.4–8.2)
SODIUM BLD-SCNC: 138 MMOL/L (ref 131–143)
T4 FREE SP9 P CHAL SERPL-SCNC: 1.1 NG/DL (ref 0.76–1.46)
TRIGL SERPL-MCNC: 59 MG/DL (ref 0–149)
TSH SERPL DL<=0.005 MIU/L-ACNC: 2.33 UIU/ML (ref 0.36–3.74)
VLDL CHOLESTEROL: 12 MG/DL

## 2023-07-18 NOTE — PROGRESS NOTES
Your results have been reviewed.  Electrolytes, liver and kidney function are all within good range. Blood count is stable. Cholesterol is in good range. Blood sugar is also within normal limits so no evidence of diabetes. The thyroid hormone is in good range.    Please call if you have any questions or concerns.    Sincerely,   Dr. Jones

## 2023-07-24 DIAGNOSIS — Q66.89 CLUB FOOT OF BOTH LOWER EXTREMITIES: Primary | ICD-10-CM

## 2023-07-27 NOTE — PROGRESS NOTES
Fax from Dr. Barney's office received regarding referral. States he is unable to see patient. Recommends either Dr. Fink or Dr. Cook - podiatry.   Referral to Dr. Fink ordered.

## 2023-08-07 ENCOUNTER — TELEPHONE (OUTPATIENT)
Dept: FAMILY MEDICINE | Facility: CLINIC | Age: 48
End: 2023-08-07
Payer: MEDICARE

## 2023-08-08 ENCOUNTER — TELEPHONE (OUTPATIENT)
Dept: FAMILY MEDICINE | Facility: CLINIC | Age: 48
End: 2023-08-08
Payer: MEDICARE

## 2023-08-08 DIAGNOSIS — M06.09 RHEUMATOID ARTHRITIS OF MULTIPLE SITES WITH NEGATIVE RHEUMATOID FACTOR: Primary | ICD-10-CM

## 2023-08-08 NOTE — TELEPHONE ENCOUNTER
"Spoke with Dr. Cha's officed regarding the denial for this pt.  I asked why he denied seeing this pt and office staff said, "he does not put why, he only puts yes or no".    Pt. Needs to be referred to a different rheumatologist.  "

## 2023-08-22 ENCOUNTER — TELEPHONE (OUTPATIENT)
Dept: FAMILY MEDICINE | Facility: CLINIC | Age: 48
End: 2023-08-22
Payer: MEDICARE

## 2023-08-22 ENCOUNTER — PATIENT MESSAGE (OUTPATIENT)
Dept: FAMILY MEDICINE | Facility: CLINIC | Age: 48
End: 2023-08-22
Payer: MEDICARE

## 2023-08-22 DIAGNOSIS — E66.01 OBESITY, MORBID: Primary | ICD-10-CM

## 2023-08-22 DIAGNOSIS — F41.9 ANXIETY: ICD-10-CM

## 2023-08-22 DIAGNOSIS — Z12.31 VISIT FOR SCREENING MAMMOGRAM: Primary | ICD-10-CM

## 2023-08-22 DIAGNOSIS — F17.210 CIGARETTE NICOTINE DEPENDENCE, UNCOMPLICATED: ICD-10-CM

## 2023-08-22 RX ORDER — SEMAGLUTIDE 0.68 MG/ML
INJECTION, SOLUTION SUBCUTANEOUS
Qty: 3 ML | Refills: 0 | Status: SHIPPED | OUTPATIENT
Start: 2023-08-22 | End: 2023-08-30 | Stop reason: SDUPTHER

## 2023-08-22 RX ORDER — BUPROPION HYDROCHLORIDE 150 MG/1
150 TABLET, FILM COATED, EXTENDED RELEASE ORAL EVERY 12 HOURS
Qty: 60 TABLET | Refills: 3 | Status: SHIPPED | OUTPATIENT
Start: 2023-08-22 | End: 2023-09-07

## 2023-08-22 RX ORDER — BUSPIRONE HYDROCHLORIDE 15 MG/1
TABLET ORAL
Qty: 30 TABLET | Refills: 2 | Status: SHIPPED | OUTPATIENT
Start: 2023-08-22 | End: 2023-08-30 | Stop reason: SDUPTHER

## 2023-08-22 NOTE — TELEPHONE ENCOUNTER
----- Message from Lilly Khan MA sent at 8/22/2023  3:54 PM CDT -----  Contact: Lilly    ----- Message -----  From: Ángela Colin  Sent: 8/22/2023   3:35 PM CDT  To: Karen Tovar Staff    Patient is calling in regards to orders. Reports needing annual mammogram orders placed. Please return call to .[phone].

## 2023-08-22 NOTE — TELEPHONE ENCOUNTER
Pt. Needs RF of Buspar and wants back on Welbutrin.    States she ran out of both meds and is smoking more due to anxiety.

## 2023-08-30 DIAGNOSIS — E66.01 OBESITY, MORBID: ICD-10-CM

## 2023-08-30 DIAGNOSIS — F41.9 ANXIETY: ICD-10-CM

## 2023-08-30 RX ORDER — METOPROLOL SUCCINATE 50 MG/1
50 TABLET, EXTENDED RELEASE ORAL DAILY
Qty: 90 TABLET | Refills: 1 | Status: SHIPPED | OUTPATIENT
Start: 2023-08-30 | End: 2023-11-15 | Stop reason: SDUPTHER

## 2023-08-30 RX ORDER — SEMAGLUTIDE 0.68 MG/ML
INJECTION, SOLUTION SUBCUTANEOUS
Qty: 3 ML | Refills: 0 | Status: SHIPPED | OUTPATIENT
Start: 2023-08-30 | End: 2023-11-07

## 2023-08-30 RX ORDER — FUROSEMIDE 20 MG/1
20 TABLET ORAL
Qty: 90 TABLET | Refills: 1 | Status: SHIPPED | OUTPATIENT
Start: 2023-08-30

## 2023-08-30 RX ORDER — BUSPIRONE HYDROCHLORIDE 15 MG/1
TABLET ORAL
Qty: 30 TABLET | Refills: 2 | Status: SHIPPED | OUTPATIENT
Start: 2023-08-30 | End: 2023-11-15 | Stop reason: SDUPTHER

## 2023-08-30 RX ORDER — PANTOPRAZOLE SODIUM 40 MG/1
40 TABLET, DELAYED RELEASE ORAL DAILY
Qty: 90 TABLET | Refills: 1 | Status: SHIPPED | OUTPATIENT
Start: 2023-08-30 | End: 2023-11-15 | Stop reason: SDUPTHER

## 2023-09-04 ENCOUNTER — PATIENT MESSAGE (OUTPATIENT)
Dept: ADMINISTRATIVE | Facility: OTHER | Age: 48
End: 2023-09-04
Payer: MEDICARE

## 2023-09-05 DIAGNOSIS — I50.9 CONGESTIVE HEART FAILURE, UNSPECIFIED HF CHRONICITY, UNSPECIFIED HEART FAILURE TYPE: ICD-10-CM

## 2023-09-05 DIAGNOSIS — I10 ESSENTIAL HYPERTENSION: ICD-10-CM

## 2023-09-05 NOTE — TELEPHONE ENCOUNTER
----- Message from Sharon Marie sent at 9/5/2023  8:41 AM CDT -----  Contact: Roz ([facility])  Type:  RX Refill Request    Who Called: Roz  Refill or New Rx:refill   RX Name and Strength:sacubitriL-valsartan (ENTRESTO)  mg per tablet  How is the patient currently taking it? (ex. 1XDay):Take 1 tablet by mouth 2 (two) times daily  Is this a 30 day or 90 day RX:90  Preferred Pharmacy with phone number:  [facility], [address] [place], [address], [phone], fax [phone](electronic) and [phone](paper)  Local or Mail Order:mail order  Ordering Provider:Barb Jaramillo   Would the patient rather a call back or a response via [facility]? Call back   Best Call Back Number:[phone]  Additional Information:

## 2023-09-06 ENCOUNTER — PATIENT OUTREACH (OUTPATIENT)
Dept: ADMINISTRATIVE | Facility: HOSPITAL | Age: 48
End: 2023-09-06
Payer: MEDICARE

## 2023-09-07 ENCOUNTER — CLINICAL SUPPORT (OUTPATIENT)
Dept: SURGERY | Facility: CLINIC | Age: 48
End: 2023-09-07
Payer: MEDICARE

## 2023-09-07 VITALS — WEIGHT: 293 LBS | HEIGHT: 67 IN | BODY MASS INDEX: 45.99 KG/M2

## 2023-09-07 DIAGNOSIS — Z12.11 COLON CANCER SCREENING: Primary | ICD-10-CM

## 2023-09-07 DIAGNOSIS — Z12.11 SCREENING FOR COLON CANCER: Primary | ICD-10-CM

## 2023-09-07 RX ORDER — ONDANSETRON 4 MG/1
4 TABLET, ORALLY DISINTEGRATING ORAL EVERY 8 HOURS
COMMUNITY
Start: 2023-08-27 | End: 2023-11-15

## 2023-09-07 RX ORDER — BUPROPION HYDROCHLORIDE 150 MG/1
150 TABLET, EXTENDED RELEASE ORAL EVERY 12 HOURS
COMMUNITY
Start: 2023-08-22 | End: 2023-11-15 | Stop reason: SDUPTHER

## 2023-09-07 RX ORDER — CALCIUM CITRATE/VITAMIN D3 500MG-12.5
1 TABLET,CHEWABLE ORAL
COMMUNITY
Start: 2023-04-05

## 2023-09-07 NOTE — PROGRESS NOTES
[place] [facility]  [address] [address]  Phone: [phone]  Fax: [phone]    History & Physical         Provider: Kota Londono    Patient Name: Lilly BROOKS (age):1975  48 y.o.           Gender: female   Phone: [phone]     Referring Physician: La Jones     Vital Signs:   Height - 5'7  Weight - 312 lb  BMI -  48.87    Plan: Colonoscopy    Encounter Diagnosis   Name Primary?    Colon cancer screening Yes           History:      Past Medical History:   Diagnosis Date    Anemia     Anxiety     Arthritis     CHF (congestive heart failure)     Depression     Hidradenitis suppurativa     Hypertension     Polyneuropathy       Past Surgical History:   Procedure Laterality Date    club foot correction      gall blader removed      GASTRIC BYPASS      gastric sleeve      HERNIA REPAIR      HYSTERECTOMY      partial    meniscus repair surgery      repaired ac1      under arm graphs        Medication List with Changes/Refills   Current Medications    BUDESONIDE-FORMOTEROL 80-4.5 MCG (SYMBICORT) 80-4.5 MCG/ACTUATION HFAA    Inhale 2 puffs into the lungs. Controller    BUPROPION (WELLBUTRIN SR) 150 MG TBSR 12 HR TABLET    Take 150 mg by mouth every 12 (twelve) hours.    BUSPIRONE (BUSPAR) 15 MG TABLET    Pt is to take 1/3 (5 mg) or 1/2 (7.5mgs) or one po HS PRN    CALCIUM CARBONATE (OS-MICHELLE) 500 MG CALCIUM (1,250 MG) CHEWABLE TABLET    Take 1 tablet by mouth once daily.    CALCIUM CITRATE-VITAMIN D3 500 MG-12.5 MCG (500 UNIT) CHEW    Take 1 tablet by mouth.    FUROSEMIDE (LASIX) 20 MG TABLET    Take 1 tablet (20 mg total) by mouth as needed (swelling, weight gain). as directed.    GABAPENTIN (NEURONTIN) 300 MG CAPSULE    Take 1 capsule (300 mg total) by mouth 3 (three) times daily.    LIDOCAINE (LIDODERM) 5 %    APPLY ONE PATCH TOPICALLY TO CLEAN, DRY SKIN. LEAVE ON FOR UP TO 12  "HOURS THEN REMOVE. MUST WAIT AT LEAST 12 HOURS BEFORE APPLYING PATCH(ES) AGAIN.    METOPROLOL SUCCINATE (TOPROL-XL) 50 MG 24 HR TABLET    Take 1 tablet (50 mg total) by mouth once daily.    MULTIVITAMIN CAPSULE    Take 1 capsule by mouth once daily.    MUPIROCIN (BACTROBAN) 2 % OINTMENT    3 (three) times daily. Apply to affected area    ONDANSETRON (ZOFRAN-ODT) 4 MG TBDL    Take 4 mg by mouth every 8 (eight) hours.    PANTOPRAZOLE (PROTONIX) 40 MG TABLET    Take 1 tablet (40 mg total) by mouth once daily.    SACUBITRIL-VALSARTAN (ENTRESTO)  MG PER TABLET    Take 1 tablet by mouth 2 (two) times daily.    SEMAGLUTIDE (OZEMPIC) 0.25 MG OR 0.5 MG (2 MG/3 ML) PEN INJECTOR    Inject 0.25mg into the skin every 7 days x 4 doses then inject 0.5mg into the skin every 7 days x 4 doses    SULFASALAZINE (AZULFIDINE) 500 MG EC TABLET    Take 1 tablet (500 mg total) by mouth 2 (two) times daily.    SURE COMFORT INSULIN SYRINGE 1 ML 29 GAUGE X 1/2" SYRG        TRAMADOL (ULTRAM) 50 MG TABLET    Take 1 tablet (50 mg total) by mouth 3 (three) times daily as needed (moderate to severe pain).   Discontinued Medications    BUPROPION HCL, SMOKING DETER, (ZYBAN) 150 MG TBSR 12 HR TABLET    Take 1 tablet (150 mg total) by mouth every 12 (twelve) hours.      Review of patient's allergies indicates:   Allergen Reactions    Adhesive Blisters, Hives, Itching and Swelling    Adhesive tape-silicones      whelp and redness    Darvocet a500 [propoxyphene n-acetaminophen]     Demerol [meperidine]       Family History   Problem Relation Age of Onset    Hypertension Mother     Arthritis Mother     Hypertension Father     Arthritis Father     Alcohol abuse Father     Heart disease Father       Social History     Tobacco Use    Smoking status: Every Day     Current packs/day: 0.50     Types: Cigarettes     Passive exposure: Never    Smokeless tobacco: Never    Tobacco comments:     Pt enrolled in smoking cessation program   Substance Use Topics "    Alcohol use: Yes    Drug use: Not Currently        Physical Examination:     General Appearance:___________________________  HEENT: _____________________________________  Abdomen:____________________________________  Heart:________________________________________  Lungs:_______________________________________  Extremities:___________________________________  Skin:_________________________________________  Endocrine:____________________________________  Genitourinary:_________________________________  Neurological:__________________________________      Patient has been evaluated immediately prior to sedation and is medically cleared for endoscopy with IVCS as an ASA class: ______      Physician Signature: _________________________       Date: ________  Time: ________

## 2023-09-15 ENCOUNTER — OUTSIDE PLACE OF SERVICE (OUTPATIENT)
Dept: SURGERY | Facility: CLINIC | Age: 48
End: 2023-09-15

## 2023-09-15 LAB — CRC RECOMMENDATION EXT: NORMAL

## 2023-09-15 PROCEDURE — G0121 COLON CA SCRN NOT HI RSK IND: ICD-10-PCS | Mod: ,,, | Performed by: SURGERY

## 2023-09-15 PROCEDURE — G0121 COLON CA SCRN NOT HI RSK IND: HCPCS | Mod: ,,, | Performed by: SURGERY

## 2023-09-16 ENCOUNTER — PATIENT MESSAGE (OUTPATIENT)
Dept: FAMILY MEDICINE | Facility: CLINIC | Age: 48
End: 2023-09-16
Payer: MEDICARE

## 2023-09-16 DIAGNOSIS — R21 RASH: Primary | ICD-10-CM

## 2023-09-18 ENCOUNTER — PATIENT MESSAGE (OUTPATIENT)
Dept: FAMILY MEDICINE | Facility: CLINIC | Age: 48
End: 2023-09-18
Payer: MEDICARE

## 2023-09-19 RX ORDER — NYSTATIN 100000 U/G
OINTMENT TOPICAL 2 TIMES DAILY
Qty: 30 G | Refills: 1 | Status: SHIPPED | OUTPATIENT
Start: 2023-09-19 | End: 2023-11-15

## 2023-09-22 ENCOUNTER — PATIENT OUTREACH (OUTPATIENT)
Dept: ADMINISTRATIVE | Facility: HOSPITAL | Age: 48
End: 2023-09-22
Payer: MEDICARE

## 2023-10-16 NOTE — TELEPHONE ENCOUNTER
----- Message from April Bae sent at 2/24/2023  8:45 AM CST -----  Regarding: Ector Peguero/Catrina  States she would like to schedule a hosp f/u. Nothing coming up on the schedule. States she is also interested in the Smoking Cessation. Please call pt [phone]. Thank you     General Sunscreen Counseling: I counseled the patient regarding the following:\\nSun screen (SPF 30 or greater) should be applied during peak UV exposure (between 10am and 2pm) and reapplied after exercise or swimming. Mineral sunscreens are recommended while pregnant.\\n Detail Level: Simple Products Recommended: ELTA MD UV physical, daily, or clear

## 2023-10-19 ENCOUNTER — PATIENT MESSAGE (OUTPATIENT)
Dept: FAMILY MEDICINE | Facility: CLINIC | Age: 48
End: 2023-10-19
Payer: MEDICARE

## 2023-10-19 DIAGNOSIS — B37.31 VAGINAL YEAST INFECTION: Primary | ICD-10-CM

## 2023-10-19 RX ORDER — FLUCONAZOLE 150 MG/1
150 TABLET ORAL ONCE
Qty: 2 TABLET | Refills: 0 | Status: SHIPPED | OUTPATIENT
Start: 2023-10-19 | End: 2023-10-19

## 2023-10-20 ENCOUNTER — TELEPHONE (OUTPATIENT)
Dept: FAMILY MEDICINE | Facility: CLINIC | Age: 48
End: 2023-10-20
Payer: MEDICARE

## 2023-10-20 DIAGNOSIS — B35.4 TINEA CORPORIS: Primary | ICD-10-CM

## 2023-10-20 NOTE — TELEPHONE ENCOUNTER
----- Message from Bárbara Oconnell MA sent at 10/19/2023  9:52 AM CDT -----  Contact: Catrina    ----- Message -----  From: Obey Blanco  Sent: 10/19/2023   9:45 AM CDT  To: Karen Fritz from [facility] is calling to schedule an hospital follow up for patient. Patient is being discharged today and soonest appt was with NP on 11/6/23. Please give patient a call at [phone]

## 2023-10-24 PROBLEM — B35.4 TINEA CORPORIS: Status: ACTIVE | Noted: 2023-10-24

## 2023-10-24 RX ORDER — NYSTATIN 100000 [USP'U]/G
POWDER TOPICAL 2 TIMES DAILY
Qty: 30 G | Refills: 1 | Status: SHIPPED | OUTPATIENT
Start: 2023-10-24

## 2023-10-24 NOTE — TELEPHONE ENCOUNTER
Notified pt her medication has been sent to pharmacy. Pt verbalized understanding.      Advised pt. That Dr. Jeffrey Washington denied ref.  Pt. States she will contact her ins. To find a rheumatologist in [place] and let us know.

## 2023-11-06 DIAGNOSIS — E66.01 OBESITY, MORBID: ICD-10-CM

## 2023-11-07 RX ORDER — SEMAGLUTIDE 0.68 MG/ML
INJECTION, SOLUTION SUBCUTANEOUS
Qty: 3 EACH | Refills: 10 | Status: SHIPPED | OUTPATIENT
Start: 2023-11-07

## 2023-11-15 ENCOUNTER — OFFICE VISIT (OUTPATIENT)
Dept: FAMILY MEDICINE | Facility: CLINIC | Age: 48
End: 2023-11-15
Payer: MEDICARE

## 2023-11-15 VITALS
DIASTOLIC BLOOD PRESSURE: 90 MMHG | HEIGHT: 67 IN | WEIGHT: 293 LBS | SYSTOLIC BLOOD PRESSURE: 132 MMHG | TEMPERATURE: 97 F | RESPIRATION RATE: 15 BRPM | HEART RATE: 80 BPM | BODY MASS INDEX: 45.99 KG/M2

## 2023-11-15 DIAGNOSIS — F33.0 MILD RECURRENT MAJOR DEPRESSION: ICD-10-CM

## 2023-11-15 DIAGNOSIS — K21.00 HIATAL HERNIA WITH GERD AND ESOPHAGITIS: ICD-10-CM

## 2023-11-15 DIAGNOSIS — I50.9 CONGESTIVE HEART FAILURE, UNSPECIFIED HF CHRONICITY, UNSPECIFIED HEART FAILURE TYPE: ICD-10-CM

## 2023-11-15 DIAGNOSIS — I10 ESSENTIAL HYPERTENSION: ICD-10-CM

## 2023-11-15 DIAGNOSIS — Z09 HOSPITAL DISCHARGE FOLLOW-UP: Primary | ICD-10-CM

## 2023-11-15 DIAGNOSIS — N18.31 STAGE 3A CHRONIC KIDNEY DISEASE: ICD-10-CM

## 2023-11-15 DIAGNOSIS — F17.210 CIGARETTE NICOTINE DEPENDENCE WITHOUT COMPLICATION: ICD-10-CM

## 2023-11-15 DIAGNOSIS — K44.9 HIATAL HERNIA WITH GERD AND ESOPHAGITIS: ICD-10-CM

## 2023-11-15 DIAGNOSIS — F41.9 ANXIETY: ICD-10-CM

## 2023-11-15 DIAGNOSIS — M06.09 RHEUMATOID ARTHRITIS OF MULTIPLE SITES WITH NEGATIVE RHEUMATOID FACTOR: ICD-10-CM

## 2023-11-15 PROCEDURE — 1160F RVW MEDS BY RX/DR IN RCRD: CPT | Mod: CPTII,S$GLB,, | Performed by: INTERNAL MEDICINE

## 2023-11-15 PROCEDURE — 1160F PR REVIEW ALL MEDS BY PRESCRIBER/CLIN PHARMACIST DOCUMENTED: ICD-10-PCS | Mod: CPTII,S$GLB,, | Performed by: INTERNAL MEDICINE

## 2023-11-15 PROCEDURE — 3080F DIAST BP >= 90 MM HG: CPT | Mod: CPTII,S$GLB,, | Performed by: INTERNAL MEDICINE

## 2023-11-15 PROCEDURE — 3075F SYST BP GE 130 - 139MM HG: CPT | Mod: CPTII,S$GLB,, | Performed by: INTERNAL MEDICINE

## 2023-11-15 PROCEDURE — 99215 PR OFFICE/OUTPT VISIT, EST, LEVL V, 40-54 MIN: ICD-10-PCS | Mod: S$GLB,,, | Performed by: INTERNAL MEDICINE

## 2023-11-15 PROCEDURE — 3008F BODY MASS INDEX DOCD: CPT | Mod: CPTII,S$GLB,, | Performed by: INTERNAL MEDICINE

## 2023-11-15 PROCEDURE — 3075F PR MOST RECENT SYSTOLIC BLOOD PRESS GE 130-139MM HG: ICD-10-PCS | Mod: CPTII,S$GLB,, | Performed by: INTERNAL MEDICINE

## 2023-11-15 PROCEDURE — 3080F PR MOST RECENT DIASTOLIC BLOOD PRESSURE >= 90 MM HG: ICD-10-PCS | Mod: CPTII,S$GLB,, | Performed by: INTERNAL MEDICINE

## 2023-11-15 PROCEDURE — 3044F PR MOST RECENT HEMOGLOBIN A1C LEVEL <7.0%: ICD-10-PCS | Mod: CPTII,S$GLB,, | Performed by: INTERNAL MEDICINE

## 2023-11-15 PROCEDURE — 99215 OFFICE O/P EST HI 40 MIN: CPT | Mod: S$GLB,,, | Performed by: INTERNAL MEDICINE

## 2023-11-15 PROCEDURE — 3008F PR BODY MASS INDEX (BMI) DOCUMENTED: ICD-10-PCS | Mod: CPTII,S$GLB,, | Performed by: INTERNAL MEDICINE

## 2023-11-15 PROCEDURE — 1159F MED LIST DOCD IN RCRD: CPT | Mod: CPTII,S$GLB,, | Performed by: INTERNAL MEDICINE

## 2023-11-15 PROCEDURE — 4010F ACE/ARB THERAPY RXD/TAKEN: CPT | Mod: CPTII,S$GLB,, | Performed by: INTERNAL MEDICINE

## 2023-11-15 PROCEDURE — 1159F PR MEDICATION LIST DOCUMENTED IN MEDICAL RECORD: ICD-10-PCS | Mod: CPTII,S$GLB,, | Performed by: INTERNAL MEDICINE

## 2023-11-15 PROCEDURE — 4010F PR ACE/ARB THEARPY RXD/TAKEN: ICD-10-PCS | Mod: CPTII,S$GLB,, | Performed by: INTERNAL MEDICINE

## 2023-11-15 PROCEDURE — 3044F HG A1C LEVEL LT 7.0%: CPT | Mod: CPTII,S$GLB,, | Performed by: INTERNAL MEDICINE

## 2023-11-15 RX ORDER — BUPROPION HYDROCHLORIDE 150 MG/1
150 TABLET, EXTENDED RELEASE ORAL EVERY 12 HOURS
Qty: 180 TABLET | Refills: 2 | Status: SHIPPED | OUTPATIENT
Start: 2023-11-15

## 2023-11-15 RX ORDER — BUSPIRONE HYDROCHLORIDE 15 MG/1
TABLET ORAL
Qty: 90 TABLET | Refills: 2 | Status: SHIPPED | OUTPATIENT
Start: 2023-11-15

## 2023-11-15 RX ORDER — HYDROCODONE BITARTRATE AND ACETAMINOPHEN 5; 325 MG/1; MG/1
TABLET ORAL
COMMUNITY
Start: 2023-10-20

## 2023-11-15 RX ORDER — PANTOPRAZOLE SODIUM 40 MG/1
40 TABLET, DELAYED RELEASE ORAL DAILY
Qty: 90 TABLET | Refills: 1 | Status: SHIPPED | OUTPATIENT
Start: 2023-11-15

## 2023-11-15 RX ORDER — SULFASALAZINE 500 MG/1
500 TABLET, DELAYED RELEASE ORAL 2 TIMES DAILY
Qty: 60 TABLET | Refills: 4 | Status: SHIPPED | OUTPATIENT
Start: 2023-11-15 | End: 2024-01-25 | Stop reason: SDUPTHER

## 2023-11-15 RX ORDER — METOPROLOL SUCCINATE 50 MG/1
50 TABLET, EXTENDED RELEASE ORAL DAILY
Qty: 90 TABLET | Refills: 1 | Status: SHIPPED | OUTPATIENT
Start: 2023-11-15 | End: 2024-01-22 | Stop reason: ALTCHOICE

## 2023-11-15 RX ORDER — ONDANSETRON 4 MG/1
TABLET, FILM COATED ORAL
COMMUNITY
Start: 2023-10-19

## 2023-11-15 NOTE — PROGRESS NOTES
"Subjective:       Patient ID: Lilly Loera is a 48 y.o. female.    Chief Complaint: Hospital Follow Up (Pt. Is here for hospital f/u.  Needs meds rf.  States "I always have problems")    HPI    48 y.o. female presents for hospital discharge follow up and for chronic medical problems:     Pt presented to ED on 10/18 for substernal pain eventually radiating to RUQ. Imaging notable for thickened gallbladder and pericholcystic fluid. Imaging and exam with positive Ha sign consistent for acute cholecystitis for which she was admitted for management. She underwent robotic cholecystectomy with Dr. Cedeno. She did well pos-operatively and was discharged on 10/19. She was prescribed augmentin to complete a 3 day course as outpatient. She has already had follow up with her surgeon.    HTN: entresto , metoprolol succinate 50mg  CHF: est w/ cardiology Dr. Bae  CKD3a: monitoring. Est w/ nephro Dr. Caruso. Last hospital labs improved.  GERD: pantoprazole 40mg  RA: sulfasalazine. Her rheumatologist left unexpectedly. Unable to est w/ new rheum d/t insurance.    Review of Systems   Constitutional:  Negative for fever.   Cardiovascular:  Negative for chest pain.   Gastrointestinal:  Negative for abdominal pain and vomiting.   Genitourinary:  Negative for decreased urine volume.   Musculoskeletal:  Positive for arthralgias.   Neurological:  Negative for syncope.   Psychiatric/Behavioral:  Positive for dysphoric mood. The patient is nervous/anxious.        Objective:        Vitals:    11/15/23 1043   BP: (!) 132/90   BP Location: Left arm   Patient Position: Sitting   BP Method: Large (Manual)   Pulse: 80   Resp: 15   Temp: 97.3 °F (36.3 °C)   TempSrc: Temporal   SpO2: Comment: unable to check due to fake nails   Weight: (!) 138.6 kg (305 lb 9.6 oz)   Height: 5' 7" (1.702 m)       Body mass index is 47.86 kg/m².    Physical Exam  Constitutional:       General: She is not in acute distress.     Appearance: She is " well-developed. She is not diaphoretic.   HENT:      Head: Normocephalic and atraumatic.      Right Ear: External ear normal.      Left Ear: External ear normal.   Cardiovascular:      Rate and Rhythm: Normal rate and regular rhythm.   Pulmonary:      Effort: Pulmonary effort is normal.      Breath sounds: Normal breath sounds. No wheezing.   Abdominal:      General: Bowel sounds are normal. There is no distension.      Palpations: Abdomen is soft.   Skin:     General: Skin is warm and dry.      Nails: There is no clubbing.   Neurological:      Mental Status: She is alert. Mental status is at baseline.   Psychiatric:         Behavior: Behavior normal.         Judgment: Judgment normal.         Assessment:     1. Hospital discharge follow-up    2. Essential hypertension    3. Stage 3a chronic kidney disease    4. Rheumatoid arthritis of multiple sites with negative rheumatoid factor    5. Anxiety    6. Mild recurrent major depression    7. Cigarette nicotine dependence without complication    8. Congestive heart failure, unspecified HF chronicity, unspecified heart failure type    9. Hiatal hernia with GERD and esophagitis           Plan:         1. Hospital discharge follow-up  - hospital records reviewed.  - she has completed antibiotics  - she has already had surgery outpatient f/u    2. Essential hypertension    - sacubitriL-valsartan (ENTRESTO)  mg per tablet; Take 1 tablet by mouth 2 (two) times daily.  Dispense: 60 tablet; Refill: 5    3. Stage 3a chronic kidney disease  - chronic, improved on recent outside labs. Monitor w/ labs- next visit. Oral hydration. Avoid nsaids. Est w/ nephrology- sees yearly      4. Rheumatoid arthritis of multiple sites with negative rheumatoid factor  - she is trying to est w/ new rheumatologist in [place]. Will let clinic know where she needs referral sent.  - sulfaSALAzine (AZULFIDINE) 500 MG EC tablet; Take 1 tablet (500 mg total) by mouth 2 (two) times daily.   Dispense: 60 tablet; Refill: 4    5. Anxiety    - busPIRone (BUSPAR) 15 MG tablet; Pt is to take 1/3 (5 mg) or 1/2 (7.5mgs) or one po HS PRN  Dispense: 90 tablet; Refill: 2  - buPROPion (WELLBUTRIN SR) 150 MG TBSR 12 hr tablet; Take 1 tablet (150 mg total) by mouth every 12 (twelve) hours.  Dispense: 180 tablet; Refill: 2    6. Mild recurrent major depression    - buPROPion (WELLBUTRIN SR) 150 MG TBSR 12 hr tablet; Take 1 tablet (150 mg total) by mouth every 12 (twelve) hours.  Dispense: 180 tablet; Refill: 2    7. Cigarette nicotine dependence without complication  - encouraged complete cessation  - buPROPion (WELLBUTRIN SR) 150 MG TBSR 12 hr tablet; Take 1 tablet (150 mg total) by mouth every 12 (twelve) hours.  Dispense: 180 tablet; Refill: 2  - Ambulatory referral/consult to Smoking Cessation Program; Future    8. Congestive heart failure, unspecified HF chronicity, unspecified heart failure type  - chronic. Follows w/ cardiologist.  - metoprolol succinate (TOPROL-XL) 50 MG 24 hr tablet; Take 1 tablet (50 mg total) by mouth once daily.  Dispense: 90 tablet; Refill: 1  - sacubitriL-valsartan (ENTRESTO)  mg per tablet; Take 1 tablet by mouth 2 (two) times daily.  Dispense: 60 tablet; Refill: 5    9. Hiatal hernia with GERD and esophagitis    - pantoprazole (PROTONIX) 40 MG tablet; Take 1 tablet (40 mg total) by mouth once daily.  Dispense: 90 tablet; Refill: 1      Unless there are intervening problems, Follow up in about 3 months (around 2/15/2024), or if symptoms worsen or fail to improve, for follow up.      Patient note was created using MModal Dictation.  Any errors in syntax or even information may not have been identified and edited on initial review prior to signing this note.    I spent a total of 48 minutes on the day of the visit.  This includes face to face time and non-face to face time preparing to see the patient (eg, review of tests), obtaining and/or reviewing separately obtained history,  documenting clinical information in the electronic or other health record, independently interpreting results and communicating results to the patient/family/caregiver, or care coordinator.

## 2023-11-27 ENCOUNTER — CLINICAL SUPPORT (OUTPATIENT)
Dept: SMOKING CESSATION | Facility: CLINIC | Age: 48
End: 2023-11-27
Payer: COMMERCIAL

## 2023-11-27 DIAGNOSIS — F17.200 NICOTINE DEPENDENCE: Primary | ICD-10-CM

## 2023-11-27 DIAGNOSIS — F17.210 CIGARETTE NICOTINE DEPENDENCE WITHOUT COMPLICATION: ICD-10-CM

## 2023-11-27 PROCEDURE — 99404 PR PREVENT COUNSEL,INDIV,60 MIN: ICD-10-PCS | Mod: S$GLB,,, | Performed by: GENERAL PRACTICE

## 2023-11-27 PROCEDURE — 99404 PREV MED CNSL INDIV APPRX 60: CPT | Mod: S$GLB,,, | Performed by: GENERAL PRACTICE

## 2023-11-30 DIAGNOSIS — I50.9 CONGESTIVE HEART FAILURE, UNSPECIFIED HF CHRONICITY, UNSPECIFIED HEART FAILURE TYPE: ICD-10-CM

## 2023-11-30 DIAGNOSIS — I10 ESSENTIAL HYPERTENSION: ICD-10-CM

## 2023-12-04 ENCOUNTER — TELEPHONE (OUTPATIENT)
Dept: SMOKING CESSATION | Facility: CLINIC | Age: 48
End: 2023-12-04
Payer: MEDICARE

## 2023-12-04 NOTE — TELEPHONE ENCOUNTER
Attempted to contact patient to conduct a scheduled follow up appointment.  Patient did not answer.  Counselor left a voice message with name and contact information.

## 2023-12-12 RX ORDER — SACUBITRIL AND VALSARTAN 97; 103 MG/1; MG/1
1 TABLET, FILM COATED ORAL 2 TIMES DAILY
Qty: 180 TABLET | Refills: 1 | Status: SHIPPED | OUTPATIENT
Start: 2023-12-12

## 2023-12-14 ENCOUNTER — PATIENT MESSAGE (OUTPATIENT)
Dept: FAMILY MEDICINE | Facility: CLINIC | Age: 48
End: 2023-12-14
Payer: MEDICARE

## 2023-12-14 DIAGNOSIS — M06.09 RHEUMATOID ARTHRITIS OF MULTIPLE SITES WITH NEGATIVE RHEUMATOID FACTOR: Primary | ICD-10-CM

## 2024-01-02 ENCOUNTER — PATIENT MESSAGE (OUTPATIENT)
Dept: FAMILY MEDICINE | Facility: CLINIC | Age: 49
End: 2024-01-02
Payer: MEDICARE

## 2024-01-02 DIAGNOSIS — G62.9 NEUROPATHY: ICD-10-CM

## 2024-01-02 RX ORDER — GABAPENTIN 300 MG/1
300 CAPSULE ORAL 3 TIMES DAILY
Qty: 90 CAPSULE | Refills: 0 | Status: SHIPPED | OUTPATIENT
Start: 2024-01-02

## 2024-01-25 DIAGNOSIS — M06.09 RHEUMATOID ARTHRITIS OF MULTIPLE SITES WITH NEGATIVE RHEUMATOID FACTOR: ICD-10-CM

## 2024-01-25 RX ORDER — SULFASALAZINE 500 MG/1
500 TABLET, DELAYED RELEASE ORAL 2 TIMES DAILY
Qty: 60 TABLET | Refills: 0 | Status: SHIPPED | OUTPATIENT
Start: 2024-01-25 | End: 2024-04-09 | Stop reason: SDUPTHER

## 2024-01-31 ENCOUNTER — TELEPHONE (OUTPATIENT)
Dept: SURGERY | Facility: CLINIC | Age: 49
End: 2024-01-31
Payer: MEDICARE

## 2024-01-31 NOTE — TELEPHONE ENCOUNTER
Pt was called to schedule appt for abdominal wall hernia. Pt did not answer and voice mail was left

## 2024-02-06 ENCOUNTER — PATIENT MESSAGE (OUTPATIENT)
Dept: ADMINISTRATIVE | Facility: OTHER | Age: 49
End: 2024-02-06
Payer: MEDICARE

## 2024-02-06 ENCOUNTER — TELEPHONE (OUTPATIENT)
Dept: SURGERY | Facility: CLINIC | Age: 49
End: 2024-02-06
Payer: MEDICARE

## 2024-02-06 DIAGNOSIS — K40.20 NON-RECURRENT BILATERAL INGUINAL HERNIA WITHOUT OBSTRUCTION OR GANGRENE: Primary | ICD-10-CM

## 2024-02-06 NOTE — TELEPHONE ENCOUNTER
----- Message from Robin Krause sent at 2/6/2024 10:56 AM CST -----  Contact: Lilly Obrien is calling in regards to getting a call back to discuss date, time and location of a procedure.  Please call back at .[phone]     Thanks       Pt called back to schedule appt for referral. Appt is scheduled 2/12/24   @900

## 2024-02-12 ENCOUNTER — OFFICE VISIT (OUTPATIENT)
Dept: SURGERY | Facility: CLINIC | Age: 49
End: 2024-02-12
Payer: MEDICARE

## 2024-02-12 VITALS — WEIGHT: 293 LBS | HEIGHT: 67 IN | BODY MASS INDEX: 45.99 KG/M2

## 2024-02-12 DIAGNOSIS — K43.0 INCISIONAL HERNIA WITH OBSTRUCTION: Primary | ICD-10-CM

## 2024-02-12 DIAGNOSIS — K43.0 INCARCERATED INCISIONAL HERNIA: Primary | ICD-10-CM

## 2024-02-12 DIAGNOSIS — K40.20 NON-RECURRENT BILATERAL INGUINAL HERNIA WITHOUT OBSTRUCTION OR GANGRENE: ICD-10-CM

## 2024-02-12 LAB
ABS NRBC COUNT: 0 THOU/UL (ref 0–0.01)
ABSOLUTE BASOPHIL: 0 10*3/UL (ref 0–0.3)
ABSOLUTE EOSINOPHIL: 0.2 10*3/UL (ref 0–0.6)
ABSOLUTE IMMATURE GRAN: 0.01 THOU/UL (ref 0–0.03)
ABSOLUTE LYMPHOCYTE: 2 10*3/UL (ref 1.2–4)
ABSOLUTE MONOCYTE: 0.4 10*3/UL (ref 0.1–0.8)
ANION GAP SERPL CALC-SCNC: 2 MMOL/L (ref 3–11)
APTT PPP: 31.4 SEC (ref 25.8–38.6)
BASOPHILS NFR BLD: 0.7 % (ref 0–3)
BUN SERPL-MCNC: 21 MG/DL (ref 7–18)
BUN/CREAT SERPL: 20.19 RATIO
CALCIUM SERPL-MCNC: 8.7 MG/DL (ref 8.5–10.1)
CHLORIDE SERPL-SCNC: 110 MMOL/L (ref 98–107)
CO2 SERPL-SCNC: 29 MMOL/L (ref 21–32)
CREAT SERPL-MCNC: 1.04 MG/DL (ref 0.55–1.02)
EOSINOPHIL NFR BLD: 3 % (ref 0–6)
ERYTHROCYTE [DISTWIDTH] IN BLOOD BY AUTOMATED COUNT: 13.4 % (ref 0–15.5)
GFR ESTIMATION: > 60
GLUCOSE SERPL-MCNC: 77 MG/DL (ref 74–106)
HCT VFR BLD AUTO: 36.5 % (ref 37–47)
HGB BLD-MCNC: 11.7 G/DL (ref 12–16)
IMMATURE GRANULOCYTES: 0.2 % (ref 0–0.43)
INR PPP: 1 INR (ref 0.9–1.1)
LYMPHOCYTES NFR BLD: 34.6 % (ref 20–45)
MCH RBC QN AUTO: 30.9 PG (ref 27–32)
MCHC RBC AUTO-ENTMCNC: 32.1 % (ref 32–36)
MCV RBC AUTO: 96.3 FL (ref 80–99)
MONOCYTES NFR BLD: 7 % (ref 2–10)
NEUTROPHILS # BLD AUTO: 3.1 10*3/UL (ref 1.4–7)
NEUTROPHILS NFR BLD: 54.5 % (ref 50–80)
NUCLEATED RED BLOOD CELLS: 0 % (ref 0–0.2)
PLATELETS: 280 10*3/UL (ref 130–400)
PMV BLD AUTO: 10 FL (ref 9.2–12.2)
POTASSIUM SERPL-SCNC: 4.6 MMOL/L (ref 3.5–5.1)
PROTHROMBIN TIME: 11.4 SEC (ref 10.2–12.9)
RBC # BLD AUTO: 3.79 10*6/UL (ref 4.2–5.4)
SODIUM BLD-SCNC: 141 MMOL/L (ref 131–143)
WBC # BLD: 5.7 10*3/UL (ref 4.5–10)

## 2024-02-12 PROCEDURE — 1160F RVW MEDS BY RX/DR IN RCRD: CPT | Mod: CPTII,S$GLB,, | Performed by: SURGERY

## 2024-02-12 PROCEDURE — 1159F MED LIST DOCD IN RCRD: CPT | Mod: CPTII,S$GLB,, | Performed by: SURGERY

## 2024-02-12 PROCEDURE — 4010F ACE/ARB THERAPY RXD/TAKEN: CPT | Mod: CPTII,S$GLB,, | Performed by: SURGERY

## 2024-02-12 PROCEDURE — 99214 OFFICE O/P EST MOD 30 MIN: CPT | Mod: S$GLB,,, | Performed by: SURGERY

## 2024-02-12 PROCEDURE — 3008F BODY MASS INDEX DOCD: CPT | Mod: CPTII,S$GLB,, | Performed by: SURGERY

## 2024-02-12 NOTE — PROGRESS NOTES
Subjective:       Patient ID: Lilly Loera is a 48 y.o. female.    Chief Complaint: Hernia (Pt reports pain level of 5.)      48-year-old female with history of a laparoscopic gastric bypass performed approximately 10 years ago, patient had about 200 lb of weight loss.  Patient has been having bulging in her right abdominal wall, had outpatient CT scan which demonstrated an incarcerated right abdominal wall hernia containing fat in bowel, as well as a supraumbilical hernia.  Patient states that her symptoms of pain and bulging have been getting significantly worse over the last month.      Review of Systems   Constitutional:  Negative for chills and fever.   HENT:  Negative for nasal congestion and rhinorrhea.    Eyes:  Negative for discharge and visual disturbance.   Respiratory:  Negative for shortness of breath and wheezing.    Cardiovascular:  Negative for chest pain and palpitations.   Gastrointestinal: Negative.  Negative for abdominal distention, abdominal pain, anal bleeding, blood in stool, constipation, diarrhea, nausea, rectal pain and vomiting.   Endocrine: Negative for cold intolerance and heat intolerance.   Genitourinary:  Negative for difficulty urinating and frequency.   Musculoskeletal:  Negative for back pain and myalgias.   Integumentary:  Negative for pallor and rash.   Neurological:  Negative for dizziness and headaches.   Hematological:  Negative for adenopathy. Does not bruise/bleed easily.   Psychiatric/Behavioral:  Negative for behavioral problems. The patient is not nervous/anxious.          Objective:      Physical Exam  Constitutional:       Appearance: Normal appearance. She is well-developed. She is obese.   HENT:      Head: Normocephalic and atraumatic.   Eyes:      General: Lids are normal.      Conjunctiva/sclera: Conjunctivae normal.   Neck:      Trachea: Trachea normal.   Cardiovascular:      Rate and Rhythm: Normal rate and regular rhythm.      Heart sounds: Normal heart  sounds.   Pulmonary:      Effort: Pulmonary effort is normal.      Breath sounds: Normal breath sounds.   Abdominal:      General: Bowel sounds are normal. There is no distension.      Palpations: Abdomen is soft.      Tenderness: There is no abdominal tenderness.      Hernia: No hernia is present.          Comments: 5 cm x 5 cm incarcerated right abdominal wall incisional hernia   Musculoskeletal:      Cervical back: Normal range of motion.   Skin:     General: Skin is warm and dry.   Neurological:      Mental Status: She is alert and oriented to person, place, and time.   Psychiatric:         Speech: Speech normal.         Behavior: Behavior normal. Behavior is cooperative.         Assessment:       Problem List Items Addressed This Visit    None  Visit Diagnoses       Incarcerated incisional hernia    -  Primary    Non-recurrent bilateral inguinal hernia without obstruction or gangrene                Plan:       48-year-old female with history of gastric bypass, a incarcerated 5 cm right abdominal wall incisional hernia and a supraumbilical hernia that was seen on CT scan.  Patient's symptoms have been getting dramatically worse over the last month.  Extensive conversation was had with the patient regarding her increased chance of recurrence considering her morbid obesity but considering her symptoms have been progressively getting worse we will proceed with surgery.  Patient was scheduled for robotic ventral hernia repair.

## 2024-02-13 ENCOUNTER — TELEPHONE (OUTPATIENT)
Dept: SURGERY | Facility: CLINIC | Age: 49
End: 2024-02-13
Payer: MEDICARE

## 2024-02-13 NOTE — TELEPHONE ENCOUNTER
PCP contacted to have medical clearance for procedure that is scheduled for 2/15/24 was not able to talk to anyone because they hung up. Cardiologist also contacted for cardiac clearance but answering service stated they would receive it on 2/14/24.

## 2024-02-14 ENCOUNTER — TELEPHONE (OUTPATIENT)
Dept: FAMILY MEDICINE | Facility: CLINIC | Age: 49
End: 2024-02-14
Payer: MEDICARE

## 2024-02-14 ENCOUNTER — TELEPHONE (OUTPATIENT)
Dept: SURGERY | Facility: CLINIC | Age: 49
End: 2024-02-14
Payer: MEDICARE

## 2024-02-14 ENCOUNTER — CLINICAL SUPPORT (OUTPATIENT)
Dept: SMOKING CESSATION | Facility: CLINIC | Age: 49
End: 2024-02-14
Payer: COMMERCIAL

## 2024-02-14 DIAGNOSIS — F17.200 NICOTINE DEPENDENCE: Primary | ICD-10-CM

## 2024-02-14 PROCEDURE — 99999 PR PBB SHADOW E&M-EST. PATIENT-LVL I: CPT | Mod: PBBFAC,,,

## 2024-02-14 PROCEDURE — 99407 BEHAV CHNG SMOKING > 10 MIN: CPT | Mod: S$GLB,,,

## 2024-02-14 NOTE — TELEPHONE ENCOUNTER
Called pt Pcp for medical clearance for sx on 2/15/24 stated they would fax it ASAP. Also called cardiologist for cardiac clearance and stated they would fx it over asap.

## 2024-02-14 NOTE — PROGRESS NOTES
Called pt to f/u on her 3 month smoking cessation quit status. Pt stated she is still smoking, but has cut back and still focused to get quit since she will be having surgery. Informed her she has benefits available and is able to rejoin. Pt not ready to make appointment. She will call back when ready. Informed her of benefit period, phone follow ups, and contact information. Will complete smart form and will continue to follow up on quit #1 episode.

## 2024-02-14 NOTE — TELEPHONE ENCOUNTER
Pt called to be informed medical clearance was received but not the cardiac clearance. Pt stated she is calling her cardiologist to get the clearance sent ASAP

## 2024-02-14 NOTE — TELEPHONE ENCOUNTER
I just faxed over the pre op form on this pt. To you.  Dr. La Jones says she needs to be cleared by her cardiologist.

## 2024-02-15 ENCOUNTER — OUTSIDE PLACE OF SERVICE (OUTPATIENT)
Dept: SURGERY | Facility: CLINIC | Age: 49
End: 2024-02-15

## 2024-02-15 PROCEDURE — 49594 RPR AA HRN 1ST 3-10 NCR/STRN: CPT | Mod: ,,, | Performed by: SURGERY

## 2024-02-16 ENCOUNTER — TELEPHONE (OUTPATIENT)
Dept: SURGERY | Facility: CLINIC | Age: 49
End: 2024-02-16
Payer: MEDICARE

## 2024-02-16 NOTE — TELEPHONE ENCOUNTER
----- Message from Brenda Do sent at 2/16/2024 12:03 PM CST -----  Contact: mai  Patient stated that hydrocodone is making her sick, stomach pains, indigestion, and gagging. Patient says she need something else for pain and stomach issues. Please call her back at [phone].        [facility] [address] [facility] [place] [place] [place] [facility] [address]  [address] [place] [address]  Phone: [phone] Fax: [phone]        Thanks  DD

## 2024-02-21 ENCOUNTER — TELEPHONE (OUTPATIENT)
Dept: SURGERY | Facility: CLINIC | Age: 49
End: 2024-02-21
Payer: MEDICARE

## 2024-02-21 NOTE — TELEPHONE ENCOUNTER
----- Message from Ayaka Ellis sent at 2/21/2024 10:01 AM CST -----  Contact: self  Type:  Patient Returning Call    Who Called:Lilly Loera  Who Left Message for Patient:unsure  Does the patient know what this is regarding?:post op questions  Would the patient rather a call back or a response via [facility]?   Best Call Back Number:[phone]  Additional Information: pulling around surgery site, medication is completed, pain is still present    Pt was contacted back because pt states she's in pain and has trouble getting from sitting to standing positions. Pt stated she alternats tylenol and her prescribed tramadol for pain. Pt states she also wears her abdominal binder and that helps to an extent. Stated I will get with Dr. Londono and contact the pt as soon as possible pt stated to contact her via patient portal

## 2024-02-27 ENCOUNTER — OUTSIDE PLACE OF SERVICE (OUTPATIENT)
Dept: INTERVENTIONAL RADIOLOGY/VASCULAR | Facility: CLINIC | Age: 49
End: 2024-02-27
Payer: MEDICARE

## 2024-02-27 PROCEDURE — 76942 ECHO GUIDE FOR BIOPSY: CPT | Mod: 26,59,, | Performed by: RADIOLOGY

## 2024-02-27 PROCEDURE — 49406 IMAGE CATH FLUID PERI/RETRO: CPT | Mod: ,,, | Performed by: RADIOLOGY

## 2024-02-27 PROCEDURE — 10160 PNXR ASPIR ABSC HMTMA BULLA: CPT | Mod: 51,,, | Performed by: RADIOLOGY

## 2024-02-28 ENCOUNTER — TELEPHONE (OUTPATIENT)
Dept: FAMILY MEDICINE | Facility: CLINIC | Age: 49
End: 2024-02-28
Payer: MEDICARE

## 2024-02-28 ENCOUNTER — TELEPHONE (OUTPATIENT)
Dept: SURGERY | Facility: CLINIC | Age: 49
End: 2024-02-28
Payer: MEDICARE

## 2024-02-28 NOTE — TELEPHONE ENCOUNTER
----- Message from Alis Pacheco sent at 2/28/2024  8:09 AM CST -----  Contact: Catrina Lynch  Type: Staff Message    Caller: Catrina Lynch  Call Back Number: [phone]  Nature of the Call: yadira needs a 1 week hospital follow up   Additional Information: na

## 2024-02-28 NOTE — TELEPHONE ENCOUNTER
----- Message from Alis Pacheco sent at 2/28/2024  8:05 AM CST -----  Contact: Catrina Asif  Type: Staff Message    Caller: Catrina mark Asif  Call Back Number: [phone]  Nature of the Call: #Post operative Hematoma   Additional Information: na      Pt called office to inform us that she had went to the hospital 2/23/24 due to abdominal pain. Pt stated hospital did a CT scan and found pt had a post op hematoma and will be getting discharged today

## 2024-03-04 ENCOUNTER — TELEPHONE (OUTPATIENT)
Dept: SURGERY | Facility: CLINIC | Age: 49
End: 2024-03-04
Payer: MEDICARE

## 2024-03-04 ENCOUNTER — PATIENT MESSAGE (OUTPATIENT)
Dept: SURGERY | Facility: CLINIC | Age: 49
End: 2024-03-04
Payer: MEDICARE

## 2024-03-05 ENCOUNTER — PATIENT MESSAGE (OUTPATIENT)
Dept: SURGERY | Facility: CLINIC | Age: 49
End: 2024-03-05
Payer: MEDICARE

## 2024-03-05 ENCOUNTER — CLINICAL SUPPORT (OUTPATIENT)
Dept: SURGERY | Facility: CLINIC | Age: 49
End: 2024-03-05
Payer: MEDICARE

## 2024-03-05 DIAGNOSIS — K43.2 INCISIONAL HERNIA, WITHOUT OBSTRUCTION OR GANGRENE: Primary | ICD-10-CM

## 2024-03-05 PROCEDURE — 99499 UNLISTED E&M SERVICE: CPT | Mod: S$GLB,,, | Performed by: SURGERY

## 2024-03-05 NOTE — PROGRESS NOTES
Pt came in office to remove pigtail drain that was placed by radiology a week ago. 10 ml of straw colored drainage in bulb.  Suture and drain removed successfully without complaints

## 2024-03-14 ENCOUNTER — TELEPHONE (OUTPATIENT)
Dept: FAMILY MEDICINE | Facility: CLINIC | Age: 49
End: 2024-03-14

## 2024-03-14 ENCOUNTER — PATIENT MESSAGE (OUTPATIENT)
Dept: FAMILY MEDICINE | Facility: CLINIC | Age: 49
End: 2024-03-14

## 2024-03-14 DIAGNOSIS — G62.9 NEUROPATHY: ICD-10-CM

## 2024-03-14 RX ORDER — GABAPENTIN 300 MG/1
300 CAPSULE ORAL 3 TIMES DAILY
Qty: 90 CAPSULE | Refills: 2 | Status: SHIPPED | OUTPATIENT
Start: 2024-03-14

## 2024-03-14 NOTE — TELEPHONE ENCOUNTER
Needs RF of Gabapentin sent to mail order.      Does not see rheumatologist until May.      Had to cancel todays appt due to mammo appt taking too long.

## 2024-03-20 ENCOUNTER — PATIENT MESSAGE (OUTPATIENT)
Dept: SURGERY | Facility: CLINIC | Age: 49
End: 2024-03-20
Payer: MEDICARE

## 2024-03-21 ENCOUNTER — TELEPHONE (OUTPATIENT)
Dept: SURGERY | Facility: CLINIC | Age: 49
End: 2024-03-21
Payer: MEDICARE

## 2024-03-25 ENCOUNTER — OFFICE VISIT (OUTPATIENT)
Dept: SURGERY | Facility: CLINIC | Age: 49
End: 2024-03-25
Payer: MEDICARE

## 2024-03-25 VITALS — HEIGHT: 67 IN | BODY MASS INDEX: 45.99 KG/M2 | WEIGHT: 293 LBS

## 2024-03-25 DIAGNOSIS — K43.2 INCISIONAL HERNIA, WITHOUT OBSTRUCTION OR GANGRENE: Primary | ICD-10-CM

## 2024-03-25 PROCEDURE — 99213 OFFICE O/P EST LOW 20 MIN: CPT | Mod: S$GLB,,, | Performed by: SURGERY

## 2024-03-25 PROCEDURE — 1159F MED LIST DOCD IN RCRD: CPT | Mod: CPTII,S$GLB,, | Performed by: SURGERY

## 2024-03-25 PROCEDURE — 1160F RVW MEDS BY RX/DR IN RCRD: CPT | Mod: CPTII,S$GLB,, | Performed by: SURGERY

## 2024-03-25 PROCEDURE — 4010F ACE/ARB THERAPY RXD/TAKEN: CPT | Mod: CPTII,S$GLB,, | Performed by: SURGERY

## 2024-03-25 PROCEDURE — 3008F BODY MASS INDEX DOCD: CPT | Mod: CPTII,S$GLB,, | Performed by: SURGERY

## 2024-03-25 NOTE — PROGRESS NOTES
Subjective:       Patient ID: Lilly Loera is a 48 y.o. female.    Chief Complaint: Post-op Evaluation (Pt here today to discuss pain after sx. Pt went to ER in [place]. Found abdominal hematoma. Reports pain improvement after applying heat.)      Patient is 2 weeks status post robotic incisional hernia repair.  Patient had a sharp pain at the repair site prompted her to go to the emergency room for evaluation.  Patient CT scan which showed a small hematoma at the repair site but otherwise no acute findings.  Patient states that today she is feeling fine is not having any pain.            Review of Systems   Constitutional:  Negative for chills and fever.   HENT:  Negative for nasal congestion and rhinorrhea.    Eyes:  Negative for discharge and visual disturbance.   Respiratory:  Negative for shortness of breath and wheezing.    Cardiovascular:  Negative for chest pain and palpitations.   Gastrointestinal: Negative.  Negative for abdominal distention, abdominal pain, anal bleeding, blood in stool, constipation, diarrhea, nausea, rectal pain and vomiting.   Endocrine: Negative for cold intolerance and heat intolerance.   Genitourinary:  Negative for difficulty urinating and frequency.   Musculoskeletal:  Negative for back pain and myalgias.   Integumentary:  Negative for pallor and rash.   Neurological:  Negative for dizziness and headaches.   Hematological:  Negative for adenopathy. Does not bruise/bleed easily.   Psychiatric/Behavioral:  Negative for behavioral problems. The patient is not nervous/anxious.          Objective:      Physical Exam  Constitutional:       Appearance: Normal appearance. She is well-developed.   HENT:      Head: Normocephalic and atraumatic.   Eyes:      General: Lids are normal.      Conjunctiva/sclera: Conjunctivae normal.   Neck:      Trachea: Trachea normal.   Cardiovascular:      Rate and Rhythm: Normal rate and regular rhythm.      Heart sounds: Normal heart sounds.   Pulmonary:       Effort: Pulmonary effort is normal.      Breath sounds: Normal breath sounds.   Abdominal:      General: Bowel sounds are normal. There is no distension.      Palpations: Abdomen is soft.      Tenderness: There is no abdominal tenderness.      Hernia: No hernia is present.          Comments: Site of repair intact no signs of recurrence, underlying hematoma not palpated noted on exam   Musculoskeletal:      Cervical back: Normal range of motion.   Skin:     General: Skin is warm and dry.   Neurological:      Mental Status: She is alert and oriented to person, place, and time.   Psychiatric:         Speech: Speech normal.         Behavior: Behavior normal. Behavior is cooperative.         Assessment:       Problem List Items Addressed This Visit    None  Visit Diagnoses       Incisional hernia, without obstruction or gangrene    -  Primary            Plan:       Patient doing well, developed a small hematoma at the repair site so not appear to be insignificant at this time.  Patient instructed to refrain from any heavy lifting for another 4 weeks and follow up on a p.r.n.

## 2024-04-09 DIAGNOSIS — M06.09 RHEUMATOID ARTHRITIS OF MULTIPLE SITES WITH NEGATIVE RHEUMATOID FACTOR: ICD-10-CM

## 2024-04-09 RX ORDER — SULFASALAZINE 500 MG/1
500 TABLET, DELAYED RELEASE ORAL 2 TIMES DAILY
Qty: 60 TABLET | Refills: 0 | Status: SHIPPED | OUTPATIENT
Start: 2024-04-09 | End: 2024-05-21 | Stop reason: SDUPTHER

## 2024-04-19 ENCOUNTER — PATIENT MESSAGE (OUTPATIENT)
Dept: FAMILY MEDICINE | Facility: CLINIC | Age: 49
End: 2024-04-19
Payer: MEDICARE

## 2024-04-19 RX ORDER — FUROSEMIDE 20 MG/1
20 TABLET ORAL
Qty: 90 TABLET | Refills: 1 | Status: SHIPPED | OUTPATIENT
Start: 2024-04-19

## 2024-04-23 LAB — BCS RECOMMENDATION EXT: NORMAL

## 2024-05-10 DIAGNOSIS — K44.9 HIATAL HERNIA WITH GERD AND ESOPHAGITIS: ICD-10-CM

## 2024-05-10 DIAGNOSIS — K21.00 HIATAL HERNIA WITH GERD AND ESOPHAGITIS: ICD-10-CM

## 2024-05-10 RX ORDER — PANTOPRAZOLE SODIUM 40 MG/1
40 TABLET, DELAYED RELEASE ORAL
Qty: 90 TABLET | Refills: 0 | Status: SHIPPED | OUTPATIENT
Start: 2024-05-10

## 2024-05-21 ENCOUNTER — PATIENT MESSAGE (OUTPATIENT)
Dept: ADMINISTRATIVE | Facility: HOSPITAL | Age: 49
End: 2024-05-21
Payer: MEDICARE

## 2024-05-21 DIAGNOSIS — M06.09 RHEUMATOID ARTHRITIS OF MULTIPLE SITES WITH NEGATIVE RHEUMATOID FACTOR: ICD-10-CM

## 2024-05-22 ENCOUNTER — PATIENT OUTREACH (OUTPATIENT)
Dept: ADMINISTRATIVE | Facility: HOSPITAL | Age: 49
End: 2024-05-22
Payer: MEDICARE

## 2024-05-22 RX ORDER — SULFASALAZINE 500 MG/1
500 TABLET, DELAYED RELEASE ORAL 2 TIMES DAILY
Qty: 60 TABLET | Refills: 0 | Status: SHIPPED | OUTPATIENT
Start: 2024-05-22

## 2024-05-22 NOTE — PROGRESS NOTES
Replying to Campaign Questionnaire for Overdue HM: MAMMOGRAM     Manually hyper-linked MAMMOGRAM

## 2024-06-05 DIAGNOSIS — E66.01 OBESITY, MORBID: ICD-10-CM

## 2024-06-05 RX ORDER — SEMAGLUTIDE 0.68 MG/ML
INJECTION, SOLUTION SUBCUTANEOUS
Qty: 9 EACH | Refills: 3 | Status: SHIPPED | OUTPATIENT
Start: 2024-06-05

## 2024-06-16 DIAGNOSIS — F41.9 ANXIETY: ICD-10-CM

## 2024-06-16 DIAGNOSIS — F33.0 MILD RECURRENT MAJOR DEPRESSION: ICD-10-CM

## 2024-06-16 DIAGNOSIS — F17.210 CIGARETTE NICOTINE DEPENDENCE WITHOUT COMPLICATION: ICD-10-CM

## 2024-06-17 RX ORDER — BUPROPION HYDROCHLORIDE 150 MG/1
150 TABLET, EXTENDED RELEASE ORAL EVERY 12 HOURS
Qty: 180 TABLET | Refills: 0 | Status: SHIPPED | OUTPATIENT
Start: 2024-06-17

## 2024-06-21 ENCOUNTER — TELEPHONE (OUTPATIENT)
Dept: PRIMARY CARE CLINIC | Facility: CLINIC | Age: 49
End: 2024-06-21
Payer: MEDICARE

## 2024-06-21 VITALS — SYSTOLIC BLOOD PRESSURE: 132 MMHG | DIASTOLIC BLOOD PRESSURE: 89 MMHG

## 2024-07-08 DIAGNOSIS — I10 ESSENTIAL HYPERTENSION: ICD-10-CM

## 2024-07-09 RX ORDER — AMLODIPINE BESYLATE 5 MG/1
5 TABLET ORAL DAILY
Qty: 90 TABLET | Refills: 0 | Status: SHIPPED | OUTPATIENT
Start: 2024-07-09

## 2024-07-24 DIAGNOSIS — K44.9 HIATAL HERNIA WITH GERD AND ESOPHAGITIS: ICD-10-CM

## 2024-07-24 DIAGNOSIS — K21.00 HIATAL HERNIA WITH GERD AND ESOPHAGITIS: ICD-10-CM

## 2024-07-24 DIAGNOSIS — F41.9 ANXIETY: ICD-10-CM

## 2024-07-24 RX ORDER — BUSPIRONE HYDROCHLORIDE 15 MG/1
TABLET ORAL
Qty: 90 TABLET | Refills: 2 | Status: SHIPPED | OUTPATIENT
Start: 2024-07-24

## 2024-07-24 RX ORDER — PANTOPRAZOLE SODIUM 40 MG/1
40 TABLET, DELAYED RELEASE ORAL DAILY
Qty: 90 TABLET | Refills: 0 | Status: SHIPPED | OUTPATIENT
Start: 2024-07-24

## 2024-08-21 ENCOUNTER — OFFICE VISIT (OUTPATIENT)
Dept: FAMILY MEDICINE | Facility: CLINIC | Age: 49
End: 2024-08-21
Payer: MEDICARE

## 2024-08-21 ENCOUNTER — TELEPHONE (OUTPATIENT)
Dept: FAMILY MEDICINE | Facility: CLINIC | Age: 49
End: 2024-08-21

## 2024-08-21 VITALS
SYSTOLIC BLOOD PRESSURE: 128 MMHG | OXYGEN SATURATION: 98 % | DIASTOLIC BLOOD PRESSURE: 80 MMHG | WEIGHT: 287.63 LBS | BODY MASS INDEX: 45.15 KG/M2 | TEMPERATURE: 98 F | HEART RATE: 81 BPM | HEIGHT: 67 IN | RESPIRATION RATE: 15 BRPM

## 2024-08-21 DIAGNOSIS — Z79.899 DRUG-INDUCED IMMUNODEFICIENCY: ICD-10-CM

## 2024-08-21 DIAGNOSIS — E66.01 OBESITY, MORBID: ICD-10-CM

## 2024-08-21 DIAGNOSIS — I10 ESSENTIAL HYPERTENSION: ICD-10-CM

## 2024-08-21 DIAGNOSIS — D84.821 DRUG-INDUCED IMMUNODEFICIENCY: ICD-10-CM

## 2024-08-21 DIAGNOSIS — G62.9 NEUROPATHY: ICD-10-CM

## 2024-08-21 DIAGNOSIS — I48.91 ATRIAL FIBRILLATION, UNSPECIFIED TYPE: ICD-10-CM

## 2024-08-21 DIAGNOSIS — Z13.6 SCREENING FOR CARDIOVASCULAR CONDITION: ICD-10-CM

## 2024-08-21 DIAGNOSIS — F33.0 MILD RECURRENT MAJOR DEPRESSION: ICD-10-CM

## 2024-08-21 DIAGNOSIS — Z79.899 OTHER LONG TERM (CURRENT) DRUG THERAPY: ICD-10-CM

## 2024-08-21 DIAGNOSIS — L73.2 HIDRADENITIS SUPPURATIVA: ICD-10-CM

## 2024-08-21 DIAGNOSIS — M06.09 RHEUMATOID ARTHRITIS OF MULTIPLE SITES WITH NEGATIVE RHEUMATOID FACTOR: ICD-10-CM

## 2024-08-21 DIAGNOSIS — Z00.00 ANNUAL PHYSICAL EXAM: Primary | ICD-10-CM

## 2024-08-21 DIAGNOSIS — D64.9 NORMOCYTIC ANEMIA: ICD-10-CM

## 2024-08-21 DIAGNOSIS — R53.83 FATIGUE, UNSPECIFIED TYPE: ICD-10-CM

## 2024-08-21 DIAGNOSIS — N18.31 STAGE 3A CHRONIC KIDNEY DISEASE: ICD-10-CM

## 2024-08-21 DIAGNOSIS — Z98.84 S/P GASTRIC BYPASS: ICD-10-CM

## 2024-08-21 DIAGNOSIS — I50.9 CONGESTIVE HEART FAILURE, UNSPECIFIED HF CHRONICITY, UNSPECIFIED HEART FAILURE TYPE: ICD-10-CM

## 2024-08-21 LAB
% SATURATION: 21 % (ref 20–50)
25(OH)D3 SERPL-MCNC: 50 NG/ML
ABS NRBC COUNT: 0 THOU/UL (ref 0–0.01)
ABSOLUTE BASOPHIL: 0 10*3/UL (ref 0–0.3)
ABSOLUTE EOSINOPHIL: 0.2 10*3/UL (ref 0–0.6)
ABSOLUTE IMMATURE GRAN: 0.01 THOU/UL (ref 0–0.03)
ABSOLUTE LYMPHOCYTE: 1.9 10*3/UL (ref 1.2–4)
ABSOLUTE MONOCYTE: 0.4 10*3/UL (ref 0.1–0.8)
ALBUMIN SERPL BCP-MCNC: 3.4 G/DL (ref 3.4–5)
ALP SERPL-CCNC: 93 U/L (ref 45–117)
ALT SERPL W P-5'-P-CCNC: 22 U/L (ref 13–56)
ANION GAP SERPL CALC-SCNC: 1 MMOL/L (ref 3–11)
AST SERPL-CCNC: 25 U/L (ref 15–37)
BASOPHILS NFR BLD: 0.6 % (ref 0–3)
BILIRUB SERPL-MCNC: 0.4 MG/DL (ref 0.2–1)
BUN SERPL-MCNC: 17 MG/DL (ref 7–18)
BUN/CREAT SERPL: 13.38 RATIO
CALCIUM SERPL-MCNC: 8.7 MG/DL (ref 8.5–10.1)
CHLORIDE SERPL-SCNC: 107 MMOL/L (ref 98–107)
CHOLEST SERPL-MSCNC: 124 MG/DL
CO2 SERPL-SCNC: 28 MMOL/L (ref 21–32)
CREAT SERPL-MCNC: 1.27 MG/DL (ref 0.55–1.02)
EOSINOPHIL NFR BLD: 4 % (ref 0–6)
ERYTHROCYTE [DISTWIDTH] IN BLOOD BY AUTOMATED COUNT: 13.2 % (ref 0–15.5)
ESTIMATED AVG GLUCOSE: 83 MG/DL
FERRITIN SERPL-MCNC: 54.1 NG/ML (ref 8–252)
GFR ESTIMATION: 55
GLUCOSE SERPL-MCNC: 77 MG/DL (ref 74–106)
HBA1C MFR BLD: 4.5 % (ref 4.2–6.3)
HCT VFR BLD AUTO: 35.8 % (ref 37–47)
HDLC SERPL-MCNC: 50 MG/DL
HGB BLD-MCNC: 11 G/DL (ref 12–16)
IMMATURE GRANULOCYTES: 0.2 % (ref 0–0.43)
IRON: 57 UG/DL (ref 50–170)
LDLC SERPL CALC-MCNC: 60.2 MG/DL
LYMPHOCYTES NFR BLD: 37.6 % (ref 20–45)
MCH RBC QN AUTO: 29.6 PG (ref 27–32)
MCHC RBC AUTO-ENTMCNC: 30.7 % (ref 32–36)
MCV RBC AUTO: 96.2 FL (ref 80–99)
MONOCYTES NFR BLD: 8.1 % (ref 2–10)
NEUTROPHILS # BLD AUTO: 2.5 10*3/UL (ref 1.4–7)
NEUTROPHILS NFR BLD: 49.5 % (ref 50–80)
NUCLEATED RED BLOOD CELLS: 0 % (ref 0–0.2)
PLATELETS: 393 10*3/UL (ref 130–400)
PMV BLD AUTO: 9.9 FL (ref 9.2–12.2)
POTASSIUM SERPL-SCNC: 4.1 MMOL/L (ref 3.5–5.1)
PROT SERPL-MCNC: 7.8 G/DL (ref 6.4–8.2)
RBC # BLD AUTO: 3.72 10*6/UL (ref 4.2–5.4)
SODIUM BLD-SCNC: 136 MMOL/L (ref 131–143)
T4 FREE SP9 P CHAL SERPL-SCNC: 1.2 NG/DL (ref 0.76–1.46)
TOTAL IRON BINDING CAPACITY: 274 UG/DL (ref 250–450)
TRIGL SERPL-MCNC: 69 MG/DL (ref 0–149)
TSH SERPL DL<=0.005 MIU/L-ACNC: 1.3 UIU/ML (ref 0.36–3.74)
VITAMIN B12: 858 PG/ML (ref 193–986)
VLDL CHOLESTEROL: 14 MG/DL
WBC # BLD: 5 10*3/UL (ref 4.5–10)

## 2024-08-21 PROCEDURE — 3074F SYST BP LT 130 MM HG: CPT | Mod: CPTII,S$GLB,, | Performed by: INTERNAL MEDICINE

## 2024-08-21 PROCEDURE — 1160F RVW MEDS BY RX/DR IN RCRD: CPT | Mod: CPTII,S$GLB,, | Performed by: INTERNAL MEDICINE

## 2024-08-21 PROCEDURE — 3079F DIAST BP 80-89 MM HG: CPT | Mod: CPTII,S$GLB,, | Performed by: INTERNAL MEDICINE

## 2024-08-21 PROCEDURE — 4010F ACE/ARB THERAPY RXD/TAKEN: CPT | Mod: CPTII,S$GLB,, | Performed by: INTERNAL MEDICINE

## 2024-08-21 PROCEDURE — 3008F BODY MASS INDEX DOCD: CPT | Mod: CPTII,S$GLB,, | Performed by: INTERNAL MEDICINE

## 2024-08-21 PROCEDURE — 99215 OFFICE O/P EST HI 40 MIN: CPT | Mod: S$GLB,,, | Performed by: INTERNAL MEDICINE

## 2024-08-21 PROCEDURE — 1159F MED LIST DOCD IN RCRD: CPT | Mod: CPTII,S$GLB,, | Performed by: INTERNAL MEDICINE

## 2024-08-21 PROCEDURE — G2211 COMPLEX E/M VISIT ADD ON: HCPCS | Mod: S$GLB,,, | Performed by: INTERNAL MEDICINE

## 2024-08-21 RX ORDER — DULOXETIN HYDROCHLORIDE 30 MG/1
30 CAPSULE, DELAYED RELEASE ORAL 2 TIMES DAILY
COMMUNITY
Start: 2024-08-08

## 2024-08-21 RX ORDER — SULFASALAZINE 500 MG/1
500 TABLET, DELAYED RELEASE ORAL 2 TIMES DAILY
Qty: 180 TABLET | Refills: 0 | Status: SHIPPED | OUTPATIENT
Start: 2024-08-21

## 2024-08-21 RX ORDER — ONDANSETRON 4 MG/1
4 TABLET, ORALLY DISINTEGRATING ORAL EVERY 12 HOURS PRN
COMMUNITY
Start: 2024-06-08

## 2024-08-21 RX ORDER — GABAPENTIN 300 MG/1
300 CAPSULE ORAL 2 TIMES DAILY
Qty: 180 CAPSULE | Refills: 1 | Status: SHIPPED | OUTPATIENT
Start: 2024-08-21

## 2024-08-21 RX ORDER — CYANOCOBALAMIN, ISOPROPYL ALCOHOL 1000MCG/ML
1000 KIT INJECTION
Qty: 1 KIT | Refills: 3 | Status: SHIPPED | OUTPATIENT
Start: 2024-08-21

## 2024-08-21 NOTE — PROGRESS NOTES
Subjective:       Patient ID: Lilly Loera is a 49 y.o. female.    Chief Complaint: Annual Exam (C/o still having issues with boils.  C/o intermittent abdominal pain where hernia was repaired, Dr. Londono was surgeon.  )    HPI      49 y.o. female with Active Diagnosis Review (HCC)     Chronic             HCC 48 - Morbid Obesity     Obesity, morbid [E66.01]     HCC 93 - Rheumatoid Arthritis and Other Specified Inflammatory Rheumatic   Disorders     Rheumatoid arthritis of multiple sites with negative rheumatoid factor   [M06.09]     RA (rheumatoid arthritis) [M06.9]      - Heart Failure, Except End-Stage and Acute     Congestive heart failure, unspecified HF chronicity, unspecified heart   failure type [I50.9]     CHF (congestive heart failure) [I50.9]      - Specified Heart Arrhythmias     Atrial fibrillation, unspecified type [I48.91]      - Chronic Kidney Disease, Moderate (Stage 3, Except 3B)     Stage 3a chronic kidney disease [N18.31]            here for healthcare maintenance and f/u chronic medical conditions.    On waiting list to get in with Rheumatologist in [place]. Having flare ups with pain. 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    Hidradenitis bothering her. Saw derm, recommend skin removal. Having flare ups, also not sure if related to walking more at work or if the heat.     Health Maintenance Topics with due status: Not Due       Topic Last Completion Date    Influenza Vaccine 04/06/2023    Hemoglobin A1c (Diabetic Prevention Screening) 07/14/2023    Lipid Panel 07/14/2023    Colorectal Cancer Screening 09/15/2023    Mammogram 04/23/2024       Health Maintenance Due   Topic Date Due    Annual UACr  Never done    COVID-19 Vaccine (1) Never done    Pneumococcal Vaccines (Age 0-64) (1 of 2 - PCV) Never done    HIV Screening  Never done    TETANUS VACCINE  10/28/2003       Health Maintenance Due   Topic Date Due    Annual UACr   "Never done    COVID-19 Vaccine (1) Never done    Pneumococcal Vaccines (Age 0-64) (1 of 2 - PCV) Never done    HIV Screening  Never done    TETANUS VACCINE  10/28/2003           Medical Problems:        Past Medical History:   Diagnosis Date    Anemia     Anxiety     Arthritis     CHF (congestive heart failure)     Depression     Hidradenitis suppurativa     Hypertension     Polyneuropathy      Past Surgical History:   Procedure Laterality Date    club foot correction      gall blader removed      GASTRIC BYPASS      gastric sleeve      HERNIA REPAIR      HYSTERECTOMY      partial    meniscus repair surgery      repaired ac1      under arm graphs       Family History   Problem Relation Name Age of Onset    Hypertension Mother      Arthritis Mother      Hypertension Father      Arthritis Father      Alcohol abuse Father      Heart disease Father       Social History     Socioeconomic History    Marital status:    Tobacco Use    Smoking status: Every Day     Current packs/day: 0.50     Types: Cigarettes     Passive exposure: Never    Smokeless tobacco: Never    Tobacco comments:     Pt enrolled in smoking cessation program   Substance and Sexual Activity    Alcohol use: Yes     Comment: "occasionally"    Drug use: Not Currently    Sexual activity: Not Currently     Social Determinants of Health     Food Insecurity: Food Insecurity Present (3/11/2024)    Hunger Vital Sign     Worried About Running Out of Food in the Last Year: Sometimes true     Ran Out of Food in the Last Year: Sometimes true   Transportation Needs: No Transportation Needs (3/11/2024)    PRAPARE - Transportation     Lack of Transportation (Medical): No     Lack of Transportation (Non-Medical): No   Physical Activity: Insufficiently Active (3/11/2024)    Exercise Vital Sign     Days of Exercise per Week: 3 days     Minutes of Exercise per Session: 30 min   Stress: Stress Concern Present (3/11/2024)    [employer] [place] [employer] - " Occupational Stress Questionnaire     Feeling of Stress : To some extent   Housing Stability: Low Risk  (3/11/2024)    Housing Stability Vital Sign     Unable to Pay for Housing in the Last Year: No     Number of Places Lived in the Last Year: 1     Unstable Housing in the Last Year: No     Review of patient's allergies indicates:   Allergen Reactions    Adhesive Blisters, Hives, Itching and Swelling    Adhesive tape-silicones      whelp and redness    Darvocet a500 [propoxyphene n-acetaminophen]     Demerol [meperidine]        Current Outpatient Medications:     amLODIPine (NORVASC) 5 MG tablet, Take 1 tablet (5 mg total) by mouth once daily., Disp: 90 tablet, Rfl: 0    budesonide-formoterol 80-4.5 mcg (SYMBICORT) 80-4.5 mcg/actuation HFAA, Inhale 2 puffs into the lungs. Controller, Disp: , Rfl:     buPROPion (WELLBUTRIN SR) 150 MG TBSR 12 hr tablet, TAKE 1 TABLET EVERY 12 HOURS, Disp: 180 tablet, Rfl: 0    busPIRone (BUSPAR) 15 MG tablet, Pt is to take 1/3 (5 mg) or 1/2 (7.5mgs) or one po HS PRN, Disp: 90 tablet, Rfl: 2    calcium citrate-vitamin D3 500 mg-12.5 mcg (500 unit) Chew, Take 1 tablet by mouth., Disp: , Rfl:     DULoxetine (CYMBALTA) 30 MG capsule, Take 30 mg by mouth 2 (two) times daily., Disp: , Rfl:     furosemide (LASIX) 20 MG tablet, Take 1 tablet (20 mg total) by mouth as needed (swelling, weight gain). as directed., Disp: 90 tablet, Rfl: 1    LIDOcaine (LIDODERM) 5 %, APPLY ONE PATCH TOPICALLY TO CLEAN, DRY SKIN. LEAVE ON FOR UP TO 12 HOURS THEN REMOVE. MUST WAIT AT LEAST 12 HOURS BEFORE APPLYING PATCH(ES) AGAIN., Disp: , Rfl:     multivitamin capsule, Take 1 capsule by mouth once daily., Disp: , Rfl:     mupirocin (BACTROBAN) 2 % ointment, 3 (three) times daily. Apply to affected area, Disp: , Rfl:     nystatin (MYCOSTATIN) powder, Apply topically 2 (two) times daily., Disp: 30 g, Rfl: 1    ondansetron (ZOFRAN-ODT) 4 MG TbDL, Take 4 mg by mouth every 12 (twelve) hours as needed., Disp: , Rfl:     " pantoprazole (PROTONIX) 40 MG tablet, Take 1 tablet (40 mg total) by mouth once daily., Disp: 90 tablet, Rfl: 0    sacubitriL-valsartan (ENTRESTO)  mg per tablet, TAKE 1 TABLET TWICE DAILY, Disp: 180 tablet, Rfl: 1    semaglutide (OZEMPIC) 0.25 mg or 0.5 mg (2 mg/3 mL) pen injector, INJECT 0.5MG UNDER THE SKIN ONE TIME WEEKLY, Disp: 9 each, Rfl: 3    cyanocobalamin, vitamin B-12, (B-12 COMPLIANCE) 1,000 mcg/mL Kit, Inject 1,000 mcg into the muscle every 28 days., Disp: 1 kit, Rfl: 3    gabapentin (NEURONTIN) 300 MG capsule, Take 1 capsule (300 mg total) by mouth 2 (two) times daily., Disp: 180 capsule, Rfl: 1    sulfaSALAzine (AZULFIDINE) 500 MG EC tablet, Take 1 tablet (500 mg total) by mouth 2 (two) times daily., Disp: 180 tablet, Rfl: 0        Review of Systems   Constitutional:  Positive for fatigue. Negative for fever.   HENT:  Negative for trouble swallowing.    Respiratory:  Negative for cough and wheezing.    Cardiovascular:  Negative for chest pain and leg swelling.   Gastrointestinal:  Positive for abdominal pain. Negative for blood in stool.   Genitourinary:  Negative for difficulty urinating.   Musculoskeletal:  Positive for arthralgias.   Skin:  Positive for wound. Negative for pallor.   Neurological:  Positive for numbness. Negative for syncope.          Objective:        Vitals:    08/21/24 0831   BP: 128/80   BP Location: Left arm   Patient Position: Sitting   BP Method: Large (Manual)   Pulse: 81   Resp: 15   Temp: 97.5 °F (36.4 °C)   TempSrc: Temporal   SpO2: 98%   Weight: 130.5 kg (287 lb 9.6 oz)   Height: 5' 7" (1.702 m)       Body mass index is 45.04 kg/m².    Physical Exam  Constitutional:       General: She is not in acute distress.     Appearance: She is well-developed. She is not diaphoretic.   HENT:      Head: Normocephalic and atraumatic.      Right Ear: External ear normal.      Left Ear: External ear normal.   Cardiovascular:      Rate and Rhythm: Normal rate and regular rhythm. "   Pulmonary:      Effort: Pulmonary effort is normal.      Breath sounds: Normal breath sounds.   Abdominal:      General: Bowel sounds are normal. There is no distension.      Palpations: Abdomen is soft.   Musculoskeletal:      Cervical back: Neck supple.   Skin:     General: Skin is warm and dry.      Nails: There is no clubbing.   Neurological:      Mental Status: She is alert. Mental status is at baseline.   Psychiatric:         Behavior: Behavior normal.         Judgment: Judgment normal.         Assessment:     1. Annual physical exam    2. Essential hypertension    3. Stage 3a chronic kidney disease    4. Obesity, morbid    5. Rheumatoid arthritis of multiple sites with negative rheumatoid factor    6. Atrial fibrillation, unspecified type    7. Congestive heart failure, unspecified HF chronicity, unspecified heart failure type    8. Drug-induced immunodeficiency    9. Mild recurrent major depression    10. S/P gastric bypass    11. Fatigue, unspecified type    12. Screening for cardiovascular condition    13. Other long term (current) drug therapy    14. Hidradenitis suppurativa    15. Normocytic anemia    16. Neuropathy           Plan:         1. Annual physical exam  - labs - fasting today  - immunizations reviewed, discussed  - pap/gyn s/p hyst  - mammo 4/24  - CRC screen 9/23  - CBC Auto Differential  - Comprehensive Metabolic Panel  - Hemoglobin A1C  - Lipid Panel  - TSH  - T4, Free  - Ferritin  - Iron and TIBC  - Vitamin B12  - Vitamin D  - she will f/u with general surgeon regarding concerns around hernia repair.    2. Essential hypertension  - controlled on current medications, continue. Monitored through digital HTN program.  - TSH  - T4, Free    3. Stage 3a chronic kidney disease  - chronic, stable. Monitor w/ labs. Oral hydration. Avoid nsaids.  - Comprehensive Metabolic Panel  - Microalbumin/Creatinine Ratio, Urine    4. Obesity, morbid  - encourage weight loss through healthy lifestyle  including diet and exercise  - s/p gastric sleeve then later converted to bypass    5. Rheumatoid arthritis of multiple sites with negative rheumatoid factor  - chronic. Intermittent flares. She is on waitlist for apt w/ new Rheumatologist in [place] as her rheum locally left practice abruptly  - sulfaSALAzine (AZULFIDINE) 500 MG EC tablet; Take 1 tablet (500 mg total) by mouth 2 (two) times daily.  Dispense: 180 tablet; Refill: 0    6. Atrial fibrillation, unspecified type  - chronic, paroxysmal. Follows w/ cardiology Dr. Bae- management per cards.    7. Congestive heart failure, unspecified HF chronicity, unspecified heart failure type  - chronic. Follows w/ cardiology Dr. Bae- management per cards. Participating in digital program through cardiology office. Daily weight check ins sent electronically.     8. Drug-induced immunodeficiency  - chronic, stable, asymptomatic.   - CBC Auto Differential    9. Mild recurrent major depression  - chronic, stable. Doing well w/ cymbalta, continue.  - DULoxetine (CYMBALTA) 30 MG capsule; Take 30 mg by mouth 2 (two) times daily.    10. S/P gastric bypass    - CBC Auto Differential  - Comprehensive Metabolic Panel  - Hemoglobin A1C  - Lipid Panel  - TSH  - T4, Free  - Ferritin  - Iron and TIBC  - Vitamin B12  - Vitamin D  - cyanocobalamin, vitamin B-12, (B-12 COMPLIANCE) 1,000 mcg/mL Kit; Inject 1,000 mcg into the muscle every 28 days.  Dispense: 1 kit; Refill: 3    11. Fatigue, unspecified type  - reports SL B12 ineffective at times to help w/ fatigue (during RA and hidradenitis flares) and oral absorption limited d/t h/o gastric bypass and current PPI use so will send rx for IM B12.  - CBC Auto Differential  - Comprehensive Metabolic Panel  - Hemoglobin A1C  - Lipid Panel  - TSH  - T4, Free  - Ferritin  - Iron and TIBC  - Vitamin B12  - Vitamin D  - cyanocobalamin, vitamin B-12, (B-12 COMPLIANCE) 1,000 mcg/mL Kit; Inject 1,000 mcg into the muscle every 28 days.   Dispense: 1 kit; Refill: 3    12. Screening for cardiovascular condition    - Lipid Panel    13. Other long term (current) drug therapy    - CBC Auto Differential  - Comprehensive Metabolic Panel  - Hemoglobin A1C  - Lipid Panel  - TSH  - T4, Free  - Ferritin  - Iron and TIBC  - Vitamin B12  - Vitamin D    14. Hidradenitis suppurativa  - pt will f/u with derm regarding possible new med management or other treatment recommendations    15. Normocytic anemia    - Ferritin  - Iron and TIBC    16. Neuropathy  - only taking gabapentin BID rather than TID as causes fatigue if mid-day dose. Rx updated and refilled.  - gabapentin (NEURONTIN) 300 MG capsule; Take 1 capsule (300 mg total) by mouth 2 (two) times daily.  Dispense: 180 capsule; Refill: 1              Unless there are intervening problems, Follow up in about 6 months (around 2/21/2025), or if symptoms worsen or fail to improve, for follow up.      Patient note was created using MModal Dictation.  Any errors in syntax or even information may not have been identified and edited on initial review prior to signing this note.    I spent a total of 41 minutes on the day of the visit.  This includes face to face time and non-face to face time preparing to see the patient (eg, review of tests), obtaining and/or reviewing separately obtained history, documenting clinical information in the electronic or other health record, independently interpreting results and communicating results to the patient/family/caregiver, or care coordinator.    Visit today included increased complexity associated with the care of the episodic problem hidradenitis, fatigue, afib addressed and managing the longitudinal care of the patient due to the serious and/or complex managed problem(s) HTN, CKD, MDD, CHF.

## 2024-08-21 NOTE — TELEPHONE ENCOUNTER
LMVMx1 for pt. To call back.    Was pt. Able to p/u B12?  Ins is denying PA.  Paperwork on my desk.

## 2024-08-27 NOTE — PROGRESS NOTES
Results released to patient portal.      Your results have been reviewed.  Kidney function fluctuating between normal to CKD3a range. Stay hydrated with water, avoid nsaids. We will monitor routinely with normal labs.  Stable blood count mildly anemic but iron panel and ferritin are good.  Vitamin D and B12 are also in good range.  No diabetes.  Thyroid labs normal.  Cholesterol is good.  Please call if you have any questions or concerns.    Sincerely,   Dr. Jones

## 2024-08-30 DIAGNOSIS — I50.9 CONGESTIVE HEART FAILURE, UNSPECIFIED HF CHRONICITY, UNSPECIFIED HEART FAILURE TYPE: ICD-10-CM

## 2024-08-30 DIAGNOSIS — F41.9 ANXIETY: ICD-10-CM

## 2024-08-30 DIAGNOSIS — F33.0 MILD RECURRENT MAJOR DEPRESSION: ICD-10-CM

## 2024-08-30 DIAGNOSIS — F17.210 CIGARETTE NICOTINE DEPENDENCE WITHOUT COMPLICATION: ICD-10-CM

## 2024-08-30 DIAGNOSIS — I10 ESSENTIAL HYPERTENSION: ICD-10-CM

## 2024-09-03 ENCOUNTER — PATIENT MESSAGE (OUTPATIENT)
Dept: FAMILY MEDICINE | Facility: CLINIC | Age: 49
End: 2024-09-03
Payer: MEDICARE

## 2024-09-03 RX ORDER — BUPROPION HYDROCHLORIDE 150 MG/1
150 TABLET, EXTENDED RELEASE ORAL EVERY 12 HOURS
Qty: 180 TABLET | Refills: 3 | Status: SHIPPED | OUTPATIENT
Start: 2024-09-03

## 2024-09-03 RX ORDER — SACUBITRIL AND VALSARTAN 97; 103 MG/1; MG/1
1 TABLET, FILM COATED ORAL 2 TIMES DAILY
Qty: 180 TABLET | Refills: 1 | Status: SHIPPED | OUTPATIENT
Start: 2024-09-03

## 2024-09-04 ENCOUNTER — PATIENT MESSAGE (OUTPATIENT)
Dept: FAMILY MEDICINE | Facility: CLINIC | Age: 49
End: 2024-09-04
Payer: MEDICARE

## 2024-09-19 ENCOUNTER — PATIENT MESSAGE (OUTPATIENT)
Dept: FAMILY MEDICINE | Facility: CLINIC | Age: 49
End: 2024-09-19
Payer: MEDICARE

## 2024-09-19 DIAGNOSIS — M06.09 RHEUMATOID ARTHRITIS OF MULTIPLE SITES WITH NEGATIVE RHEUMATOID FACTOR: Primary | ICD-10-CM

## 2024-09-19 DIAGNOSIS — E16.2 HYPOGLYCEMIA: ICD-10-CM

## 2024-09-19 NOTE — TELEPHONE ENCOUNTER
External referrals for rheumatology and endocrinology ordered/printed. Can be faxed to her preference

## 2024-09-21 DIAGNOSIS — I10 ESSENTIAL HYPERTENSION: ICD-10-CM

## 2024-09-24 RX ORDER — AMLODIPINE BESYLATE 5 MG/1
5 TABLET ORAL
Qty: 90 TABLET | Refills: 3 | Status: SHIPPED | OUTPATIENT
Start: 2024-09-24

## 2024-10-06 DIAGNOSIS — K44.9 HIATAL HERNIA WITH GERD AND ESOPHAGITIS: ICD-10-CM

## 2024-10-06 DIAGNOSIS — K21.00 HIATAL HERNIA WITH GERD AND ESOPHAGITIS: ICD-10-CM

## 2024-10-08 RX ORDER — PANTOPRAZOLE SODIUM 40 MG/1
40 TABLET, DELAYED RELEASE ORAL
Qty: 90 TABLET | Refills: 3 | Status: SHIPPED | OUTPATIENT
Start: 2024-10-08

## 2024-11-06 ENCOUNTER — CLINICAL SUPPORT (OUTPATIENT)
Dept: SMOKING CESSATION | Facility: CLINIC | Age: 49
End: 2024-11-06
Payer: COMMERCIAL

## 2024-11-06 DIAGNOSIS — F17.200 NICOTINE DEPENDENCE: Primary | ICD-10-CM

## 2024-11-06 PROCEDURE — 99407 BEHAV CHNG SMOKING > 10 MIN: CPT | Mod: S$GLB,,, | Performed by: GENERAL PRACTICE

## 2024-11-06 NOTE — PROGRESS NOTES
Spoke with patient today in regard to smoking cessation progress 6/12 month telephone follow up, she states that she is not tobacco free.  Patient states she is smoking under a half pack of cigarettes daily.  Informed patient of benefit period, future follow up, and contact information if any further help or support is needed.  Patient not ready to schedule appointment at this time.  Will complete smart form for 6/12 month follow up on Quit attempt #1 and resolve episode.

## 2024-12-18 DIAGNOSIS — M06.09 RHEUMATOID ARTHRITIS OF MULTIPLE SITES WITH NEGATIVE RHEUMATOID FACTOR: ICD-10-CM

## 2024-12-18 RX ORDER — SULFASALAZINE 500 MG/1
500 TABLET, DELAYED RELEASE ORAL 2 TIMES DAILY
Qty: 180 TABLET | Refills: 0 | Status: SHIPPED | OUTPATIENT
Start: 2024-12-18

## 2025-01-27 DIAGNOSIS — I10 ESSENTIAL HYPERTENSION: ICD-10-CM

## 2025-01-27 DIAGNOSIS — I50.9 CONGESTIVE HEART FAILURE, UNSPECIFIED HF CHRONICITY, UNSPECIFIED HEART FAILURE TYPE: ICD-10-CM

## 2025-01-28 RX ORDER — SACUBITRIL AND VALSARTAN 97; 103 MG/1; MG/1
1 TABLET, FILM COATED ORAL 2 TIMES DAILY
Qty: 180 TABLET | Refills: 0 | Status: SHIPPED | OUTPATIENT
Start: 2025-01-28

## 2025-02-14 ENCOUNTER — TELEPHONE (OUTPATIENT)
Dept: SURGERY | Facility: CLINIC | Age: 50
End: 2025-02-14
Payer: MEDICARE

## 2025-02-14 NOTE — TELEPHONE ENCOUNTER
----- Message from Grace sent at 2/14/2025 11:33 AM CST -----  Type:  Needs Medical Advice    Who Called: Lilly Loera    Symptoms (please be specific): -   How long has patient had these symptoms:  -  Pharmacy name and phone #:  -  Would the patient rather a call back or a response via [facility]?    Best Call Back Number: [phone]    Additional Information:  pt needs to speak w/ nurse about a medication please call

## 2025-02-17 ENCOUNTER — TELEPHONE (OUTPATIENT)
Dept: SURGERY | Facility: CLINIC | Age: 50
End: 2025-02-17
Payer: MEDICARE

## 2025-02-17 NOTE — TELEPHONE ENCOUNTER
----- Message from Juanito sent at 2/17/2025  1:45 PM CST -----  Contact: self  .Type:  Patient Returning CallWho Called:.Lilly Mkceon Left Message for Patient:Leo the patient know what this is regarding?:yesWould the patient rather a call back or a response via [facility]? Call [facility] Call Back Number:.[phone] Information: pt is returning a phone call

## 2025-03-11 ENCOUNTER — TELEPHONE (OUTPATIENT)
Facility: CLINIC | Age: 50
End: 2025-03-11
Payer: MEDICARE

## 2025-03-14 ENCOUNTER — OFFICE VISIT (OUTPATIENT)
Facility: CLINIC | Age: 50
End: 2025-03-14
Payer: MEDICARE

## 2025-03-14 VITALS
BODY MASS INDEX: 45.15 KG/M2 | TEMPERATURE: 98 F | HEART RATE: 102 BPM | SYSTOLIC BLOOD PRESSURE: 131 MMHG | OXYGEN SATURATION: 98 % | WEIGHT: 287.69 LBS | RESPIRATION RATE: 16 BRPM | DIASTOLIC BLOOD PRESSURE: 90 MMHG | HEIGHT: 67 IN

## 2025-03-14 DIAGNOSIS — I10 ESSENTIAL HYPERTENSION: ICD-10-CM

## 2025-03-14 DIAGNOSIS — F33.1 MODERATE RECURRENT MAJOR DEPRESSION: ICD-10-CM

## 2025-03-14 DIAGNOSIS — M25.50 PAIN IN JOINT INVOLVING MULTIPLE SITES: ICD-10-CM

## 2025-03-14 DIAGNOSIS — R45.89 ANXIETY ABOUT HEALTH: ICD-10-CM

## 2025-03-14 DIAGNOSIS — M19.90 OSTEOARTHRITIS, UNSPECIFIED OSTEOARTHRITIS TYPE, UNSPECIFIED SITE: ICD-10-CM

## 2025-03-14 DIAGNOSIS — Z76.0 ENCOUNTER FOR MEDICATION REFILL: Primary | ICD-10-CM

## 2025-03-14 DIAGNOSIS — F41.9 ANXIETY: ICD-10-CM

## 2025-03-14 DIAGNOSIS — F33.0 MILD RECURRENT MAJOR DEPRESSION: ICD-10-CM

## 2025-03-14 DIAGNOSIS — M06.09 RHEUMATOID ARTHRITIS OF MULTIPLE SITES WITH NEGATIVE RHEUMATOID FACTOR: ICD-10-CM

## 2025-03-14 DIAGNOSIS — E66.01 OBESITY, MORBID: ICD-10-CM

## 2025-03-14 RX ORDER — BUSPIRONE HYDROCHLORIDE 15 MG/1
TABLET ORAL
Qty: 90 TABLET | Refills: 3 | Status: SHIPPED | OUTPATIENT
Start: 2025-03-14

## 2025-03-14 RX ORDER — LIDOCAINE 50 MG/G
PATCH TOPICAL
Qty: 30 PATCH | Refills: 2 | Status: SHIPPED | OUTPATIENT
Start: 2025-03-14

## 2025-03-14 RX ORDER — DULOXETIN HYDROCHLORIDE 30 MG/1
30 CAPSULE, DELAYED RELEASE ORAL NIGHTLY
Qty: 90 CAPSULE | Refills: 3 | Status: SHIPPED | OUTPATIENT
Start: 2025-03-14

## 2025-03-14 NOTE — PROGRESS NOTES
"Subjective     Patient ID: Lilly Loera is a 49 y.o. female.    Chief Complaint: Hypertension, Follow-up, and Medication Refill    Presents to clinic for 6 mo HTN f/u and medication refills: Buspar,Cymbalta, and Lidoderm patches.    Doing well on current HTN medication regimen. She remains compliant with the prescribed antihypertensive regimen and is an active participant in the the digital medicine program which is evident by review of her well controlled BP readings. No adverse effects or intolerance to Amlodipine.    Buspar continues to be well tolerated and is adequately controlling her anxiety symptoms. Reports recently increased anxiety related to her multiple chronic health conditions. States,"I just have a lot going on right now. I just left the orthopedist's office and found out I have to have knee surgery and I'm in so much pain." Requests refill of lidocaine patches.            Hypertension  Pertinent negatives include no shortness of breath.   Follow-up  Associated symptoms include arthralgias, joint swelling and myalgias. Pertinent negatives include no coughing, fever, numbness or weakness.   Medication Refill  Associated symptoms include arthralgias, joint swelling and myalgias. Pertinent negatives include no coughing, fever, numbness or weakness.   Review of Systems   Constitutional:  Negative for appetite change, fever and unexpected weight change.   Respiratory:  Negative for cough and shortness of breath.    Gastrointestinal:  Negative for diarrhea.   Musculoskeletal:  Positive for arthralgias, gait problem, joint swelling and myalgias.   Integumentary:         Chronic hidradenitis suppurativa.   Neurological:  Negative for weakness and numbness.   Psychiatric/Behavioral:  Positive for depressed mood. Negative for agitation, behavioral problems, confusion, decreased concentration, dysphoric mood, hallucinations, self-injury, sleep disturbance and suicidal ideas. The patient is nervous/anxious. " The patient is not hyperactive.           Objective     Physical Exam  Vitals and nursing note reviewed.   Constitutional:       Appearance: Normal appearance. She is obese.   HENT:      Head: Normocephalic and atraumatic.      Mouth/Throat:      Mouth: Mucous membranes are moist.   Cardiovascular:      Rate and Rhythm: Regular rhythm. Tachycardia present.   Pulmonary:      Effort: Pulmonary effort is normal.   Skin:     General: Skin is warm and dry.   Neurological:      Mental Status: She is alert and oriented to person, place, and time.   Psychiatric:         Attention and Perception: Attention and perception normal.         Mood and Affect: Affect normal. Mood is anxious and depressed.         Speech: Speech normal.         Behavior: Behavior normal. Behavior is cooperative.         Thought Content: Thought content normal.         Cognition and Memory: Cognition and memory normal.         Judgment: Judgment normal.      Comments: Appears mildly anxious, however mood and affect are appropriate situationally. Becomes tearful while talking about news of need for knee replacement but smiles and maintains good eye contact.  Denies SI/HI.            Assessment and Plan     1. Encounter for medication refill  -     busPIRone (BUSPAR) 15 MG tablet; Pt is to take 1/3 (5 mg) or 1/2 (7.5mgs) or one po HS PRN  Dispense: 90 tablet; Refill: 3  -     LIDOcaine (LIDODERM) 5 %; Remove & Discard patch within 12 hours or as directed by [facility] ONE PATCH TOPICALLY TO CLEAN, DRY SKIN. LEAVE ON FOR UP TO 12 HOURS THEN REMOVE. MUST WAIT AT LEAST 12 HOURS BEFORE APPLYING PATCH(ES) AGAIN.  Dispense: 30 patch; Refill: 2  -     DULoxetine (CYMBALTA) 30 MG capsule; Take 1 capsule (30 mg total) by mouth every evening.  Dispense: 90 capsule; Refill: 3    2. Anxiety about health  -     busPIRone (BUSPAR) 15 MG tablet; Pt is to take 1/3 (5 mg) or 1/2 (7.5mgs) or one po HS PRN  Dispense: 90 tablet; Refill: 3  -     DULoxetine (CYMBALTA) 30 MG  capsule; Take 1 capsule (30 mg total) by mouth every evening.  Dispense: 90 capsule; Refill: 3    3. Moderate recurrent major depression  -     busPIRone (BUSPAR) 15 MG tablet; Pt is to take 1/3 (5 mg) or 1/2 (7.5mgs) or one po HS PRN  Dispense: 90 tablet; Refill: 3  -     DULoxetine (CYMBALTA) 30 MG capsule; Take 1 capsule (30 mg total) by mouth every evening.  Dispense: 90 capsule; Refill: 3    4. Osteoarthritis, unspecified osteoarthritis type, unspecified site  -     LIDOcaine (LIDODERM) 5 %; Remove & Discard patch within 12 hours or as directed by [facility] ONE PATCH TOPICALLY TO CLEAN, DRY SKIN. LEAVE ON FOR UP TO 12 HOURS THEN REMOVE. MUST WAIT AT LEAST 12 HOURS BEFORE APPLYING PATCH(ES) AGAIN.  Dispense: 30 patch; Refill: 2    5. Rheumatoid arthritis of multiple sites with negative rheumatoid factor  -     LIDOcaine (LIDODERM) 5 %; Remove & Discard patch within 12 hours or as directed by [facility] ONE PATCH TOPICALLY TO CLEAN, DRY SKIN. LEAVE ON FOR UP TO 12 HOURS THEN REMOVE. MUST WAIT AT LEAST 12 HOURS BEFORE APPLYING PATCH(ES) AGAIN.  Dispense: 30 patch; Refill: 2    6. Mild recurrent major depression  -     busPIRone (BUSPAR) 15 MG tablet; Pt is to take 1/3 (5 mg) or 1/2 (7.5mgs) or one po HS PRN  Dispense: 90 tablet; Refill: 3  -     DULoxetine (CYMBALTA) 30 MG capsule; Take 1 capsule (30 mg total) by mouth every evening.  Dispense: 90 capsule; Refill: 3    7. Anxiety  -     busPIRone (BUSPAR) 15 MG tablet; Pt is to take 1/3 (5 mg) or 1/2 (7.5mgs) or one po HS PRN  Dispense: 90 tablet; Refill: 3  -     DULoxetine (CYMBALTA) 30 MG capsule; Take 1 capsule (30 mg total) by mouth every evening.  Dispense: 90 capsule; Refill: 3    8. Essential hypertension  Overview:  Last Assessment & Plan:   Formatting of this note might be different from the original.  The current medical regimen is effective;  continue present plan and medications.      9. Obesity, morbid    10. Pain in joint involving multiple sites  -      "LIDOcaine (LIDODERM) 5 %; Remove & Discard patch within 12 hours or as directed by [facility] ONE PATCH TOPICALLY TO CLEAN, DRY SKIN. LEAVE ON FOR UP TO 12 HOURS THEN REMOVE. MUST WAIT AT LEAST 12 HOURS BEFORE APPLYING PATCH(ES) AGAIN.  Dispense: 30 patch; Refill: 2    PHQ-9 score of 19  JP-7 score of 17  Is only taking Cymbalta 30 mg QHS as opposed to the prescribed BID due to daytime somnolence.  Offered to change dose to 60 mg QHS, however she recalls intolerable nausea associated with 60 mg dose. She prefers to remain on current regimen as she states, "I really dont's feel as bad as it looks there on paper". Advised her to contact office to schedule appointment if she notices worsening depression.    Advised her to make obesity management follow up with Dr Jones to discuss alternative options to Ozempic. Would like to try Tirzepatide since was unable to continue taking Ozempic due to intolerable nausea.  Continue digital medicine program. Recent changes to Amlodipine regimen have been updated in chart.      Follow up today (on 3/14/2025), or f/u 2-4 weeks virtual for obesity med mgmt- wants to discuss changing to alternative med- Zepbound, for Virtual Visit.    "

## 2025-03-17 DIAGNOSIS — G62.9 NEUROPATHY: ICD-10-CM

## 2025-03-17 DIAGNOSIS — M06.09 RHEUMATOID ARTHRITIS OF MULTIPLE SITES WITH NEGATIVE RHEUMATOID FACTOR: ICD-10-CM

## 2025-03-19 DIAGNOSIS — M06.09 RHEUMATOID ARTHRITIS OF MULTIPLE SITES WITH NEGATIVE RHEUMATOID FACTOR: ICD-10-CM

## 2025-03-19 DIAGNOSIS — G62.9 NEUROPATHY: ICD-10-CM

## 2025-03-19 RX ORDER — SULFASALAZINE 500 MG/1
500 TABLET, DELAYED RELEASE ORAL 2 TIMES DAILY
Qty: 180 TABLET | Refills: 0 | Status: SHIPPED | OUTPATIENT
Start: 2025-03-19

## 2025-03-19 RX ORDER — GABAPENTIN 300 MG/1
CAPSULE ORAL
Refills: 0 | OUTPATIENT
Start: 2025-03-19

## 2025-03-19 RX ORDER — GABAPENTIN 300 MG/1
300 CAPSULE ORAL 2 TIMES DAILY
Qty: 180 CAPSULE | Refills: 1 | Status: SHIPPED | OUTPATIENT
Start: 2025-03-19

## 2025-03-19 RX ORDER — SULFASALAZINE 500 MG/1
TABLET, DELAYED RELEASE ORAL
Refills: 0 | OUTPATIENT
Start: 2025-03-19

## 2025-03-19 NOTE — TELEPHONE ENCOUNTER
Renewed separate encounter since [facility] pharmacy removes rx sig/instructions

## 2025-03-28 ENCOUNTER — PATIENT MESSAGE (OUTPATIENT)
Facility: CLINIC | Age: 50
End: 2025-03-28
Payer: MEDICARE